# Patient Record
Sex: FEMALE | Race: WHITE | NOT HISPANIC OR LATINO | Employment: PART TIME | URBAN - METROPOLITAN AREA
[De-identification: names, ages, dates, MRNs, and addresses within clinical notes are randomized per-mention and may not be internally consistent; named-entity substitution may affect disease eponyms.]

---

## 2023-04-02 DIAGNOSIS — E78.5 HYPERLIPIDEMIA, UNSPECIFIED: ICD-10-CM

## 2023-04-02 RX ORDER — OMEGA-3-ACID ETHYL ESTERS 1 G/1
CAPSULE, LIQUID FILLED ORAL
Qty: 60 CAPSULE | Refills: 5 | Status: SHIPPED | OUTPATIENT
Start: 2023-04-02 | End: 2023-12-06

## 2023-06-26 DIAGNOSIS — I10 PRIMARY HYPERTENSION: Primary | ICD-10-CM

## 2023-06-27 RX ORDER — VALSARTAN 80 MG/1
TABLET ORAL
Qty: 90 TABLET | Refills: 3 | Status: SHIPPED | OUTPATIENT
Start: 2023-06-27 | End: 2023-10-03 | Stop reason: HOSPADM

## 2023-09-04 DIAGNOSIS — F41.9 ANXIETY: ICD-10-CM

## 2023-09-04 DIAGNOSIS — I10 PRIMARY HYPERTENSION: Primary | ICD-10-CM

## 2023-09-05 RX ORDER — BISOPROLOL FUMARATE AND HYDROCHLOROTHIAZIDE 10; 6.25 MG/1; MG/1
1 TABLET ORAL DAILY
Qty: 90 TABLET | Refills: 0 | Status: SHIPPED | OUTPATIENT
Start: 2023-09-05 | End: 2023-10-03 | Stop reason: HOSPADM

## 2023-09-05 RX ORDER — FLUOXETINE HYDROCHLORIDE 20 MG/1
20 CAPSULE ORAL DAILY
Qty: 90 CAPSULE | Refills: 0 | Status: SHIPPED | OUTPATIENT
Start: 2023-09-05 | End: 2023-11-08

## 2023-09-20 DIAGNOSIS — E78.2 MIXED HYPERLIPIDEMIA: Primary | ICD-10-CM

## 2023-09-20 RX ORDER — FENOFIBRATE 160 MG/1
160 TABLET ORAL EVERY OTHER DAY
Qty: 45 TABLET | Refills: 0 | Status: SHIPPED | OUTPATIENT
Start: 2023-09-20 | End: 2023-10-03 | Stop reason: HOSPADM

## 2023-09-30 PROCEDURE — 99292 CRITICAL CARE ADDL 30 MIN: CPT | Mod: CS | Performed by: EMERGENCY MEDICINE

## 2023-09-30 PROCEDURE — 99291 CRITICAL CARE FIRST HOUR: CPT | Mod: CS | Performed by: EMERGENCY MEDICINE

## 2023-09-30 ASSESSMENT — COLUMBIA-SUICIDE SEVERITY RATING SCALE - C-SSRS
6. HAVE YOU EVER DONE ANYTHING, STARTED TO DO ANYTHING, OR PREPARED TO DO ANYTHING TO END YOUR LIFE?: NO
1. IN THE PAST MONTH, HAVE YOU WISHED YOU WERE DEAD OR WISHED YOU COULD GO TO SLEEP AND NOT WAKE UP?: NO
2. HAVE YOU ACTUALLY HAD ANY THOUGHTS OF KILLING YOURSELF?: NO

## 2023-10-01 ENCOUNTER — PREP FOR PROCEDURE (OUTPATIENT)
Dept: CARDIOLOGY | Facility: HOSPITAL | Age: 63
End: 2023-10-01

## 2023-10-01 ENCOUNTER — HOSPITAL ENCOUNTER (INPATIENT)
Facility: HOSPITAL | Age: 63
LOS: 2 days | Discharge: HOME | DRG: 321 | End: 2023-10-03
Attending: EMERGENCY MEDICINE | Admitting: INTERNAL MEDICINE
Payer: COMMERCIAL

## 2023-10-01 ENCOUNTER — APPOINTMENT (OUTPATIENT)
Dept: RADIOLOGY | Facility: HOSPITAL | Age: 63
DRG: 321 | End: 2023-10-01
Payer: COMMERCIAL

## 2023-10-01 ENCOUNTER — APPOINTMENT (OUTPATIENT)
Dept: CARDIOLOGY | Facility: HOSPITAL | Age: 63
DRG: 321 | End: 2023-10-01
Payer: COMMERCIAL

## 2023-10-01 DIAGNOSIS — I21.3 STEMI (ST ELEVATION MYOCARDIAL INFARCTION) (MULTI): ICD-10-CM

## 2023-10-01 DIAGNOSIS — I21.3 ST ELEVATION MYOCARDIAL INFARCTION (STEMI), UNSPECIFIED ARTERY (MULTI): Primary | ICD-10-CM

## 2023-10-01 DIAGNOSIS — E78.2 MODERATE MIXED HYPERLIPIDEMIA NOT REQUIRING STATIN THERAPY: ICD-10-CM

## 2023-10-01 DIAGNOSIS — I10 PRIMARY HYPERTENSION: ICD-10-CM

## 2023-10-01 DIAGNOSIS — I21.21 ST ELEVATION (STEMI) MYOCARDIAL INFARCTION INVOLVING LEFT CIRCUMFLEX CORONARY ARTERY (MULTI): ICD-10-CM

## 2023-10-01 PROBLEM — E04.1 THYROID NODULE: Status: ACTIVE | Noted: 2021-08-30

## 2023-10-01 PROBLEM — E78.5 HYPERLIPIDEMIA: Status: ACTIVE | Noted: 2018-08-07

## 2023-10-01 PROBLEM — K21.9 GASTROESOPHAGEAL REFLUX DISEASE: Status: ACTIVE | Noted: 2021-08-30

## 2023-10-01 PROBLEM — F32.A DEPRESSIVE DISORDER: Status: ACTIVE | Noted: 2021-08-30

## 2023-10-01 PROBLEM — K76.0 NAFLD (NONALCOHOLIC FATTY LIVER DISEASE): Status: ACTIVE | Noted: 2018-09-12

## 2023-10-01 PROBLEM — F17.200 NICOTINE DEPENDENCE: Status: ACTIVE | Noted: 2021-08-30

## 2023-10-01 LAB
ALBUMIN SERPL BCP-MCNC: 3.4 G/DL (ref 3.4–5)
ALBUMIN SERPL BCP-MCNC: 4.3 G/DL (ref 3.4–5)
ALP SERPL-CCNC: 50 U/L (ref 33–136)
ALT SERPL W P-5'-P-CCNC: 22 U/L (ref 7–45)
ANION GAP SERPL CALC-SCNC: 10 MMOL/L (ref 10–20)
ANION GAP SERPL CALC-SCNC: 15 MMOL/L (ref 10–20)
APTT PPP: 51 SECONDS (ref 27–38)
AST SERPL W P-5'-P-CCNC: 28 U/L (ref 9–39)
ATRIAL RATE: 68 BPM
BASOPHILS # BLD AUTO: 0.08 X10*3/UL (ref 0–0.1)
BASOPHILS NFR BLD AUTO: 0.8 %
BILIRUB SERPL-MCNC: 0.3 MG/DL (ref 0–1.2)
BUN SERPL-MCNC: 17 MG/DL (ref 6–23)
BUN SERPL-MCNC: 20 MG/DL (ref 6–23)
CALCIUM SERPL-MCNC: 8.6 MG/DL (ref 8.6–10.3)
CALCIUM SERPL-MCNC: 9.8 MG/DL (ref 8.6–10.3)
CARDIAC TROPONIN I PNL SERPL HS: 1105 NG/L (ref 0–13)
CARDIAC TROPONIN I PNL SERPL HS: 1609 NG/L (ref 0–13)
CARDIAC TROPONIN I PNL SERPL HS: 210 NG/L (ref 0–13)
CARDIAC TROPONIN I PNL SERPL HS: 454 NG/L (ref 0–13)
CHLORIDE SERPL-SCNC: 102 MMOL/L (ref 98–107)
CHLORIDE SERPL-SCNC: 106 MMOL/L (ref 98–107)
CHOLEST SERPL-MCNC: 158 MG/DL (ref 0–199)
CHOLESTEROL/HDL RATIO: 5.5
CO2 SERPL-SCNC: 25 MMOL/L (ref 21–32)
CO2 SERPL-SCNC: 27 MMOL/L (ref 21–32)
CREAT SERPL-MCNC: 0.69 MG/DL (ref 0.5–1.05)
CREAT SERPL-MCNC: 0.87 MG/DL (ref 0.5–1.05)
D DIMER PPP FEU-MCNC: 690 NG/ML FEU
EJECTION FRACTION APICAL 4 CHAMBER: 72.4
EOSINOPHIL # BLD AUTO: 0.38 X10*3/UL (ref 0–0.7)
EOSINOPHIL NFR BLD AUTO: 3.9 %
ERYTHROCYTE [DISTWIDTH] IN BLOOD BY AUTOMATED COUNT: 16.1 % (ref 11.5–14.5)
ERYTHROCYTE [DISTWIDTH] IN BLOOD BY AUTOMATED COUNT: 16.2 % (ref 11.5–14.5)
EST. AVERAGE GLUCOSE BLD GHB EST-MCNC: 131 MG/DL
GFR SERPL CREATININE-BSD FRML MDRD: 74 ML/MIN/1.73M*2
GFR SERPL CREATININE-BSD FRML MDRD: >90 ML/MIN/1.73M*2
GLUCOSE SERPL-MCNC: 123 MG/DL (ref 74–99)
GLUCOSE SERPL-MCNC: 92 MG/DL (ref 74–99)
HBA1C MFR BLD: 6.2 %
HCT VFR BLD AUTO: 30.4 % (ref 36–46)
HCT VFR BLD AUTO: 38 % (ref 36–46)
HDLC SERPL-MCNC: 28.7 MG/DL
HGB BLD-MCNC: 11.4 G/DL (ref 12–16)
HGB BLD-MCNC: 8.9 G/DL (ref 12–16)
IMM GRANULOCYTES # BLD AUTO: 0.07 X10*3/UL (ref 0–0.7)
IMM GRANULOCYTES NFR BLD AUTO: 0.7 % (ref 0–0.9)
INR PPP: 1.1 (ref 0.9–1.1)
LDLC SERPL CALC-MCNC: 92 MG/DL (ref 140–190)
LYMPHOCYTES # BLD AUTO: 2.03 X10*3/UL (ref 1.2–4.8)
LYMPHOCYTES NFR BLD AUTO: 20.6 %
MAGNESIUM SERPL-MCNC: 1.7 MG/DL (ref 1.6–2.4)
MCH RBC QN AUTO: 22.5 PG (ref 26–34)
MCH RBC QN AUTO: 22.9 PG (ref 26–34)
MCHC RBC AUTO-ENTMCNC: 29.3 G/DL (ref 32–36)
MCHC RBC AUTO-ENTMCNC: 30 G/DL (ref 32–36)
MCV RBC AUTO: 76 FL (ref 80–100)
MCV RBC AUTO: 77 FL (ref 80–100)
MONOCYTES # BLD AUTO: 0.68 X10*3/UL (ref 0.1–1)
MONOCYTES NFR BLD AUTO: 6.9 %
NEUTROPHILS # BLD AUTO: 6.63 X10*3/UL (ref 1.2–7.7)
NEUTROPHILS NFR BLD AUTO: 67.1 %
NON HDL CHOLESTEROL: 129 MG/DL (ref 0–149)
NRBC BLD-RTO: 0 /100 WBCS (ref 0–0)
NRBC BLD-RTO: 0 /100 WBCS (ref 0–0)
P AXIS: 51 DEGREES
P OFFSET: 195 MS
P ONSET: 140 MS
PHOSPHATE SERPL-MCNC: 4.4 MG/DL (ref 2.5–4.9)
PLATELET # BLD AUTO: 226 X10*3/UL (ref 150–450)
PLATELET # BLD AUTO: 331 X10*3/UL (ref 150–450)
PMV BLD AUTO: 9.8 FL (ref 7.5–11.5)
PMV BLD AUTO: 9.9 FL (ref 7.5–11.5)
POTASSIUM SERPL-SCNC: 3.3 MMOL/L (ref 3.5–5.3)
POTASSIUM SERPL-SCNC: 3.5 MMOL/L (ref 3.5–5.3)
PR INTERVAL: 144 MS
PROT SERPL-MCNC: 8.3 G/DL (ref 6.4–8.2)
PROTHROMBIN TIME: 12.5 SECONDS (ref 9.8–12.8)
Q ONSET: 212 MS
QRS COUNT: 11 BEATS
QRS DURATION: 88 MS
QT INTERVAL: 440 MS
QTC CALCULATION(BAZETT): 467 MS
QTC FREDERICIA: 459 MS
R AXIS: -41 DEGREES
RBC # BLD AUTO: 3.95 X10*6/UL (ref 4–5.2)
RBC # BLD AUTO: 4.98 X10*6/UL (ref 4–5.2)
SARS-COV-2 RNA RESP QL NAA+PROBE: NOT DETECTED
SODIUM SERPL-SCNC: 139 MMOL/L (ref 136–145)
SODIUM SERPL-SCNC: 139 MMOL/L (ref 136–145)
T AXIS: 72 DEGREES
T OFFSET: 432 MS
TRIGL SERPL-MCNC: 189 MG/DL (ref 0–149)
VENTRICULAR RATE: 68 BPM
VLDL: 38 MG/DL (ref 0–40)
WBC # BLD AUTO: 8.8 X10*3/UL (ref 4.4–11.3)
WBC # BLD AUTO: 9.9 X10*3/UL (ref 4.4–11.3)

## 2023-10-01 PROCEDURE — 93005 ELECTROCARDIOGRAM TRACING: CPT

## 2023-10-01 PROCEDURE — 2500000002 HC RX 250 W HCPCS SELF ADMINISTERED DRUGS (ALT 637 FOR MEDICARE OP, ALT 636 FOR OP/ED): Performed by: NURSE PRACTITIONER

## 2023-10-01 PROCEDURE — 2780000003 HC OR 278 NO HCPCS: Performed by: STUDENT IN AN ORGANIZED HEALTH CARE EDUCATION/TRAINING PROGRAM

## 2023-10-01 PROCEDURE — 80053 COMPREHEN METABOLIC PANEL: CPT | Performed by: EMERGENCY MEDICINE

## 2023-10-01 PROCEDURE — 80069 RENAL FUNCTION PANEL: CPT | Performed by: STUDENT IN AN ORGANIZED HEALTH CARE EDUCATION/TRAINING PROGRAM

## 2023-10-01 PROCEDURE — 2500000001 HC RX 250 WO HCPCS SELF ADMINISTERED DRUGS (ALT 637 FOR MEDICARE OP)

## 2023-10-01 PROCEDURE — 83735 ASSAY OF MAGNESIUM: CPT | Performed by: EMERGENCY MEDICINE

## 2023-10-01 PROCEDURE — 99223 1ST HOSP IP/OBS HIGH 75: CPT | Performed by: STUDENT IN AN ORGANIZED HEALTH CARE EDUCATION/TRAINING PROGRAM

## 2023-10-01 PROCEDURE — 2500000004 HC RX 250 GENERAL PHARMACY W/ HCPCS (ALT 636 FOR OP/ED)

## 2023-10-01 PROCEDURE — C9606 PERC D-E COR REVASC W AMI S: HCPCS | Mod: TC | Performed by: STUDENT IN AN ORGANIZED HEALTH CARE EDUCATION/TRAINING PROGRAM

## 2023-10-01 PROCEDURE — 2500000001 HC RX 250 WO HCPCS SELF ADMINISTERED DRUGS (ALT 637 FOR MEDICARE OP): Performed by: INTERNAL MEDICINE

## 2023-10-01 PROCEDURE — B2111ZZ FLUOROSCOPY OF MULTIPLE CORONARY ARTERIES USING LOW OSMOLAR CONTRAST: ICD-10-PCS | Performed by: STUDENT IN AN ORGANIZED HEALTH CARE EDUCATION/TRAINING PROGRAM

## 2023-10-01 PROCEDURE — 2500000005 HC RX 250 GENERAL PHARMACY W/O HCPCS

## 2023-10-01 PROCEDURE — C1725 CATH, TRANSLUMIN NON-LASER: HCPCS | Performed by: STUDENT IN AN ORGANIZED HEALTH CARE EDUCATION/TRAINING PROGRAM

## 2023-10-01 PROCEDURE — C1753 CATH, INTRAVAS ULTRASOUND: HCPCS | Performed by: STUDENT IN AN ORGANIZED HEALTH CARE EDUCATION/TRAINING PROGRAM

## 2023-10-01 PROCEDURE — 85025 COMPLETE CBC W/AUTO DIFF WBC: CPT | Performed by: EMERGENCY MEDICINE

## 2023-10-01 PROCEDURE — 2500000001 HC RX 250 WO HCPCS SELF ADMINISTERED DRUGS (ALT 637 FOR MEDICARE OP): Performed by: EMERGENCY MEDICINE

## 2023-10-01 PROCEDURE — C1887 CATHETER, GUIDING: HCPCS | Performed by: STUDENT IN AN ORGANIZED HEALTH CARE EDUCATION/TRAINING PROGRAM

## 2023-10-01 PROCEDURE — 2500000005 HC RX 250 GENERAL PHARMACY W/O HCPCS: Performed by: EMERGENCY MEDICINE

## 2023-10-01 PROCEDURE — 4A023N7 MEASUREMENT OF CARDIAC SAMPLING AND PRESSURE, LEFT HEART, PERCUTANEOUS APPROACH: ICD-10-PCS | Performed by: STUDENT IN AN ORGANIZED HEALTH CARE EDUCATION/TRAINING PROGRAM

## 2023-10-01 PROCEDURE — 92978 ENDOLUMINL IVUS OCT C 1ST: CPT | Mod: 59

## 2023-10-01 PROCEDURE — 99233 SBSQ HOSP IP/OBS HIGH 50: CPT | Performed by: INTERNAL MEDICINE

## 2023-10-01 PROCEDURE — 2020000001 HC ICU ROOM DAILY

## 2023-10-01 PROCEDURE — 99152 MOD SED SAME PHYS/QHP 5/>YRS: CPT | Performed by: STUDENT IN AN ORGANIZED HEALTH CARE EDUCATION/TRAINING PROGRAM

## 2023-10-01 PROCEDURE — 84484 ASSAY OF TROPONIN QUANT: CPT | Performed by: EMERGENCY MEDICINE

## 2023-10-01 PROCEDURE — 87635 SARS-COV-2 COVID-19 AMP PRB: CPT

## 2023-10-01 PROCEDURE — 99153 MOD SED SAME PHYS/QHP EA: CPT | Performed by: STUDENT IN AN ORGANIZED HEALTH CARE EDUCATION/TRAINING PROGRAM

## 2023-10-01 PROCEDURE — 85379 FIBRIN DEGRADATION QUANT: CPT | Performed by: EMERGENCY MEDICINE

## 2023-10-01 PROCEDURE — 2720000007 HC OR 272 NO HCPCS: Performed by: STUDENT IN AN ORGANIZED HEALTH CARE EDUCATION/TRAINING PROGRAM

## 2023-10-01 PROCEDURE — 85027 COMPLETE CBC AUTOMATED: CPT | Performed by: STUDENT IN AN ORGANIZED HEALTH CARE EDUCATION/TRAINING PROGRAM

## 2023-10-01 PROCEDURE — 2500000001 HC RX 250 WO HCPCS SELF ADMINISTERED DRUGS (ALT 637 FOR MEDICARE OP): Performed by: STUDENT IN AN ORGANIZED HEALTH CARE EDUCATION/TRAINING PROGRAM

## 2023-10-01 PROCEDURE — 027135Z DILATION OF CORONARY ARTERY, TWO ARTERIES WITH TWO DRUG-ELUTING INTRALUMINAL DEVICES, PERCUTANEOUS APPROACH: ICD-10-PCS | Performed by: STUDENT IN AN ORGANIZED HEALTH CARE EDUCATION/TRAINING PROGRAM

## 2023-10-01 PROCEDURE — 92929: CPT

## 2023-10-01 PROCEDURE — C1874 STENT, COATED/COV W/DEL SYS: HCPCS | Performed by: STUDENT IN AN ORGANIZED HEALTH CARE EDUCATION/TRAINING PROGRAM

## 2023-10-01 PROCEDURE — 2500000004 HC RX 250 GENERAL PHARMACY W/ HCPCS (ALT 636 FOR OP/ED): Performed by: STUDENT IN AN ORGANIZED HEALTH CARE EDUCATION/TRAINING PROGRAM

## 2023-10-01 PROCEDURE — 93454 CORONARY ARTERY ANGIO S&I: CPT

## 2023-10-01 PROCEDURE — 36415 COLL VENOUS BLD VENIPUNCTURE: CPT | Performed by: EMERGENCY MEDICINE

## 2023-10-01 PROCEDURE — 92978 ENDOLUMINL IVUS OCT C 1ST: CPT | Performed by: STUDENT IN AN ORGANIZED HEALTH CARE EDUCATION/TRAINING PROGRAM

## 2023-10-01 PROCEDURE — S4991 NICOTINE PATCH NONLEGEND: HCPCS | Performed by: NURSE PRACTITIONER

## 2023-10-01 PROCEDURE — 93454 CORONARY ARTERY ANGIO S&I: CPT | Performed by: STUDENT IN AN ORGANIZED HEALTH CARE EDUCATION/TRAINING PROGRAM

## 2023-10-01 PROCEDURE — 2550000001 HC RX 255 CONTRASTS: Performed by: STUDENT IN AN ORGANIZED HEALTH CARE EDUCATION/TRAINING PROGRAM

## 2023-10-01 PROCEDURE — 93010 ELECTROCARDIOGRAM REPORT: CPT | Performed by: INTERNAL MEDICINE

## 2023-10-01 PROCEDURE — 83036 HEMOGLOBIN GLYCOSYLATED A1C: CPT | Mod: CMCLAB,ELYLAB | Performed by: STUDENT IN AN ORGANIZED HEALTH CARE EDUCATION/TRAINING PROGRAM

## 2023-10-01 PROCEDURE — 85730 THROMBOPLASTIN TIME PARTIAL: CPT | Performed by: STUDENT IN AN ORGANIZED HEALTH CARE EDUCATION/TRAINING PROGRAM

## 2023-10-01 PROCEDURE — 2500000004 HC RX 250 GENERAL PHARMACY W/ HCPCS (ALT 636 FOR OP/ED): Performed by: NURSE PRACTITIONER

## 2023-10-01 PROCEDURE — 2500000001 HC RX 250 WO HCPCS SELF ADMINISTERED DRUGS (ALT 637 FOR MEDICARE OP): Performed by: NURSE PRACTITIONER

## 2023-10-01 PROCEDURE — 36415 COLL VENOUS BLD VENIPUNCTURE: CPT | Performed by: STUDENT IN AN ORGANIZED HEALTH CARE EDUCATION/TRAINING PROGRAM

## 2023-10-01 PROCEDURE — 85347 COAGULATION TIME ACTIVATED: CPT

## 2023-10-01 PROCEDURE — 71045 X-RAY EXAM CHEST 1 VIEW: CPT | Mod: FY

## 2023-10-01 PROCEDURE — 85610 PROTHROMBIN TIME: CPT | Performed by: STUDENT IN AN ORGANIZED HEALTH CARE EDUCATION/TRAINING PROGRAM

## 2023-10-01 PROCEDURE — 84484 ASSAY OF TROPONIN QUANT: CPT | Performed by: STUDENT IN AN ORGANIZED HEALTH CARE EDUCATION/TRAINING PROGRAM

## 2023-10-01 PROCEDURE — 92928 PRQ TCAT PLMT NTRAC ST 1 LES: CPT | Performed by: STUDENT IN AN ORGANIZED HEALTH CARE EDUCATION/TRAINING PROGRAM

## 2023-10-01 PROCEDURE — 93306 TTE W/DOPPLER COMPLETE: CPT

## 2023-10-01 PROCEDURE — 71045 X-RAY EXAM CHEST 1 VIEW: CPT | Performed by: STUDENT IN AN ORGANIZED HEALTH CARE EDUCATION/TRAINING PROGRAM

## 2023-10-01 PROCEDURE — 2500000004 HC RX 250 GENERAL PHARMACY W/ HCPCS (ALT 636 FOR OP/ED): Performed by: EMERGENCY MEDICINE

## 2023-10-01 PROCEDURE — 80061 LIPID PANEL: CPT | Performed by: STUDENT IN AN ORGANIZED HEALTH CARE EDUCATION/TRAINING PROGRAM

## 2023-10-01 PROCEDURE — 99291 CRITICAL CARE FIRST HOUR: CPT

## 2023-10-01 PROCEDURE — 93306 TTE W/DOPPLER COMPLETE: CPT | Performed by: INTERNAL MEDICINE

## 2023-10-01 PROCEDURE — S0119 ONDANSETRON 4 MG: HCPCS | Performed by: EMERGENCY MEDICINE

## 2023-10-01 PROCEDURE — C1769 GUIDE WIRE: HCPCS | Performed by: STUDENT IN AN ORGANIZED HEALTH CARE EDUCATION/TRAINING PROGRAM

## 2023-10-01 DEVICE — IMPLANTABLE DEVICE: Type: IMPLANTABLE DEVICE | Status: FUNCTIONAL

## 2023-10-01 DEVICE — STENT, ONYX FRONTIER DES, 2.75 X 22RX: Type: IMPLANTABLE DEVICE | Status: FUNCTIONAL

## 2023-10-01 RX ORDER — ONDANSETRON HYDROCHLORIDE 2 MG/ML
INJECTION, SOLUTION INTRAVENOUS CODE/TRAUMA/SEDATION MEDICATION
Status: COMPLETED | OUTPATIENT
Start: 2023-10-01 | End: 2023-10-01

## 2023-10-01 RX ORDER — HEPARIN SODIUM 5000 [USP'U]/ML
4000 INJECTION, SOLUTION INTRAVENOUS; SUBCUTANEOUS ONCE
Status: COMPLETED | OUTPATIENT
Start: 2023-10-01 | End: 2023-10-01

## 2023-10-01 RX ORDER — HEPARIN SODIUM 1000 [USP'U]/ML
INJECTION, SOLUTION INTRAVENOUS; SUBCUTANEOUS AS NEEDED
Status: DISCONTINUED | OUTPATIENT
Start: 2023-10-01 | End: 2023-10-01 | Stop reason: HOSPADM

## 2023-10-01 RX ORDER — CARVEDILOL 12.5 MG/1
12.5 TABLET ORAL 2 TIMES DAILY
Status: DISCONTINUED | OUTPATIENT
Start: 2023-10-01 | End: 2023-10-02

## 2023-10-01 RX ORDER — LABETALOL HYDROCHLORIDE 5 MG/ML
INJECTION, SOLUTION INTRAVENOUS
Status: COMPLETED
Start: 2023-10-01 | End: 2023-10-01

## 2023-10-01 RX ORDER — ASPIRIN 81 MG/1
81 TABLET ORAL DAILY
Status: DISCONTINUED | OUTPATIENT
Start: 2023-10-01 | End: 2023-10-01

## 2023-10-01 RX ORDER — FLUOXETINE HYDROCHLORIDE 20 MG/1
20 CAPSULE ORAL DAILY
Status: DISCONTINUED | OUTPATIENT
Start: 2023-10-01 | End: 2023-10-03 | Stop reason: HOSPADM

## 2023-10-01 RX ORDER — SODIUM CHLORIDE 9 MG/ML
1.5 INJECTION, SOLUTION INTRAVENOUS CONTINUOUS
Status: DISCONTINUED | OUTPATIENT
Start: 2023-10-01 | End: 2023-10-01

## 2023-10-01 RX ORDER — NAPROXEN SODIUM 220 MG/1
TABLET, FILM COATED ORAL CODE/TRAUMA/SEDATION MEDICATION
Status: COMPLETED | OUTPATIENT
Start: 2023-10-01 | End: 2023-10-01

## 2023-10-01 RX ORDER — LABETALOL HYDROCHLORIDE 5 MG/ML
10 INJECTION, SOLUTION INTRAVENOUS ONCE
Status: COMPLETED | OUTPATIENT
Start: 2023-10-01 | End: 2023-10-01

## 2023-10-01 RX ORDER — ACETAMINOPHEN 500 MG
5 TABLET ORAL NIGHTLY
Status: DISCONTINUED | OUTPATIENT
Start: 2023-10-01 | End: 2023-10-03 | Stop reason: HOSPADM

## 2023-10-01 RX ORDER — FENTANYL CITRATE 50 UG/ML
INJECTION, SOLUTION INTRAMUSCULAR; INTRAVENOUS AS NEEDED
Status: DISCONTINUED | OUTPATIENT
Start: 2023-10-01 | End: 2023-10-01 | Stop reason: HOSPADM

## 2023-10-01 RX ORDER — HYDRALAZINE HYDROCHLORIDE 20 MG/ML
10 INJECTION INTRAMUSCULAR; INTRAVENOUS EVERY 4 HOURS PRN
Status: DISCONTINUED | OUTPATIENT
Start: 2023-10-01 | End: 2023-10-03 | Stop reason: HOSPADM

## 2023-10-01 RX ORDER — PANTOPRAZOLE SODIUM 20 MG/1
20 TABLET, DELAYED RELEASE ORAL
Status: DISCONTINUED | OUTPATIENT
Start: 2023-10-01 | End: 2023-10-03 | Stop reason: HOSPADM

## 2023-10-01 RX ORDER — ONDANSETRON HYDROCHLORIDE 2 MG/ML
INJECTION, SOLUTION INTRAVENOUS
Status: COMPLETED
Start: 2023-10-01 | End: 2023-10-01

## 2023-10-01 RX ORDER — SODIUM CHLORIDE 9 MG/ML
100 INJECTION, SOLUTION INTRAVENOUS CONTINUOUS
Status: CANCELLED | OUTPATIENT
Start: 2023-10-02

## 2023-10-01 RX ORDER — POTASSIUM CHLORIDE 20 MEQ/1
TABLET, EXTENDED RELEASE ORAL
Status: COMPLETED
Start: 2023-10-01 | End: 2023-10-01

## 2023-10-01 RX ORDER — ONDANSETRON 4 MG/1
4 TABLET, ORALLY DISINTEGRATING ORAL ONCE
Status: COMPLETED | OUTPATIENT
Start: 2023-10-01 | End: 2023-10-01

## 2023-10-01 RX ORDER — HYDROXYZINE HYDROCHLORIDE 25 MG/1
25 TABLET, FILM COATED ORAL EVERY 6 HOURS PRN
Status: DISCONTINUED | OUTPATIENT
Start: 2023-10-01 | End: 2023-10-03 | Stop reason: HOSPADM

## 2023-10-01 RX ORDER — HYDRALAZINE HYDROCHLORIDE 20 MG/ML
10 INJECTION INTRAMUSCULAR; INTRAVENOUS ONCE
Status: COMPLETED | OUTPATIENT
Start: 2023-10-01 | End: 2023-10-01

## 2023-10-01 RX ORDER — VIT C/E/ZN/COPPR/LUTEIN/ZEAXAN 250MG-90MG
25 CAPSULE ORAL DAILY
COMMUNITY

## 2023-10-01 RX ORDER — LABETALOL HYDROCHLORIDE 5 MG/ML
INJECTION, SOLUTION INTRAVENOUS AS NEEDED
Status: DISCONTINUED | OUTPATIENT
Start: 2023-10-01 | End: 2023-10-01 | Stop reason: HOSPADM

## 2023-10-01 RX ORDER — NITROGLYCERIN 0.4 MG/1
TABLET SUBLINGUAL CODE/TRAUMA/SEDATION MEDICATION
Status: COMPLETED | OUTPATIENT
Start: 2023-10-01 | End: 2023-10-01

## 2023-10-01 RX ORDER — CARVEDILOL 6.25 MG/1
6.25 TABLET ORAL 2 TIMES DAILY
Status: DISCONTINUED | OUTPATIENT
Start: 2023-10-01 | End: 2023-10-01

## 2023-10-01 RX ORDER — ICOSAPENT ETHYL 1 G/1
2 CAPSULE ORAL
Status: DISCONTINUED | OUTPATIENT
Start: 2023-10-01 | End: 2023-10-03 | Stop reason: HOSPADM

## 2023-10-01 RX ORDER — LOSARTAN POTASSIUM 50 MG/1
50 TABLET ORAL DAILY
Status: DISCONTINUED | OUTPATIENT
Start: 2023-10-01 | End: 2023-10-02

## 2023-10-01 RX ORDER — OMEPRAZOLE 20 MG/1
1 TABLET, DELAYED RELEASE ORAL
COMMUNITY

## 2023-10-01 RX ORDER — SODIUM CHLORIDE 9 MG/ML
125 INJECTION, SOLUTION INTRAVENOUS CONTINUOUS
Status: DISCONTINUED | OUTPATIENT
Start: 2023-10-01 | End: 2023-10-02

## 2023-10-01 RX ORDER — ONDANSETRON HYDROCHLORIDE 2 MG/ML
4 INJECTION, SOLUTION INTRAVENOUS ONCE
Status: COMPLETED | OUTPATIENT
Start: 2023-10-01 | End: 2023-10-01

## 2023-10-01 RX ORDER — MIDAZOLAM HYDROCHLORIDE 1 MG/ML
INJECTION INTRAMUSCULAR; INTRAVENOUS AS NEEDED
Status: DISCONTINUED | OUTPATIENT
Start: 2023-10-01 | End: 2023-10-01 | Stop reason: HOSPADM

## 2023-10-01 RX ORDER — METOPROLOL TARTRATE 1 MG/ML
5 INJECTION, SOLUTION INTRAVENOUS ONCE
Status: COMPLETED | OUTPATIENT
Start: 2023-10-01 | End: 2023-10-01

## 2023-10-01 RX ORDER — IBUPROFEN 200 MG
1 TABLET ORAL DAILY
Status: DISCONTINUED | OUTPATIENT
Start: 2023-10-01 | End: 2023-10-03 | Stop reason: HOSPADM

## 2023-10-01 RX ORDER — LIDOCAINE HYDROCHLORIDE 20 MG/ML
INJECTION, SOLUTION INFILTRATION; PERINEURAL AS NEEDED
Status: DISCONTINUED | OUTPATIENT
Start: 2023-10-01 | End: 2023-10-01 | Stop reason: HOSPADM

## 2023-10-01 RX ORDER — NITROGLYCERIN 0.4 MG/1
0.4 TABLET SUBLINGUAL EVERY 5 MIN PRN
Status: DISCONTINUED | OUTPATIENT
Start: 2023-10-01 | End: 2023-10-03 | Stop reason: HOSPADM

## 2023-10-01 RX ORDER — ATORVASTATIN CALCIUM 80 MG/1
80 TABLET, FILM COATED ORAL ONCE
Status: DISCONTINUED | OUTPATIENT
Start: 2023-10-01 | End: 2023-10-03 | Stop reason: HOSPADM

## 2023-10-01 RX ORDER — ASPIRIN 325 MG
325 TABLET ORAL ONCE
Status: CANCELLED | OUTPATIENT
Start: 2023-10-02

## 2023-10-01 RX ORDER — METOPROLOL TARTRATE 1 MG/ML
INJECTION, SOLUTION INTRAVENOUS
Status: COMPLETED
Start: 2023-10-01 | End: 2023-10-01

## 2023-10-01 RX ORDER — CHOLECALCIFEROL (VITAMIN D3) 25 MCG
25 TABLET ORAL DAILY
Status: DISCONTINUED | OUTPATIENT
Start: 2023-10-01 | End: 2023-10-03 | Stop reason: HOSPADM

## 2023-10-01 RX ADMIN — TICAGRELOR 180 MG: 90 TABLET ORAL at 00:12

## 2023-10-01 RX ADMIN — PANTOPRAZOLE SODIUM 20 MG: 20 TABLET, DELAYED RELEASE ORAL at 06:38

## 2023-10-01 RX ADMIN — HYDROXYZINE HYDROCHLORIDE 25 MG: 25 TABLET ORAL at 19:36

## 2023-10-01 RX ADMIN — METOPROLOL TARTRATE 5 MG: 5 INJECTION INTRAVENOUS at 04:14

## 2023-10-01 RX ADMIN — LOSARTAN POTASSIUM 50 MG: 50 TABLET, FILM COATED ORAL at 11:16

## 2023-10-01 RX ADMIN — Medication 25 MCG: at 08:45

## 2023-10-01 RX ADMIN — HYDRALAZINE HYDROCHLORIDE 10 MG: 20 INJECTION INTRAMUSCULAR; INTRAVENOUS at 05:34

## 2023-10-01 RX ADMIN — LABETALOL HYDROCHLORIDE 10 MG: 5 INJECTION, SOLUTION INTRAVENOUS at 06:38

## 2023-10-01 RX ADMIN — NITROGLYCERIN 0.4 MG: 0.4 TABLET SUBLINGUAL at 00:12

## 2023-10-01 RX ADMIN — Medication: at 04:30

## 2023-10-01 RX ADMIN — ASPIRIN 81 MG: 81 TABLET, COATED ORAL at 08:44

## 2023-10-01 RX ADMIN — TICAGRELOR 180 MG: 90 TABLET ORAL at 00:10

## 2023-10-01 RX ADMIN — SODIUM CHLORIDE 125 ML/HR: 9 INJECTION, SOLUTION INTRAVENOUS at 06:33

## 2023-10-01 RX ADMIN — POTASSIUM CHLORIDE 40 MEQ: 1500 TABLET, EXTENDED RELEASE ORAL at 04:57

## 2023-10-01 RX ADMIN — ICOSAPENT ETHYL 2 G: 1 CAPSULE ORAL at 16:45

## 2023-10-01 RX ADMIN — Medication 5 MG: at 20:54

## 2023-10-01 RX ADMIN — ICOSAPENT ETHYL 2 G: 1 CAPSULE ORAL at 08:44

## 2023-10-01 RX ADMIN — SODIUM CHLORIDE 125 ML/HR: 9 INJECTION, SOLUTION INTRAVENOUS at 15:14

## 2023-10-01 RX ADMIN — ONDANSETRON 4 MG: 4 TABLET, ORALLY DISINTEGRATING ORAL at 00:10

## 2023-10-01 RX ADMIN — CARVEDILOL 12.5 MG: 12.5 TABLET, FILM COATED ORAL at 20:54

## 2023-10-01 RX ADMIN — FLUOXETINE HYDROCHLORIDE 20 MG: 20 CAPSULE ORAL at 08:44

## 2023-10-01 RX ADMIN — NICOTINE 1 PATCH: 21 PATCH, EXTENDED RELEASE TRANSDERMAL at 16:45

## 2023-10-01 RX ADMIN — HEPARIN SODIUM 4000 UNITS: 5000 INJECTION, SOLUTION INTRAVENOUS; SUBCUTANEOUS at 00:20

## 2023-10-01 RX ADMIN — HYDRALAZINE HYDROCHLORIDE 10 MG: 20 INJECTION INTRAMUSCULAR; INTRAVENOUS at 15:00

## 2023-10-01 RX ADMIN — TICAGRELOR 90 MG: 90 TABLET ORAL at 08:44

## 2023-10-01 RX ADMIN — ONDANSETRON 4 MG: 2 INJECTION INTRAMUSCULAR; INTRAVENOUS at 00:15

## 2023-10-01 RX ADMIN — METOPROLOL TARTRATE 5 MG: 1 INJECTION, SOLUTION INTRAVENOUS at 04:14

## 2023-10-01 RX ADMIN — TICAGRELOR 90 MG: 90 TABLET ORAL at 20:54

## 2023-10-01 RX ADMIN — ONDANSETRON: 2 INJECTION INTRAMUSCULAR; INTRAVENOUS at 00:20

## 2023-10-01 RX ADMIN — CARVEDILOL 6.25 MG: 6.25 TABLET, FILM COATED ORAL at 08:43

## 2023-10-01 RX ADMIN — ASPIRIN 81 MG CHEWABLE TABLET 325 MG: 81 TABLET CHEWABLE at 00:10

## 2023-10-01 RX ADMIN — Medication: at 08:00

## 2023-10-01 SDOH — SOCIAL STABILITY: SOCIAL INSECURITY: ABUSE: ADULT

## 2023-10-01 SDOH — SOCIAL STABILITY: SOCIAL INSECURITY: DO YOU FEEL UNSAFE GOING BACK TO THE PLACE WHERE YOU ARE LIVING?: NO

## 2023-10-01 SDOH — SOCIAL STABILITY: SOCIAL INSECURITY: HAVE YOU HAD THOUGHTS OF HARMING ANYONE ELSE?: NO

## 2023-10-01 SDOH — SOCIAL STABILITY: SOCIAL INSECURITY: DO YOU FEEL ANYONE HAS EXPLOITED OR TAKEN ADVANTAGE OF YOU FINANCIALLY OR OF YOUR PERSONAL PROPERTY?: NO

## 2023-10-01 SDOH — SOCIAL STABILITY: SOCIAL INSECURITY: DOES ANYONE TRY TO KEEP YOU FROM HAVING/CONTACTING OTHER FRIENDS OR DOING THINGS OUTSIDE YOUR HOME?: NO

## 2023-10-01 SDOH — SOCIAL STABILITY: SOCIAL INSECURITY: HAS ANYONE EVER THREATENED TO HURT YOUR FAMILY OR YOUR PETS?: NO

## 2023-10-01 SDOH — SOCIAL STABILITY: SOCIAL INSECURITY: ARE THERE ANY APPARENT SIGNS OF INJURIES/BEHAVIORS THAT COULD BE RELATED TO ABUSE/NEGLECT?: NO

## 2023-10-01 ASSESSMENT — COGNITIVE AND FUNCTIONAL STATUS - GENERAL
MOBILITY SCORE: 24
DAILY ACTIVITIY SCORE: 24
PATIENT BASELINE BEDBOUND: NO

## 2023-10-01 ASSESSMENT — ACTIVITIES OF DAILY LIVING (ADL)
WALKS IN HOME: INDEPENDENT
GROOMING: INDEPENDENT
GROOMING: INDEPENDENT
JUDGMENT_ADEQUATE_SAFELY_COMPLETE_DAILY_ACTIVITIES: YES
TOILETING: INDEPENDENT
PATIENT'S MEMORY ADEQUATE TO SAFELY COMPLETE DAILY ACTIVITIES?: YES
TOILETING: INDEPENDENT
ADEQUATE_TO_COMPLETE_ADL: YES
JUDGMENT_ADEQUATE_SAFELY_COMPLETE_DAILY_ACTIVITIES: YES
HEARING - RIGHT EAR: FUNCTIONAL
DRESSING YOURSELF: INDEPENDENT
BATHING: INDEPENDENT
HEARING - LEFT EAR: FUNCTIONAL
ADEQUATE_TO_COMPLETE_ADL: YES
WALKS IN HOME: INDEPENDENT
HEARING - LEFT EAR: FUNCTIONAL
PATIENT'S MEMORY ADEQUATE TO SAFELY COMPLETE DAILY ACTIVITIES?: YES
FEEDING YOURSELF: INDEPENDENT
BATHING: INDEPENDENT
HEARING - RIGHT EAR: FUNCTIONAL
DRESSING YOURSELF: INDEPENDENT
FEEDING YOURSELF: INDEPENDENT

## 2023-10-01 ASSESSMENT — LIFESTYLE VARIABLES
SKIP TO QUESTIONS 9-10: 1
AUDIT-C TOTAL SCORE: 0
HOW MANY STANDARD DRINKS CONTAINING ALCOHOL DO YOU HAVE ON A TYPICAL DAY: PATIENT DOES NOT DRINK
AUDIT-C TOTAL SCORE: 0
HOW OFTEN DO YOU HAVE A DRINK CONTAINING ALCOHOL: NEVER
HOW OFTEN DO YOU HAVE 6 OR MORE DRINKS ON ONE OCCASION: NEVER

## 2023-10-01 ASSESSMENT — PAIN SCALES - GENERAL
PAINLEVEL_OUTOF10: 7
PAINLEVEL_OUTOF10: 0 - NO PAIN

## 2023-10-01 ASSESSMENT — PAIN DESCRIPTION - LOCATION: LOCATION: CHEST

## 2023-10-01 ASSESSMENT — PATIENT HEALTH QUESTIONNAIRE - PHQ9
2. FEELING DOWN, DEPRESSED OR HOPELESS: NOT AT ALL
SUM OF ALL RESPONSES TO PHQ9 QUESTIONS 1 & 2: 0
1. LITTLE INTEREST OR PLEASURE IN DOING THINGS: NOT AT ALL

## 2023-10-01 ASSESSMENT — PAIN - FUNCTIONAL ASSESSMENT
PAIN_FUNCTIONAL_ASSESSMENT: 0-10

## 2023-10-01 NOTE — H&P
Critical Care Medicine History and Physical        HPI:  Subjective   Patient is a 63 y.o. female admitted on 10/1/2023 12:05 AM  with chief complaint of with chief complaint of Chest pain.  Patient states that she has had a 3-day history of midsternal chest pain with no radiation that she states was constant and was an 8 out of 10 on admission.  She states that walking made it better but lying in bed made it worse.  She states that she would only get this chest pain at night.  She states that it got worse this evening with shortness of breath and decided to come to the emergency department.  On exam she reports chest pain and shortness of breath.  She denies any fevers or chills.  She denies any headache, dizziness, diplopia or diaphoresis.  She denies any cough.  She denies any nausea, vomiting, diarrhea, hematochezia, hematemesis, melena stools or abdominal pain.  She states that her appetite and weight are at her baseline.  She denies any urinary symptoms..    Past Medical History:   Diagnosis Date    Anxiety     GERD (gastroesophageal reflux disease)     Hyperlipemia     Hyperlipidemia     Hypertension        Past Surgical History:   Procedure Laterality Date    CORONARY ANGIOPLASTY WITH STENT PLACEMENT      US ASPIRATION INJECTION INTERMEDIATE JOINT  12/23/2019    US ASPIRATION INJECTION INTERMEDIATE JOINT 12/23/2019 [facility]    US GUIDED THYROID BIOPSY  12/13/2019    US GUIDED THYROID BIOPSY 12/13/2019 [facility]       Family History   Problem Relation Name Age of Onset    Heart attack Father         Social History     Tobacco Use    Smoking status: Every Day     Packs/day: 1.00     Years: 15.00     Additional pack years: 0.00     Total pack years: 15.00     Types: Cigarettes    Smokeless tobacco: Never   Vaping Use    Vaping Use: Never used   Substance Use Topics    Alcohol use: Never    Drug use: Never       Review of Systems:  14 point review of systems was completed and negative  except for those specially mention in my HPI    Patient has no known allergies.    Medications Prior to Admission   Medication Sig Dispense Refill Last Dose    bisoproloL-hydrochlorothiazide (Ziac) 10-6.25 mg tablet TAKE 1 TABLET DAILY 90 tablet 0     cholecalciferol (Vitamin D-3) 25 MCG (1000 UT) capsule Take 1 capsule (25 mcg) by mouth once daily.       fenofibrate (Triglide) 160 mg tablet TAKE 1 TABLET EVERY OTHER DAY 45 tablet 0     FLUoxetine (PROzac) 20 mg capsule TAKE 1 CAPSULE DAILY 90 capsule 0     omega-3 acid ethyl esters (Lovaza) 1 gram capsule TAKE 1 CAPSULE BY MOUTH TWICE A DAY 60 capsule 5     omeprazole OTC (PriLOSEC OTC) 20 mg EC tablet Take 1 tablet (20 mg) by mouth once daily in the morning. Take before meals. Do not crush, chew, or split.       valsartan (Diovan) 80 mg tablet TAKE 1 TABLET DAILY 90 tablet 3        Scheduled Medications:   aspirin, 81 mg, oral, Daily  atorvastatin, 80 mg, oral, Once  carvedilol, 6.25 mg, oral, BID  cholecalciferol, 25 mcg, oral, Daily  FLUoxetine, 20 mg, oral, Daily  icosapent ethyL, 2 g, oral, BID with meals  ondansetron, , ,   oxygen, , inhalation, Continuous - 02/gases  pantoprazole, 20 mg, oral, Daily before breakfast  perflutren lipid microspheres, 0.5 mL, intravenous, Once in imaging  perflutren protein A microsphere, 0.5 mL, intravenous, Once in imaging  sodium chloride, 500 mL, intravenous, Once  sulfur hexafluoride microsphr, 2 mL, intravenous, Once in imaging  ticagrelor, 90 mg, oral, BID         Continuous Medications:   sodium chloride 0.9%, 1.5 mL/kg/hr         PRN Medications:   PRN medications: nitroglycerin, ondansetron    Objective   Vitals:  Most Recent:  Vitals:    10/01/23 0432   BP: 151/63   Pulse: 62   Resp: 13   Temp: 36.6 °C (97.9 °F)   SpO2:        24hr Min/Max:  Temp  Min: 36.6 °C (97.9 °F)  Max: 36.6 °C (97.9 °F)  Pulse  Min: 62  Max: 85  BP  Min: 151/63  Max: 186/86  Resp  Min: 13  Max: 20  SpO2  Min: 97 %  Max: 97 %    LDA:           Vent settings:       Hemodynamic parameters for last 24 hours:         Intake/Output Summary (Last 24 hours) at 10/1/2023 0437  Last data filed at 10/1/2023 0300  Gross per 24 hour   Intake --   Output 5 ml   Net -5 ml       Physical exam:    Constitutional: Well-developed  female.  Patient is supine in bed with head   elevated.  Patient is not ill appearing.    Eyes: Conjunctiva is pink, sclerae shows no sign of infection or jaundice.  PERRLA.    ENMT Ears:  Nasal pharynx mucosa is pink and moist.  Posterior oropharynx is without   lesions or exudate.    Neck: Neck is supple, no apparent injury, thyroid without mass or tenderness, No JVD, Trachea is midline, No Bruits    Respiratory: Lungs are clear to auscultation all fields.  No wheezes, rhonchi or rales appreciated.  No   increased work of breathing.  No accessory muscle use.  No retractions noted.  Good chest expansion,   thorax symmetrical    Cardiovascular: Regular rate and rhythm noted.  S1 and S2 no rubs, clicks, or gallops   noted.  2+ equal peripheral pulses in all extremities.  No JVD Present    Gastrointestinal: Non-distended, No pain on palpation.  Bowel sounds are normal.   No hepatosplenomegaly noted.  Patient is negative for rebound tenderness in all quadrants. No Bruits    Genitourinary: NA    Musculoskeletal: No Weaknesses ROM intact.  Normal extremities, no cyanosis, edema, contusions, or wounds, or clubbing    Skin: Skin is warm and dry, no lesions, no rashes    Neurologic: Awake, Alert, and Oriented x3.  GCS 15, No Focal Deficits, Symmetric strength 5/5 in the upper and lower extremities bilaterally    Lymphatic:      Psychiatric: Mood and thoughts are appropriate.  No evidence of disordered thinking noted.      Lab/Radiology/Diagnostic Review:  Results for orders placed or performed during the hospital encounter of 10/01/23 (from the past 24 hour(s))   CBC and Auto Differential   Result Value Ref Range    WBC 9.9 4.4 - 11.3  x10*3/uL    nRBC 0.0 0.0 - 0.0 /100 WBCs    RBC 4.98 4.00 - 5.20 x10*6/uL    Hemoglobin 11.4 (L) 12.0 - 16.0 g/dL    Hematocrit 38.0 36.0 - 46.0 %    MCV 76 (L) 80 - 100 fL    MCH 22.9 (L) 26.0 - 34.0 pg    MCHC 30.0 (L) 32.0 - 36.0 g/dL    RDW 16.2 (H) 11.5 - 14.5 %    Platelets 331 150 - 450 x10*3/uL    MPV 9.9 7.5 - 11.5 fL    Neutrophils % 67.1 40.0 - 80.0 %    Immature Granulocytes %, Automated 0.7 0.0 - 0.9 %    Lymphocytes % 20.6 13.0 - 44.0 %    Monocytes % 6.9 2.0 - 10.0 %    Eosinophils % 3.9 0.0 - 6.0 %    Basophils % 0.8 0.0 - 2.0 %    Neutrophils Absolute 6.63 1.20 - 7.70 x10*3/uL    Immature Granulocytes Absolute, Automated 0.07 0.00 - 0.70 x10*3/uL    Lymphocytes Absolute 2.03 1.20 - 4.80 x10*3/uL    Monocytes Absolute 0.68 0.10 - 1.00 x10*3/uL    Eosinophils Absolute 0.38 0.00 - 0.70 x10*3/uL    Basophils Absolute 0.08 0.00 - 0.10 x10*3/uL   Comprehensive Metabolic Panel   Result Value Ref Range    Glucose 123 (H) 74 - 99 mg/dL    Sodium 139 136 - 145 mmol/L    Potassium 3.3 (L) 3.5 - 5.3 mmol/L    Chloride 102 98 - 107 mmol/L    Bicarbonate 25 21 - 32 mmol/L    Anion Gap 15 10 - 20 mmol/L    Urea Nitrogen 20 6 - 23 mg/dL    Creatinine 0.87 0.50 - 1.05 mg/dL    eGFR 74 >60 mL/min/1.73m*2    Calcium 9.8 8.6 - 10.3 mg/dL    Albumin 4.3 3.4 - 5.0 g/dL    Alkaline Phosphatase 50 33 - 136 U/L    Total Protein 8.3 (H) 6.4 - 8.2 g/dL    AST 28 9 - 39 U/L    Bilirubin, Total 0.3 0.0 - 1.2 mg/dL    ALT 22 7 - 45 U/L   Magnesium   Result Value Ref Range    Magnesium 1.70 1.60 - 2.40 mg/dL   D-dimer, quantitative   Result Value Ref Range    D-Dimer Non VTE, Quant (ng/mL FEU) 690 (H) <=500 ng/mL FEU   Troponin I, High Sensitivity, Initial   Result Value Ref Range    Troponin I, High Sensitivity 210 (HH) 0 - 13 ng/L     XR chest 1 view    Result Date: 10/1/2023  Interpreted By:  Nate Borges, STUDY: XR CHEST 1 VIEW;  10/1/2023 12:16 am   INDICATION: Chest pain..   COMPARISON: 10/07/2012   ACCESSION  NUMBER(S): [id number]   ORDERING CLINICIAN: ROME ALMEIDA   FINDINGS:     The cardiomediastinal silhouette and pulmonary vasculature are within normal limits. No consolidation, pleural effusion or pneumothorax.         No acute cardiopulmonary process.     MACRO: None.   Signed by: Nate Borges 10/1/2023 12:27 AM Dictation workstation:   [id number]      Additional Labs:      Assessment /Plan    Assessment:  Patient is a 63 y.o. female admitted on 10/1/2023 12:05 AM  with chief complaint of with chief complaint of Chest pain.  Patient states that she has had a 3-day history of midsternal chest pain with no radiation that she states was constant and was an 8 out of 10 on admission.  She states that walking made it better but lying in bed made it worse.  She states that she would only get this chest pain at night.  She states that it got worse this evening with shortness of breath and decided to come to the emergency department.     Impression:  STEMI with intervention  HTN  HLD  GERD  Anxiety    Plan:  #STEMI  Admit to ICU  Continuous Telemetry  Vitals per ICU Protocol  Weight on Admission  Strict I&O  Cardiac Diet  Monitor for SIRS criteria  Obtain blood cultures x 2 for temp 38C or greater  Consult Cardiology and appreciate recommendations  ECHO in AM  CBC, CMP, Coagulation Panel, Troponin in AM  HgbA1C, Lipid Panel, TSH in am  12 Lead in AM  IVF - NS @ 100ml/hr  Start Atorvastatin 80mg PO daily  Start Brilinta 90mg BID 12 hours after bolus dose  Start 81mg ASA Daily  Start Coreg 6.25mg Daily  Hold Home Omega 3    #Hypokalemia  On admission: 3.3  Etiology: likely from ETOH abuse and not eating  Check Magnesium level  BMP in am  Potassium replacement of 40mEq liquid.  #HTN  Hold home Bisoprolol-HCTZ  Hold home Valsartan    #HLD  Continue home Atorvastatin  Continue home Fenofibrate    #GERD  Continue home Omeprazole    #Anxiety  Continue home Paxil    #Misc. Medications  Continue home Vitamin D3    Fluids: NS  @ 100ml/hr  Electrolytes: monitor and correct abnormalities as indicated  Nutrition: NPO  GI prophylaxis:   DVT prophylaxis: SCD,   Antimicrobials:   Oxygen: Maintain >92% SPO2,   Analgesia:   Sedation:   Glycemic control: None  Bowel care: As needed  Indwelling catheter: None  Lines: Peripheral IVs,     Code Status: Full Code    Patient was discussed with Dr. Gharibeh who agreed with the assessment and plan.    Disposition:  Admit to ICU    Pavel Mcpherson DNP, APRN, [facility]  Critical Care Nurse Practitioner  Ext. [phone]  [facility]    *Of note, this documentation is completed using the Dragon Dictation system (voice recognition software). There may be spelling and/or grammatical errors that were not corrected prior to final submission. *      I spent 50 minutes in the professional and overall care of this patient.

## 2023-10-01 NOTE — CONSULTS
INTERVENTIONAL CARDIOLOGY    History Of Present Illness:    Audrey Estrada is a 63 y.o. female presenting with chest pain found to have lateral wall STEMI.  Patient reports having intermittent chest pain for the last 3 days, however tonight the pain started again and was worse, reason why she presented to the emergency room.  In the ED found to have lateral ST elevations.      Last Recorded Vitals:  Vitals:    09/30/23 2347 10/01/23 0012 10/01/23 0023   BP:  (!) 186/90 (!) 186/86   Pulse: 82 85 81   Resp: 18 20 18   Temp: 36.6 °C (97.9 °F)     TempSrc: Temporal     SpO2: 97% 97% 97%   Weight: 80 kg (176 lb 5.9 oz)         Last Labs:  CBC - 10/1/2023: 12:31 AM  9.9 11.4 331    38.0      CMP - 10/1/2023: 12:31 AM  9.8 8.3 28 --- 0.3   _ 4.3 22 50      PTT - No results in last year.  _   _ _     Troponin I, High Sensitivity   Date/Time Value Ref Range Status   10/01/2023 12:31  (HH) 0 - 13 ng/L Final     Hemoglobin A1C   Date/Time Value Ref Range Status   08/08/2022 02:49 PM 6.0 (A) % Final     Comment:          Diagnosis of Diabetes-Adults   Non-Diabetic: < or = 5.6%   Increased risk for developing diabetes: 5.7-6.4%   Diagnostic of diabetes: > or = 6.5%  .       Monitoring of Diabetes                Age (y)     Therapeutic Goal (%)   Adults:          >18           <7.0   Pediatrics:    13-18           <7.5                   7-12           <8.0                   0- 6            7.5-8.5   American Diabetes Association. Diabetes Care 33(S1), Jan 2010.       VLDL   Date/Time Value Ref Range Status   06/20/2022 11:56 AM 39 0 - 40 mg/dL Final   06/02/2021 03:04 PM 44 (H) 0 - 40 mg/dL Final   02/16/2021 01:35 PM 48 (H) 0 - 40 mg/dL Final      Last I/O:  No intake/output data recorded.    Past Cardiology Tests (Last 3 Years):  EKG:  NSR, Lateral JAIME    Past Medical History:  She has a past medical history of Hyperlipidemia and Hypertension.    Past Surgical History:  She has a past surgical history that includes US  guided thyroid biopsy (12/13/2019) and US aspiration injection intermediate joint (12/23/2019).      Social History:  She reports that she has been smoking cigarettes. She does not have any smokeless tobacco history on file. She reports that she does not currently use alcohol. She reports that she does not use drugs.    Family History:  Family History   Problem Relation Name Age of Onset    Heart attack Father          Allergies:  Patient has no known allergies.    Inpatient Medications:  Scheduled medications   Medication Dose Route Frequency    aspirin  81 mg oral Daily    atorvastatin  80 mg oral Once    ondansetron        sodium chloride  500 mL intravenous Once     PRN medications   Medication    nitroglycerin    ondansetron     Continuous Medications   Medication Dose Last Rate     Outpatient Medications:  Current Outpatient Medications   Medication Instructions    bisoproloL-hydrochlorothiazide (Ziac) 10-6.25 mg tablet 1 tablet, oral, Daily    fenofibrate (TRIGLIDE) 160 mg, oral, Every other day    FLUoxetine (PROZAC) 20 mg, oral, Daily    omega-3 acid ethyl esters (Lovaza) 1 gram capsule TAKE 1 CAPSULE BY MOUTH TWICE A DAY    valsartan (Diovan) 80 mg tablet TAKE 1 TABLET DAILY       Physical Exam:  General: NAD, well-appearing  HEENT: moist mucous membranes, no jaundice  Neck: No JVD, no carotid bruit  Lungs: CTA luisa, no wheezing or rales  Cardiac: RRR, no murmurs  Abdomen: soft, non-tender, non-distended  Extremities: 2+ radial pulses, no edema, no wounds  Skin: warm, dry  Neurologic: AAOx3,  no focal deficits       Assessment/Plan   63-year-old woman presenting with chest pain found to have lateral STEMI, now status post intervention with PCI with CARLOS ALBERTO to mid circumflex and PCI with CARLOS ALBERTO to obtuse marginal    -Patient tolerated procedure well without complications  -Loaded with Brilinta and aspirin on arrival.  Continue DAPT with aspirin and Brilinta.  Start high intensity statin  -Obtain echocardiogram  tomorrow  -IV fluids per protocol for ANGE prevention  -Start carvedilol 6.25 mg twice daily and uptitrate as needed  -Patient has evidence of RCA disease that should be addressed before discharge.  Based on kidney function we will plan to address likely Monday  -Smoking cessation counseling  -Cardiac rehab referral    Code Status:  No Order     I spent >90 minutes in the professional and overall care of this patient.        Nish Parnell MD

## 2023-10-01 NOTE — Clinical Note
Single view of the right coronary artery obtained using power injection. Rate = 4 mL/sec. Total volume = 8 mL. Post angio

## 2023-10-01 NOTE — Clinical Note
Angioplasty of the proximal right coronary artery lesion. Inflation 1: Pressure = 12 megan; Duration = 10 sec. Inflation 2: Pressure = 12 megan; Duration = 10 sec. Inflation 3: Pressure = 12 megan; Duration = 5 sec.

## 2023-10-01 NOTE — Clinical Note
Angioplasty of the proximal right coronary artery lesion. Inflation 1: Pressure = 12 megan; Duration = 5 sec. Inflation 2: Pressure = 18 megan; Duration = 20 sec.

## 2023-10-01 NOTE — Clinical Note
Patient Clipped and Prepped: right radial. Prepped with ChloraPrep, a minimum of 3 minute dry time, longer if needed, no pooling noted, patient draped in sterile fashion.

## 2023-10-01 NOTE — PROGRESS NOTES
Subjective Data:  Patient sitting up in bed stable.  Denies any chest pain shortness of breath or other complaints.  Tolerating medication.  Remaining in sinus rhythm.  Vital signs are normal.    Overnight Events:    No specific overnight events of concern.     Objective Data:  Last Recorded Vitals:  Vitals:    10/01/23 0800 10/01/23 0821 10/01/23 0900 10/01/23 1000   BP: 135/76 135/76 171/76 175/68   BP Location:  Left arm     Patient Position:  Lying     Pulse: 72 74 74 68   Resp: 20 18 (!) 30 25   Temp:       TempSrc:       SpO2: 99% 98% 100% 98%   Weight:       Height:           Last Labs:  CBC - 10/1/2023:  4:37 AM  8.8 8.9 226    30.4      CMP - 10/1/2023:  4:37 AM  8.6 8.3 28 --- 0.3   4.4 3.4 22 50      PTT - 10/1/2023:  4:37 AM  1.1   12.5 51     TROPHS   Date/Time Value Ref Range Status   10/01/2023 08:47 AM 1,105 0 - 13 ng/L Final   10/01/2023 04:37  0 - 13 ng/L Final   10/01/2023 12:31  0 - 13 ng/L Final     HGBA1C   Date/Time Value Ref Range Status   08/08/2022 02:49 PM 6.0 % Final     Comment:          Diagnosis of Diabetes-Adults   Non-Diabetic: < or = 5.6%   Increased risk for developing diabetes: 5.7-6.4%   Diagnostic of diabetes: > or = 6.5%  .       Monitoring of Diabetes                Age (y)     Therapeutic Goal (%)   Adults:          >18           <7.0   Pediatrics:    13-18           <7.5                   7-12           <8.0                   0- 6            7.5-8.5   American Diabetes Association. Diabetes Care 33(S1), Jan 2010.       LDLCALC   Date/Time Value Ref Range Status   10/01/2023 04:37 AM 92 140 - 190 mg/dL Final     Comment:                                 Near   Borderline      AGE      Desirable  Optimal    High     High     Very High     0-19 Y     0 - 109     ---    110-129   >/= 130     ----    20-24 Y     0 - 119     ---    120-159   >/= 160     ----      >24 Y     0 -  99   100-129  130-159   160-189     >/=190       VLDL   Date/Time Value Ref Range Status    10/01/2023 04:37 AM 38 0 - 40 mg/dL Final   06/20/2022 11:56 AM 39 0 - 40 mg/dL Final   06/02/2021 03:04 PM 44 0 - 40 mg/dL Final   02/16/2021 01:35 PM 48 0 - 40 mg/dL Final      Last I/O:  I/O last 3 completed shifts:  In: - (0 mL/kg)   Out: 5 (0.1 mL/kg) [Blood:5]  Weight: 79.8 kg     Past Cardiology Tests (Last 3 Years):  EKG:  Normal sinus rhythm with nonspecific ST-T wave changes    Echo:  Pending    Ejection Fractions:  Pending  Cath:  Verbal report of multivessel disease with circumflex and OM branch intervention and stents yesterday acutely.  Residual RCA occlusion to be intervened upon tomorrow by Dr. Mendes.    Stress Test:  Nuclear Stress Test 2/13/2023    Cardiac Imaging:  No results found for this or any previous visit from the past 1095 days.      Inpatient Medications:  Scheduled medications   Medication Dose Route Frequency    aspirin  81 mg oral Daily    atorvastatin  80 mg oral Once    carvedilol  6.25 mg oral BID    cholecalciferol  25 mcg oral Daily    FLUoxetine  20 mg oral Daily    icosapent ethyL  2 g oral BID with meals    oxygen   inhalation Continuous - 02/gases    pantoprazole  20 mg oral Daily before breakfast    perflutren lipid microspheres  0.5 mL intravenous Once in imaging    perflutren protein A microsphere  0.5 mL intravenous Once in imaging    sulfur hexafluoride microsphr  2 mL intravenous Once in imaging    ticagrelor  90 mg oral BID     PRN medications   Medication    hydrALAZINE    nitroglycerin     Continuous Medications   Medication Dose Last Rate    sodium chloride 0.9%  125 mL/hr 125 mL/hr (10/01/23 0633)     Patient Active Problem List   Diagnosis    HTN (hypertension)    ST elevation myocardial infarction (STEMI), unspecified artery (CMS/HCC)    Depressive disorder    Gastroesophageal reflux disease    Hyperlipidemia    NAFLD (nonalcoholic fatty liver disease)    Nicotine dependence    Thyroid nodule        Physical Exam:  Constitutional:       General: Awake.       Appearance: Normal appearance. Well-developed and well-groomed.   Eyes:      Conjunctiva/sclera: Conjunctivae normal.      Pupils: Pupils are equal, round, and reactive to light.   HENT:      Head: Normocephalic and atraumatic.      Ears: Tympanic membranes normal.      Nose: Nose normal.    Mouth/Throat:      Dentition: Normal.      Pharynx: Oropharynx is clear.   Neck:      Thyroid: Thyroid normal.   Pulmonary:      Effort: Pulmonary effort is normal.      Breath sounds: Normal breath sounds.   Cardiovascular:      PMI at left midclavicular line. Normal rate. Regular rhythm. Normal S1. Normal S2.       Murmurs: There is no murmur.      No gallop.  No click. No rub.   Pulses:     Intact distal pulses.   Edema:     Peripheral edema absent.   Abdominal:      General: Bowel sounds are normal.      Palpations: Abdomen is soft.   Musculoskeletal: Normal range of motion.      Cervical back: Normal range of motion and neck supple. Skin:     General: Skin is warm and dry.   Neurological:      Mental Status: Alert and oriented to person, place and time.      Cranial Nerves: Cranial nerves 2-12 are intact.      Motor: Motor function is intact.      Gait: Gait is intact.      Deep Tendon Reflexes: Reflexes are normal and symmetric.   Psychiatric:         Behavior: Behavior is cooperative.           Assessment/Plan   63-year-old female seen and evaluated at the bedside in the medical intensive care unit post myocardial infarction day #1.    Bedside examination evaluation were performed by me.    Chart was reviewed in detail including lab, EKG, x-rays and other data available and discussed with the patient and staff.  Also discussed with Dr. Mendes who was the performing interventional list yesterday.    Impression and plan as noted.    At the present time the patient is stable.  Patient is free of any angina.  Vital signs show some mild hypertension.  Planning to add losartan 50 and increase Coreg to 12 and half twice a day.   We will adjust medications accordingly.  Patient may be scheduled to go to the eighth floor telemetry unit for the remaining part of the day and is scheduled tomorrow for RCA intervention.  We will adjust medications.  We will continue to follow labs and EKGs.  Peripheral IV 10/01/23 20 G Right Antecubital (Active)   Site Assessment Clean;Dry;Intact 10/01/23 0018   Dressing Status Clean;Dry 10/01/23 0018   Number of days: 0       Peripheral IV 10/01/23 20 G Left;Anterior Hand (Active)   Site Assessment Clean;Intact;Dry 10/01/23 0026   Dressing Status Dry;Clean 10/01/23 0026   Number of days: 0       Code Status:  Full Code            Norman Franklin DO

## 2023-10-01 NOTE — Clinical Note
Angioplasty of the proximal right coronary artery lesion. Inflation 1: Pressure = 12 megan; Duration = 16 sec. Inflation 2: Pressure = 8 megan; Duration = 10 sec. Inflation 3: Pressure = 10 megan; Duration = 10 sec.

## 2023-10-01 NOTE — Clinical Note
Vessel: circumflex (2nd marginal). Balloon removed due to being unable to cross lesion. NC Euphora 2.5x12

## 2023-10-01 NOTE — Clinical Note
Vessel: circumflex (1st marginal). Balloon removed due to being unable to cross lesion. NC Euphora 3.5x8

## 2023-10-01 NOTE — POST-PROCEDURE NOTE
Physician Transition of Care Summary  Invasive Cardiovascular Lab    Procedure Date: 10/1/2023  Attending:    * Invasive Cardiologist Candelarioid - Primary     * Nish Parnell  Resident/Fellow/Other Assistant: Cardiac cath lab team    Pre Procedure Indications:   ACS less than or equal to 24 hours STEMI     Post Procedure Diagnosis:   Lateral wall STEMI    Procedure(s):   FFR / IVUS / OCT, Left Heart Catheterization, and Percutaneous Coronary Intervention    Procedure Findings:   Mid left circumflex 99% stenosis and OM 99% stenosis     Description of the Procedure:   PCI with drug-eluting stent to mid circumflex, and drug eluting stent to obtuse marginal via right radial access    Complications:   None    Stents/Implants:   2.75 x 22 mm Davidson drug-eluting stent to mid circumflex.  Postdilated to 3.0 mm and 3.5 mm proximally  2.25 x 22 mm Davidson drug-eluting stent to obtuse marginal.  Postdated to 2.5 mm    Anticoagulation/Antiplatelet Plan:   DAPT with aspirin and ticagrelor    Estimated Blood Loss:   20 ml    Anesthesia: Moderate Sedation Anesthesia Staff: No anesthesia staff entered.    Any Specimen(s) Removed:   No specimens collected during this procedure.    Disposition:   MICU      Electronically signed by: Nish Parnell MD, 10/1/2023 3:07 AM

## 2023-10-01 NOTE — Clinical Note
Multiple views of the right coronary artery obtained using power injection. Rate = 4 mL/sec. Total volume = 8 mL. Post angio taken

## 2023-10-01 NOTE — Clinical Note
Multiple views of the right coronary artery obtained using power injection. Rate = 4 mL/sec. Total volume = 8 mL.

## 2023-10-01 NOTE — ED PROVIDER NOTES
HPI   Chief Complaint   Patient presents with    Chest Pain     Pt c/o CP X3 days, worsening at night, non provoked, non radiating.  Pt describes pain as pressure in chest 8/10.  +SOB       63-year-old female presents emergency department with chief complaint of chest pain.  Patient reports she has been getting midsternal chest pain intermittently for the past 3 days.  She states the pain was significantly worse this evening leading her to come to the emergency department.  She denies fevers, coughing, or congestion.  She does report associated shortness of breath as well as lightheadedness.  She describes the pain as a pressure sensation.  Denies any alleviating or exacerbating factors.  No abdominal pain, dysuria, diarrhea, constipation, black or bloody stools.  She admits to nausea but no vomiting.  Admits to history of hyperlipidemia and hypertension.  Denies any illicit drug use or regular alcohol use.  States she is not on any anticoagulation.  Denies any injury or trauma to the chest.      Review of Systems      Constitutional: no fever, chills reports malaise  Eyes: no redness, discharge, pain  HENT: no sore throat, nose bleeds, congestion, rhinorrhea   Cardiovascular: Admits to chest pain no leg edema, palpitations  Respiratory: Reports shortness of breath no cough, wheezing  GI: Admits to nausea no diarrhea, pain, vomiting, constipation, BRBPR, melena  : no dysuria, frequency, hematuria  Musculoskeletal: no neck pain, stiffness,  no joint deformity, swelling  Skin: no rash, erythema, wounds  Neurological: no headache, dizziness, weakness, numbness, or tingling reports feeling lightheaded like she may pass out  Psychiatric: no suicidal thoughts, confusion, agitation  Metabolic: no polyuria or polydipsia  Hematologic: no increased bleeding or bruising  Immunology: No immunocompromise state    [facility]    Nursing notes reviewed and confirmed by me.  Chart review performed including medications, allergies, and  medical, surgical, and family history                                    No data recorded                Patient History   Past Medical History:   Diagnosis Date    Anxiety     GERD (gastroesophageal reflux disease)     Hyperlipemia     Hyperlipidemia     Hypertension      Past Surgical History:   Procedure Laterality Date    CORONARY ANGIOPLASTY WITH STENT PLACEMENT      US ASPIRATION INJECTION INTERMEDIATE JOINT  12/23/2019    US ASPIRATION INJECTION INTERMEDIATE JOINT 12/23/2019 [facility]    US GUIDED THYROID BIOPSY  12/13/2019    US GUIDED THYROID BIOPSY 12/13/2019 [facility]     Family History   Problem Relation Name Age of Onset    Heart attack Father       Social History     Tobacco Use    Smoking status: Every Day     Packs/day: 1.00     Years: 15.00     Additional pack years: 0.00     Total pack years: 15.00     Types: Cigarettes    Smokeless tobacco: Never   Vaping Use    Vaping Use: Never used   Substance Use Topics    Alcohol use: Never    Drug use: Never       Physical Exam   ED Triage Vitals   Temp Heart Rate Resp BP   09/30/23 2347 09/30/23 2347 09/30/23 2347 10/01/23 0012   36.6 °C (97.9 °F) 82 18 (!) 186/90      SpO2 Temp Source Heart Rate Source Patient Position   09/30/23 2347 09/30/23 2347 09/30/23 2347 09/30/23 2347   97 % Temporal Monitor Sitting      BP Location FiO2 (%)     09/30/23 2347 --     Left arm        Physical Exam  Vitals and nursing note reviewed.   Constitutional:       Appearance: She is well-developed.      Comments: Patient appears uncomfortable on my exam.  She reports chest pain   HENT:      Head: Normocephalic and atraumatic.      Right Ear: External ear normal.      Left Ear: External ear normal.      Nose: Nose normal.      Mouth/Throat:      Mouth: Mucous membranes are moist.   Eyes:      Extraocular Movements: Extraocular movements intact.      Conjunctiva/sclera: Conjunctivae normal.      Pupils: Pupils are equal, round, and reactive to light.    Cardiovascular:      Rate and Rhythm: Normal rate and regular rhythm.      Pulses: Normal pulses.      Heart sounds: No murmur heard.  Pulmonary:      Effort: Pulmonary effort is normal. No respiratory distress.      Breath sounds: Normal breath sounds. No wheezing or rhonchi.   Abdominal:      General: There is no distension.      Palpations: Abdomen is soft.      Tenderness: There is no abdominal tenderness. There is no guarding.      Hernia: No hernia is present.   Musculoskeletal:         General: No swelling, tenderness or deformity.      Cervical back: Normal range of motion and neck supple. No rigidity.      Right lower leg: No edema.      Left lower leg: No edema.   Skin:     General: Skin is warm and dry.      Capillary Refill: Capillary refill takes less than 2 seconds.      Findings: No erythema or rash.   Neurological:      General: No focal deficit present.      Mental Status: She is alert and oriented to person, place, and time.      Cranial Nerves: No cranial nerve deficit.      Sensory: No sensory deficit.      Motor: No weakness.   Psychiatric:         Mood and Affect: Mood normal.         Behavior: Behavior normal.         ED Course & MDM   Diagnoses as of 10/03/23 1751   ST elevation myocardial infarction (STEMI), unspecified artery (CMS/HCC)       Medical Decision Making  EKG interpreted by ED physician: Sinus rhythm rate of 76.  ME, QRS within normal range.  QTc borderline at 495.  Significant ST depressions appreciated in lateral leads with elevations in 1 and aVL.  Findings concerning for high lateral STEMI.    63-year-old female presented to emergency department with complaints of chest pain over the past 3 days.  The EKG was performed in triage and when I evaluated it I appreciated findings concerning for STEMI.  Patient had depressions in the lateral leads with elevations of aVL and lead I.  I activated code purple immediately upon reviewing EKG and patient was brought directly back to  the emergency department.  Advised her of her abnormal findings and likely need for Cath Lab.  Patient given heparin, Brilinta, aspirin, and Zofran.  Patient was also given nitro.  I discussed this case with Dr. Mendes interventional cardiology on-call and transmitted EKGs to her.  Chest pain work-up was obtained, but pending at time of disposition patient transferred directly to Cath Lab from the emergency department.  Review of work-up ordered here in the emergency department does show significantly elevated cardiac enzymes.  CMP shows mild hypokalemia.  CBC shows mild anemia no significant leukocytosis.  D-dimer is elevated at 698.  Magnesium within normal range.  Chest x-ray did not show any acute cardiopulmonary process.      Labs Reviewed   CBC WITH AUTO DIFFERENTIAL - Abnormal       Result Value    WBC 9.9      nRBC 0.0      RBC 4.98      Hemoglobin 11.4 (*)     Hematocrit 38.0      MCV 76 (*)     MCH 22.9 (*)     MCHC 30.0 (*)     RDW 16.2 (*)     Platelets 331      MPV 9.9      Neutrophils % 67.1      Immature Granulocytes %, Automated 0.7      Lymphocytes % 20.6      Monocytes % 6.9      Eosinophils % 3.9      Basophils % 0.8      Neutrophils Absolute 6.63      Immature Granulocytes Absolute, Automated 0.07      Lymphocytes Absolute 2.03      Monocytes Absolute 0.68      Eosinophils Absolute 0.38      Basophils Absolute 0.08     COMPREHENSIVE METABOLIC PANEL - Abnormal    Glucose 123 (*)     Sodium 139      Potassium 3.3 (*)     Chloride 102      Bicarbonate 25      Anion Gap 15      Urea Nitrogen 20      Creatinine 0.87      eGFR 74      Calcium 9.8      Albumin 4.3      Alkaline Phosphatase 50      Total Protein 8.3 (*)     AST 28      Bilirubin, Total 0.3      ALT 22     D-DIMER, NON VTE - Abnormal    D-Dimer Non VTE, Quant (ng/mL FEU) 690 (*)     Narrative:     The D-Dimer assay is reported in ng/mL Fibrinogen Equivalent Units (FEU). The results of this assay should NOT be used for the exclusion of  Deep Vein Thrombosis and/or Pulmonary Embolism.   SERIAL TROPONIN-INITIAL - Abnormal    Troponin I, High Sensitivity 210 (*)     Narrative:     Less than 99th percentile of normal range cutoff-  Female and children under 18 years old <14 ng/L; Male <21 ng/L: Negative  Repeat testing should be performed if clinically indicated.     Female and children under 18 years old 14-50 ng/L; Male 21-50 ng/L:  Consistent with possible cardiac damage and possible increased clinical   risk. Serial measurements may help to assess extent of myocardial damage.     >50 ng/L: Consistent with cardiac damage, increased clinical risk and  myocardial infarction. Serial measurements may help assess extent of   myocardial damage.      NOTE: Children less than 1 year old may have higher baseline troponin   levels and results should be interpreted in conjunction with the overall   clinical context.     NOTE: Troponin I testing is performed using a different   testing methodology at [facility] than at other   [facility]. Direct result comparisons should only   be made within the same method.   SERIAL TROPONIN, 1 HOUR - Abnormal    Troponin I, High Sensitivity 454 (*)     Narrative:     Less than 99th percentile of normal range cutoff-  Female and children under 18 years old <14 ng/L; Male <21 ng/L: Negative  Repeat testing should be performed if clinically indicated.     Female and children under 18 years old 14-50 ng/L; Male 21-50 ng/L:  Consistent with possible cardiac damage and possible increased clinical   risk. Serial measurements may help to assess extent of myocardial damage.     >50 ng/L: Consistent with cardiac damage, increased clinical risk and  myocardial infarction. Serial measurements may help assess extent of   myocardial damage.      NOTE: Children less than 1 year old may have higher baseline troponin   levels and results should be interpreted in conjunction with the overall   clinical context.     NOTE:  Troponin I testing is performed using a different   testing methodology at [facility] than at other   [facility]. Direct result comparisons should only   be made within the same method.   CBC - Abnormal    WBC 8.8      nRBC 0.0      RBC 3.95 (*)     Hemoglobin 8.9 (*)     Hematocrit 30.4 (*)     MCV 77 (*)     MCH 22.5 (*)     MCHC 29.3 (*)     RDW 16.1 (*)     Platelets 226      MPV 9.8     LIPID PANEL - Abnormal    Cholesterol 158      HDL-Cholesterol 28.7      Cholesterol/HDL Ratio 5.5      LDL Calculated 92 (*)     VLDL 38      Triglycerides 189 (*)     Non HDL Cholesterol 129     HEMOGLOBIN A1C - Abnormal    Hemoglobin A1C 6.2 (*)     Estimated Average Glucose 131     COAGULATION SCREEN - Abnormal    Protime 12.5      INR 1.1      aPTT 51 (*)     Narrative:     The APTT is no longer used for monitoring Unfractionated Heparin Therapy. For monitoring Heparin Therapy, use the Heparin Assay.   TROPONIN I, HIGH SENSITIVITY - Abnormal    Troponin I, High Sensitivity 1,105 (*)     Narrative:     Less than 99th percentile of normal range cutoff-  Female and children under 18 years old <14 ng/L; Male <21 ng/L: Negative  Repeat testing should be performed if clinically indicated.     Female and children under 18 years old 14-50 ng/L; Male 21-50 ng/L:  Consistent with possible cardiac damage and possible increased clinical   risk. Serial measurements may help to assess extent of myocardial damage.     >50 ng/L: Consistent with cardiac damage, increased clinical risk and  myocardial infarction. Serial measurements may help assess extent of   myocardial damage.      NOTE: Children less than 1 year old may have higher baseline troponin   levels and results should be interpreted in conjunction with the overall   clinical context.     NOTE: Troponin I testing is performed using a different   testing methodology at [facility] than at other   [facility] hospitals. Direct result comparisons should  only   be made within the same method.   TROPONIN I, HIGH SENSITIVITY - Abnormal    Troponin I, High Sensitivity 1,609 (*)     Narrative:     Less than 99th percentile of normal range cutoff-  Female and children under 18 years old <14 ng/L; Male <21 ng/L: Negative  Repeat testing should be performed if clinically indicated.     Female and children under 18 years old 14-50 ng/L; Male 21-50 ng/L:  Consistent with possible cardiac damage and possible increased clinical   risk. Serial measurements may help to assess extent of myocardial damage.     >50 ng/L: Consistent with cardiac damage, increased clinical risk and  myocardial infarction. Serial measurements may help assess extent of   myocardial damage.      NOTE: Children less than 1 year old may have higher baseline troponin   levels and results should be interpreted in conjunction with the overall   clinical context.     NOTE: Troponin I testing is performed using a different   testing methodology at [facility] than at other   [facility]. Direct result comparisons should only   be made within the same method.   TROPONIN I, HIGH SENSITIVITY - Abnormal    Troponin I, High Sensitivity 1,214 (*)     Narrative:     Less than 99th percentile of normal range cutoff-  Female and children under 18 years old <14 ng/L; Male <21 ng/L: Negative  Repeat testing should be performed if clinically indicated.     Female and children under 18 years old 14-50 ng/L; Male 21-50 ng/L:  Consistent with possible cardiac damage and possible increased clinical   risk. Serial measurements may help to assess extent of myocardial damage.     >50 ng/L: Consistent with cardiac damage, increased clinical risk and  myocardial infarction. Serial measurements may help assess extent of   myocardial damage.      NOTE: Children less than 1 year old may have higher baseline troponin   levels and results should be interpreted in conjunction with the overall   clinical context.      NOTE: Troponin I testing is performed using a different   testing methodology at [facility] than at other   [facility]. Direct result comparisons should only   be made within the same method.   TROPONIN I, HIGH SENSITIVITY - Abnormal    Troponin I, High Sensitivity 837 (*)     Narrative:     Less than 99th percentile of normal range cutoff-  Female and children under 18 years old <14 ng/L; Male <21 ng/L: Negative  Repeat testing should be performed if clinically indicated.     Female and children under 18 years old 14-50 ng/L; Male 21-50 ng/L:  Consistent with possible cardiac damage and possible increased clinical   risk. Serial measurements may help to assess extent of myocardial damage.     >50 ng/L: Consistent with cardiac damage, increased clinical risk and  myocardial infarction. Serial measurements may help assess extent of   myocardial damage.      NOTE: Children less than 1 year old may have higher baseline troponin   levels and results should be interpreted in conjunction with the overall   clinical context.     NOTE: Troponin I testing is performed using a different   testing methodology at [facility] than at other   [facility]. Direct result comparisons should only   be made within the same method.   CBC WITH AUTO DIFFERENTIAL - Abnormal    WBC 8.6      nRBC 0.0      RBC 4.13      Hemoglobin 9.2 (*)     Hematocrit 31.5 (*)     MCV 76 (*)     MCH 22.3 (*)     MCHC 29.2 (*)     RDW 16.3 (*)     Platelets 232      MPV 9.6      Neutrophils % 76.2      Immature Granulocytes %, Automated 0.3      Lymphocytes % 13.3      Monocytes % 6.1      Eosinophils % 3.6      Basophils % 0.5      Neutrophils Absolute 6.56      Immature Granulocytes Absolute, Automated 0.03      Lymphocytes Absolute 1.15 (*)     Monocytes Absolute 0.53      Eosinophils Absolute 0.31      Basophils Absolute 0.04     BASIC METABOLIC PANEL - Abnormal    Glucose 98      Sodium 140      Potassium 3.3  (*)     Chloride 108 (*)     Bicarbonate 24      Anion Gap 11      Urea Nitrogen 9      Creatinine 0.49 (*)     eGFR >90      Calcium 8.7     MAGNESIUM - Abnormal    Magnesium 1.51 (*)    CBC - Abnormal    WBC 9.5      nRBC 0.0      RBC 3.92 (*)     Hemoglobin 8.9 (*)     Hematocrit 30.2 (*)     MCV 77 (*)     MCH 22.7 (*)     MCHC 29.5 (*)     RDW 16.6 (*)     Platelets 230      MPV 9.9     BASIC METABOLIC PANEL - Abnormal    Glucose 93      Sodium 140      Potassium 3.2 (*)     Chloride 108 (*)     Bicarbonate 23      Anion Gap 12      Urea Nitrogen 16      Creatinine 0.66      eGFR >90      Calcium 8.5 (*)    MAGNESIUM - Abnormal    Magnesium 1.50 (*)    MAGNESIUM - Normal    Magnesium 1.70     SARS-COV-2 PCR, SYMPTOMATIC - Normal    Coronavirus 2019, PCR Not Detected      Narrative:     This assay has received FDA Emergency Use Authorization (EUA) and is only authorized for the duration of time that circumstances exist to justify the authorization of the emergency use of in vitro diagnostic tests for the detection of SARS-CoV-2 virus and/or diagnosis of COVID-19 infection under section 564(b)(1) of the Act, 21 U.S.C. 360bbb-3(b)(1). This assay is an in vitro diagnostic nucleic acid amplification test for the qualitative detection of SARS-CoV-2 from nasopharyngeal specimens and has been validated for use at [facility]. Negative results do not preclude COVID-19 infections and should not be used as the sole basis for diagnosis, treatment, or other management decisions.     RENAL FUNCTION PANEL - Normal    Glucose 92      Sodium 139      Potassium 3.5      Chloride 106      Bicarbonate 27      Anion Gap 10      Urea Nitrogen 17      Creatinine 0.69      eGFR >90      Calcium 8.6      Phosphorus 4.4      Albumin 3.4     PHOSPHORUS - Normal    Phosphorus 3.6     TROPONIN SERIES- (INITIAL, 1 HR)    Narrative:     The following orders were created for panel order Troponin I Series, High  Sensitivity (0, 1 HR).  Procedure                               Abnormality         Status                     ---------                               -----------         ------                     Troponin I, High Sensiti...[[id number]]  Abnormal            Final result               Troponin, High Sensitivi...[[id number]]  Abnormal            Final result                 Please view results for these tests on the individual orders.   GRAY TOP     Cardiac catheterization - coronary   Final Result      Transthoracic Echo (TTE) Complete   Final Result      XR chest 1 view   Final Result   No acute cardiopulmonary process.             MACRO:   None.        Signed by: Nate Borges 10/1/2023 12:27 AM   Dictation workstation:   [id number]      Cardiac catheterization - coronary    (Results Pending)           Procedure  Critical Care    Performed by: Flako Barbosa DO  Authorized by: Flako Barbosa DO    Critical care provider statement:     Critical care time (minutes):  30    Critical care was necessary to treat or prevent imminent or life-threatening deterioration of the following conditions: Myocardial infarction.    Critical care was time spent personally by me on the following activities:  Discussions with consultants, evaluation of patient's response to treatment, examination of patient, ordering and review of laboratory studies, ordering and review of radiographic studies and ordering and performing treatments and interventions    I assumed direction of critical care for this patient from another provider in my specialty: yes      Care discussed with: admitting provider         Flako Barbosa DO  10/01/23 0584       Flako Barbosa DO  10/03/23 1472

## 2023-10-01 NOTE — CARE PLAN
Problem: Fall/Injury  Goal: Not fall by end of shift  10/1/2023 0423 by Sammi Barrera RN  Outcome: Adequate for Discharge  10/1/2023 0423 by Sammi Barrera RN  Outcome: Progressing  Goal: Be free from injury by end of the shift  10/1/2023 0423 by Sammi Barrera RN  Outcome: Adequate for Discharge  10/1/2023 0423 by Sammi Barrera RN  Outcome: Progressing  Goal: Verbalize understanding of personal risk factors for fall in the hospital  10/1/2023 0423 by Sammi Barrera RN  Outcome: Adequate for Discharge  10/1/2023 0423 by Sammi Barrera RN  Outcome: Progressing  Goal: Verbalize understanding of risk factor reduction measures to prevent injury from fall in the home  10/1/2023 0423 by Sammi Barrera RN  Outcome: Adequate for Discharge  10/1/2023 0423 by Sammi Barrera RN  Outcome: Progressing  Goal: Use assistive devices by end of the shift  10/1/2023 0423 by Sammi Barrera RN  Outcome: Adequate for Discharge  10/1/2023 0423 by Sammi Barrera RN  Outcome: Progressing  Goal: Pace activities to prevent fatigue by end of the shift  10/1/2023 0423 by Sammi Barrera RN  Outcome: Adequate for Discharge  10/1/2023 0423 by Sammi Barrera RN  Outcome: Progressing   The patient's goals for the shift include

## 2023-10-01 NOTE — Clinical Note
Angioplasty of the proximal right coronary artery lesion. Inflation 1: Pressure = 20 megan; Duration = 10 sec. Inflation 2: Pressure = 22 megan; Duration = 15 sec. Inflation 3: Pressure = 22 megan; Duration = 10 sec.

## 2023-10-01 NOTE — CARE PLAN
The patient's goals for the shift include        Problem: Fall/Injury  Goal: Not fall by end of shift  10/1/2023 0423 by Sammi Barrera RN  Outcome: Adequate for Discharge  10/1/2023 0423 by Sammi Barrera RN  Outcome: Progressing  Goal: Be free from injury by end of the shift  10/1/2023 0423 by Sammi Barrera RN  Outcome: Adequate for Discharge  10/1/2023 0423 by Sammi Barrera RN  Outcome: Progressing  Goal: Verbalize understanding of personal risk factors for fall in the hospital  10/1/2023 0423 by Sammi Barrera RN  Outcome: Adequate for Discharge  10/1/2023 0423 by Sammi Barrera RN  Outcome: Progressing  Goal: Verbalize understanding of risk factor reduction measures to prevent injury from fall in the home  10/1/2023 0423 by Sammi Barrera RN  Outcome: Adequate for Discharge  10/1/2023 0423 by Sammi Barrera RN  Outcome: Progressing  Goal: Use assistive devices by end of the shift  10/1/2023 0423 by Sammi Barrera RN  Outcome: Adequate for Discharge  10/1/2023 0423 by Sammi Barrera RN  Outcome: Progressing  Goal: Pace activities to prevent fatigue by end of the shift  10/1/2023 0423 by Sammi Barrera RN  Outcome: Adequate for Discharge  10/1/2023 0423 by Sammi Barrera RN  Outcome: Progressing     Problem: Infection related to problem list condition  Goal: Infection will resolve through treatment  Outcome: Adequate for Discharge

## 2023-10-01 NOTE — Clinical Note
Vessel: circumflex (2nd marginal). Guidewire removed. The guidewire was removed due to: wire reshaping.

## 2023-10-01 NOTE — H&P (VIEW-ONLY)
Critical Care Medicine History and Physical        HPI:  Subjective   Patient is a 63 y.o. female admitted on 10/1/2023 12:05 AM  with chief complaint of with chief complaint of Chest pain.  Patient states that she has had a 3-day history of midsternal chest pain with no radiation that she states was constant and was an 8 out of 10 on admission.  She states that walking made it better but lying in bed made it worse.  She states that she would only get this chest pain at night.  She states that it got worse this evening with shortness of breath and decided to come to the emergency department.  On exam she reports chest pain and shortness of breath.  She denies any fevers or chills.  She denies any headache, dizziness, diplopia or diaphoresis.  She denies any cough.  She denies any nausea, vomiting, diarrhea, hematochezia, hematemesis, melena stools or abdominal pain.  She states that her appetite and weight are at her baseline.  She denies any urinary symptoms..    Past Medical History:   Diagnosis Date    Anxiety     GERD (gastroesophageal reflux disease)     Hyperlipemia     Hyperlipidemia     Hypertension        Past Surgical History:   Procedure Laterality Date    CORONARY ANGIOPLASTY WITH STENT PLACEMENT      US ASPIRATION INJECTION INTERMEDIATE JOINT  12/23/2019    US ASPIRATION INJECTION INTERMEDIATE JOINT 12/23/2019 [facility]    US GUIDED THYROID BIOPSY  12/13/2019    US GUIDED THYROID BIOPSY 12/13/2019 [facility]       Family History   Problem Relation Name Age of Onset    Heart attack Father         Social History     Tobacco Use    Smoking status: Every Day     Packs/day: 1.00     Years: 15.00     Additional pack years: 0.00     Total pack years: 15.00     Types: Cigarettes    Smokeless tobacco: Never   Vaping Use    Vaping Use: Never used   Substance Use Topics    Alcohol use: Never    Drug use: Never       Review of Systems:  14 point review of systems was completed and negative  except for those specially mention in my HPI    Patient has no known allergies.    Medications Prior to Admission   Medication Sig Dispense Refill Last Dose    bisoproloL-hydrochlorothiazide (Ziac) 10-6.25 mg tablet TAKE 1 TABLET DAILY 90 tablet 0     cholecalciferol (Vitamin D-3) 25 MCG (1000 UT) capsule Take 1 capsule (25 mcg) by mouth once daily.       fenofibrate (Triglide) 160 mg tablet TAKE 1 TABLET EVERY OTHER DAY 45 tablet 0     FLUoxetine (PROzac) 20 mg capsule TAKE 1 CAPSULE DAILY 90 capsule 0     omega-3 acid ethyl esters (Lovaza) 1 gram capsule TAKE 1 CAPSULE BY MOUTH TWICE A DAY 60 capsule 5     omeprazole OTC (PriLOSEC OTC) 20 mg EC tablet Take 1 tablet (20 mg) by mouth once daily in the morning. Take before meals. Do not crush, chew, or split.       valsartan (Diovan) 80 mg tablet TAKE 1 TABLET DAILY 90 tablet 3        Scheduled Medications:   aspirin, 81 mg, oral, Daily  atorvastatin, 80 mg, oral, Once  carvedilol, 6.25 mg, oral, BID  cholecalciferol, 25 mcg, oral, Daily  FLUoxetine, 20 mg, oral, Daily  icosapent ethyL, 2 g, oral, BID with meals  ondansetron, , ,   oxygen, , inhalation, Continuous - 02/gases  pantoprazole, 20 mg, oral, Daily before breakfast  perflutren lipid microspheres, 0.5 mL, intravenous, Once in imaging  perflutren protein A microsphere, 0.5 mL, intravenous, Once in imaging  sodium chloride, 500 mL, intravenous, Once  sulfur hexafluoride microsphr, 2 mL, intravenous, Once in imaging  ticagrelor, 90 mg, oral, BID         Continuous Medications:   sodium chloride 0.9%, 1.5 mL/kg/hr         PRN Medications:   PRN medications: nitroglycerin, ondansetron    Objective   Vitals:  Most Recent:  Vitals:    10/01/23 0432   BP: 151/63   Pulse: 62   Resp: 13   Temp: 36.6 °C (97.9 °F)   SpO2:        24hr Min/Max:  Temp  Min: 36.6 °C (97.9 °F)  Max: 36.6 °C (97.9 °F)  Pulse  Min: 62  Max: 85  BP  Min: 151/63  Max: 186/86  Resp  Min: 13  Max: 20  SpO2  Min: 97 %  Max: 97 %    LDA:           Vent settings:       Hemodynamic parameters for last 24 hours:         Intake/Output Summary (Last 24 hours) at 10/1/2023 0437  Last data filed at 10/1/2023 0300  Gross per 24 hour   Intake --   Output 5 ml   Net -5 ml       Physical exam:    Constitutional: Well-developed  female.  Patient is supine in bed with head   elevated.  Patient is not ill appearing.    Eyes: Conjunctiva is pink, sclerae shows no sign of infection or jaundice.  PERRLA.    ENMT Ears:  Nasal pharynx mucosa is pink and moist.  Posterior oropharynx is without   lesions or exudate.    Neck: Neck is supple, no apparent injury, thyroid without mass or tenderness, No JVD, Trachea is midline, No Bruits    Respiratory: Lungs are clear to auscultation all fields.  No wheezes, rhonchi or rales appreciated.  No   increased work of breathing.  No accessory muscle use.  No retractions noted.  Good chest expansion,   thorax symmetrical    Cardiovascular: Regular rate and rhythm noted.  S1 and S2 no rubs, clicks, or gallops   noted.  2+ equal peripheral pulses in all extremities.  No JVD Present    Gastrointestinal: Non-distended, No pain on palpation.  Bowel sounds are normal.   No hepatosplenomegaly noted.  Patient is negative for rebound tenderness in all quadrants. No Bruits    Genitourinary: NA    Musculoskeletal: No Weaknesses ROM intact.  Normal extremities, no cyanosis, edema, contusions, or wounds, or clubbing    Skin: Skin is warm and dry, no lesions, no rashes    Neurologic: Awake, Alert, and Oriented x3.  GCS 15, No Focal Deficits, Symmetric strength 5/5 in the upper and lower extremities bilaterally    Lymphatic:      Psychiatric: Mood and thoughts are appropriate.  No evidence of disordered thinking noted.      Lab/Radiology/Diagnostic Review:  Results for orders placed or performed during the hospital encounter of 10/01/23 (from the past 24 hour(s))   CBC and Auto Differential   Result Value Ref Range    WBC 9.9 4.4 - 11.3  x10*3/uL    nRBC 0.0 0.0 - 0.0 /100 WBCs    RBC 4.98 4.00 - 5.20 x10*6/uL    Hemoglobin 11.4 (L) 12.0 - 16.0 g/dL    Hematocrit 38.0 36.0 - 46.0 %    MCV 76 (L) 80 - 100 fL    MCH 22.9 (L) 26.0 - 34.0 pg    MCHC 30.0 (L) 32.0 - 36.0 g/dL    RDW 16.2 (H) 11.5 - 14.5 %    Platelets 331 150 - 450 x10*3/uL    MPV 9.9 7.5 - 11.5 fL    Neutrophils % 67.1 40.0 - 80.0 %    Immature Granulocytes %, Automated 0.7 0.0 - 0.9 %    Lymphocytes % 20.6 13.0 - 44.0 %    Monocytes % 6.9 2.0 - 10.0 %    Eosinophils % 3.9 0.0 - 6.0 %    Basophils % 0.8 0.0 - 2.0 %    Neutrophils Absolute 6.63 1.20 - 7.70 x10*3/uL    Immature Granulocytes Absolute, Automated 0.07 0.00 - 0.70 x10*3/uL    Lymphocytes Absolute 2.03 1.20 - 4.80 x10*3/uL    Monocytes Absolute 0.68 0.10 - 1.00 x10*3/uL    Eosinophils Absolute 0.38 0.00 - 0.70 x10*3/uL    Basophils Absolute 0.08 0.00 - 0.10 x10*3/uL   Comprehensive Metabolic Panel   Result Value Ref Range    Glucose 123 (H) 74 - 99 mg/dL    Sodium 139 136 - 145 mmol/L    Potassium 3.3 (L) 3.5 - 5.3 mmol/L    Chloride 102 98 - 107 mmol/L    Bicarbonate 25 21 - 32 mmol/L    Anion Gap 15 10 - 20 mmol/L    Urea Nitrogen 20 6 - 23 mg/dL    Creatinine 0.87 0.50 - 1.05 mg/dL    eGFR 74 >60 mL/min/1.73m*2    Calcium 9.8 8.6 - 10.3 mg/dL    Albumin 4.3 3.4 - 5.0 g/dL    Alkaline Phosphatase 50 33 - 136 U/L    Total Protein 8.3 (H) 6.4 - 8.2 g/dL    AST 28 9 - 39 U/L    Bilirubin, Total 0.3 0.0 - 1.2 mg/dL    ALT 22 7 - 45 U/L   Magnesium   Result Value Ref Range    Magnesium 1.70 1.60 - 2.40 mg/dL   D-dimer, quantitative   Result Value Ref Range    D-Dimer Non VTE, Quant (ng/mL FEU) 690 (H) <=500 ng/mL FEU   Troponin I, High Sensitivity, Initial   Result Value Ref Range    Troponin I, High Sensitivity 210 (HH) 0 - 13 ng/L     XR chest 1 view    Result Date: 10/1/2023  Interpreted By:  Nate Borges, STUDY: XR CHEST 1 VIEW;  10/1/2023 12:16 am   INDICATION: Chest pain..   COMPARISON: 10/07/2012   ACCESSION  NUMBER(S): [id number]   ORDERING CLINICIAN: ROME ALMEIDA   FINDINGS:     The cardiomediastinal silhouette and pulmonary vasculature are within normal limits. No consolidation, pleural effusion or pneumothorax.         No acute cardiopulmonary process.     MACRO: None.   Signed by: Nate Borges 10/1/2023 12:27 AM Dictation workstation:   [id number]      Additional Labs:      Assessment /Plan    Assessment:  Patient is a 63 y.o. female admitted on 10/1/2023 12:05 AM  with chief complaint of with chief complaint of Chest pain.  Patient states that she has had a 3-day history of midsternal chest pain with no radiation that she states was constant and was an 8 out of 10 on admission.  She states that walking made it better but lying in bed made it worse.  She states that she would only get this chest pain at night.  She states that it got worse this evening with shortness of breath and decided to come to the emergency department.     Impression:  STEMI with intervention  HTN  HLD  GERD  Anxiety    Plan:  #STEMI  Admit to ICU  Continuous Telemetry  Vitals per ICU Protocol  Weight on Admission  Strict I&O  Cardiac Diet  Monitor for SIRS criteria  Obtain blood cultures x 2 for temp 38C or greater  Consult Cardiology and appreciate recommendations  ECHO in AM  CBC, CMP, Coagulation Panel, Troponin in AM  HgbA1C, Lipid Panel, TSH in am  12 Lead in AM  IVF - NS @ 100ml/hr  Start Atorvastatin 80mg PO daily  Start Brilinta 90mg BID 12 hours after bolus dose  Start 81mg ASA Daily  Start Coreg 6.25mg Daily  Hold Home Omega 3    #Hypokalemia  On admission: 3.3  Etiology: likely from ETOH abuse and not eating  Check Magnesium level  BMP in am  Potassium replacement of 40mEq liquid.  #HTN  Hold home Bisoprolol-HCTZ  Hold home Valsartan    #HLD  Continue home Atorvastatin  Continue home Fenofibrate    #GERD  Continue home Omeprazole    #Anxiety  Continue home Paxil    #Misc. Medications  Continue home Vitamin D3    Fluids: NS  @ 100ml/hr  Electrolytes: monitor and correct abnormalities as indicated  Nutrition: NPO  GI prophylaxis:   DVT prophylaxis: SCD,   Antimicrobials:   Oxygen: Maintain >92% SPO2,   Analgesia:   Sedation:   Glycemic control: None  Bowel care: As needed  Indwelling catheter: None  Lines: Peripheral IVs,     Code Status: Full Code    Patient was discussed with Dr. Gharibeh who agreed with the assessment and plan.    Disposition:  Admit to ICU    Pavel Mcpherson DNP, APRN, [facility]  Critical Care Nurse Practitioner  Ext. [phone]  [facility]    *Of note, this documentation is completed using the Dragon Dictation system (voice recognition software). There may be spelling and/or grammatical errors that were not corrected prior to final submission. *      I spent 50 minutes in the professional and overall care of this patient.

## 2023-10-01 NOTE — Clinical Note
Multiple views of the right coronary artery obtained using power injection. Rate = 4 mL/sec. Total volume = 8 mL. Post angio

## 2023-10-01 NOTE — NURSING NOTE
Patient arrived into room 804 via wheelchair, ambulated to bed without incidence and stand-by assistance. Connected to monitors. Pt denies any pain or needs, resting supine in bed. ECHO being performed now in room.

## 2023-10-01 NOTE — Clinical Note
Angioplasty of the proximal right coronary artery lesion. Inflation 1: Pressure = 20 megan; Duration = 15 sec. Inflation 2: Pressure = 16 megan; Duration = 8 sec. Inflation 3: Pressure = 12 megan; Duration = 6 sec.

## 2023-10-02 ENCOUNTER — APPOINTMENT (OUTPATIENT)
Dept: CARDIOLOGY | Facility: HOSPITAL | Age: 63
DRG: 321 | End: 2023-10-02
Payer: COMMERCIAL

## 2023-10-02 LAB
ANION GAP SERPL CALC-SCNC: 11 MMOL/L (ref 10–20)
ATRIAL RATE: 78 BPM
BASOPHILS # BLD AUTO: 0.04 X10*3/UL (ref 0–0.1)
BASOPHILS NFR BLD AUTO: 0.5 %
BUN SERPL-MCNC: 9 MG/DL (ref 6–23)
CALCIUM SERPL-MCNC: 8.7 MG/DL (ref 8.6–10.3)
CARDIAC TROPONIN I PNL SERPL HS: 1214 NG/L (ref 0–13)
CARDIAC TROPONIN I PNL SERPL HS: 837 NG/L (ref 0–13)
CHLORIDE SERPL-SCNC: 108 MMOL/L (ref 98–107)
CO2 SERPL-SCNC: 24 MMOL/L (ref 21–32)
CREAT SERPL-MCNC: 0.49 MG/DL (ref 0.5–1.05)
EOSINOPHIL # BLD AUTO: 0.31 X10*3/UL (ref 0–0.7)
EOSINOPHIL NFR BLD AUTO: 3.6 %
ERYTHROCYTE [DISTWIDTH] IN BLOOD BY AUTOMATED COUNT: 16.3 % (ref 11.5–14.5)
GFR SERPL CREATININE-BSD FRML MDRD: >90 ML/MIN/1.73M*2
GLUCOSE SERPL-MCNC: 98 MG/DL (ref 74–99)
HCT VFR BLD AUTO: 31.5 % (ref 36–46)
HGB BLD-MCNC: 9.2 G/DL (ref 12–16)
IMM GRANULOCYTES # BLD AUTO: 0.03 X10*3/UL (ref 0–0.7)
IMM GRANULOCYTES NFR BLD AUTO: 0.3 % (ref 0–0.9)
LYMPHOCYTES # BLD AUTO: 1.15 X10*3/UL (ref 1.2–4.8)
LYMPHOCYTES NFR BLD AUTO: 13.3 %
MAGNESIUM SERPL-MCNC: 1.51 MG/DL (ref 1.6–2.4)
MCH RBC QN AUTO: 22.3 PG (ref 26–34)
MCHC RBC AUTO-ENTMCNC: 29.2 G/DL (ref 32–36)
MCV RBC AUTO: 76 FL (ref 80–100)
MONOCYTES # BLD AUTO: 0.53 X10*3/UL (ref 0.1–1)
MONOCYTES NFR BLD AUTO: 6.1 %
NEUTROPHILS # BLD AUTO: 6.56 X10*3/UL (ref 1.2–7.7)
NEUTROPHILS NFR BLD AUTO: 76.2 %
NRBC BLD-RTO: 0 /100 WBCS (ref 0–0)
P AXIS: 50 DEGREES
P OFFSET: 196 MS
P ONSET: 146 MS
PHOSPHATE SERPL-MCNC: 3.6 MG/DL (ref 2.5–4.9)
PLATELET # BLD AUTO: 232 X10*3/UL (ref 150–450)
PMV BLD AUTO: 9.6 FL (ref 7.5–11.5)
POTASSIUM SERPL-SCNC: 3.3 MMOL/L (ref 3.5–5.3)
PR INTERVAL: 134 MS
Q ONSET: 213 MS
QRS COUNT: 13 BEATS
QRS DURATION: 88 MS
QT INTERVAL: 410 MS
QTC CALCULATION(BAZETT): 467 MS
QTC FREDERICIA: 447 MS
R AXIS: -61 DEGREES
RBC # BLD AUTO: 4.13 X10*6/UL (ref 4–5.2)
SODIUM SERPL-SCNC: 140 MMOL/L (ref 136–145)
T AXIS: 83 DEGREES
T OFFSET: 418 MS
VENTRICULAR RATE: 78 BPM
WBC # BLD AUTO: 8.6 X10*3/UL (ref 4.4–11.3)

## 2023-10-02 PROCEDURE — C1874 STENT, COATED/COV W/DEL SYS: HCPCS | Performed by: STUDENT IN AN ORGANIZED HEALTH CARE EDUCATION/TRAINING PROGRAM

## 2023-10-02 PROCEDURE — 93454 CORONARY ARTERY ANGIO S&I: CPT | Performed by: STUDENT IN AN ORGANIZED HEALTH CARE EDUCATION/TRAINING PROGRAM

## 2023-10-02 PROCEDURE — 2500000004 HC RX 250 GENERAL PHARMACY W/ HCPCS (ALT 636 FOR OP/ED): Performed by: STUDENT IN AN ORGANIZED HEALTH CARE EDUCATION/TRAINING PROGRAM

## 2023-10-02 PROCEDURE — 93005 ELECTROCARDIOGRAM TRACING: CPT

## 2023-10-02 PROCEDURE — 99291 CRITICAL CARE FIRST HOUR: CPT

## 2023-10-02 PROCEDURE — 80048 BASIC METABOLIC PNL TOTAL CA: CPT

## 2023-10-02 PROCEDURE — 99152 MOD SED SAME PHYS/QHP 5/>YRS: CPT | Performed by: STUDENT IN AN ORGANIZED HEALTH CARE EDUCATION/TRAINING PROGRAM

## 2023-10-02 PROCEDURE — 99233 SBSQ HOSP IP/OBS HIGH 50: CPT | Performed by: INTERNAL MEDICINE

## 2023-10-02 PROCEDURE — 2500000001 HC RX 250 WO HCPCS SELF ADMINISTERED DRUGS (ALT 637 FOR MEDICARE OP): Performed by: INTERNAL MEDICINE

## 2023-10-02 PROCEDURE — 2500000001 HC RX 250 WO HCPCS SELF ADMINISTERED DRUGS (ALT 637 FOR MEDICARE OP): Performed by: NURSE PRACTITIONER

## 2023-10-02 PROCEDURE — 92929 PR PRQ TRLUML CORONARY STENT W/ANGIO ADDL ART/BRNCH: CPT | Performed by: STUDENT IN AN ORGANIZED HEALTH CARE EDUCATION/TRAINING PROGRAM

## 2023-10-02 PROCEDURE — 92978 ENDOLUMINL IVUS OCT C 1ST: CPT | Performed by: STUDENT IN AN ORGANIZED HEALTH CARE EDUCATION/TRAINING PROGRAM

## 2023-10-02 PROCEDURE — 7100000010 HC PHASE TWO TIME - EACH INCREMENTAL 1 MINUTE: Performed by: STUDENT IN AN ORGANIZED HEALTH CARE EDUCATION/TRAINING PROGRAM

## 2023-10-02 PROCEDURE — 84484 ASSAY OF TROPONIN QUANT: CPT | Performed by: STUDENT IN AN ORGANIZED HEALTH CARE EDUCATION/TRAINING PROGRAM

## 2023-10-02 PROCEDURE — 7100000009 HC PHASE TWO TIME - INITIAL BASE CHARGE: Performed by: STUDENT IN AN ORGANIZED HEALTH CARE EDUCATION/TRAINING PROGRAM

## 2023-10-02 PROCEDURE — C1887 CATHETER, GUIDING: HCPCS | Performed by: STUDENT IN AN ORGANIZED HEALTH CARE EDUCATION/TRAINING PROGRAM

## 2023-10-02 PROCEDURE — 85347 COAGULATION TIME ACTIVATED: CPT | Performed by: STUDENT IN AN ORGANIZED HEALTH CARE EDUCATION/TRAINING PROGRAM

## 2023-10-02 PROCEDURE — 2500000004 HC RX 250 GENERAL PHARMACY W/ HCPCS (ALT 636 FOR OP/ED)

## 2023-10-02 PROCEDURE — 2500000002 HC RX 250 W HCPCS SELF ADMINISTERED DRUGS (ALT 637 FOR MEDICARE OP, ALT 636 FOR OP/ED): Performed by: NURSE PRACTITIONER

## 2023-10-02 PROCEDURE — 2500000005 HC RX 250 GENERAL PHARMACY W/O HCPCS: Performed by: STUDENT IN AN ORGANIZED HEALTH CARE EDUCATION/TRAINING PROGRAM

## 2023-10-02 PROCEDURE — 83735 ASSAY OF MAGNESIUM: CPT

## 2023-10-02 PROCEDURE — 2500000001 HC RX 250 WO HCPCS SELF ADMINISTERED DRUGS (ALT 637 FOR MEDICARE OP): Performed by: STUDENT IN AN ORGANIZED HEALTH CARE EDUCATION/TRAINING PROGRAM

## 2023-10-02 PROCEDURE — C9600 PERC DRUG-EL COR STENT SING: HCPCS | Performed by: STUDENT IN AN ORGANIZED HEALTH CARE EDUCATION/TRAINING PROGRAM

## 2023-10-02 PROCEDURE — 2500000001 HC RX 250 WO HCPCS SELF ADMINISTERED DRUGS (ALT 637 FOR MEDICARE OP)

## 2023-10-02 PROCEDURE — 2500000004 HC RX 250 GENERAL PHARMACY W/ HCPCS (ALT 636 FOR OP/ED): Performed by: NURSE PRACTITIONER

## 2023-10-02 PROCEDURE — C1769 GUIDE WIRE: HCPCS | Performed by: STUDENT IN AN ORGANIZED HEALTH CARE EDUCATION/TRAINING PROGRAM

## 2023-10-02 PROCEDURE — 027036Z DILATION OF CORONARY ARTERY, ONE ARTERY WITH THREE DRUG-ELUTING INTRALUMINAL DEVICES, PERCUTANEOUS APPROACH: ICD-10-PCS | Performed by: STUDENT IN AN ORGANIZED HEALTH CARE EDUCATION/TRAINING PROGRAM

## 2023-10-02 PROCEDURE — C1725 CATH, TRANSLUMIN NON-LASER: HCPCS | Performed by: STUDENT IN AN ORGANIZED HEALTH CARE EDUCATION/TRAINING PROGRAM

## 2023-10-02 PROCEDURE — 99153 MOD SED SAME PHYS/QHP EA: CPT | Performed by: STUDENT IN AN ORGANIZED HEALTH CARE EDUCATION/TRAINING PROGRAM

## 2023-10-02 PROCEDURE — C1753 CATH, INTRAVAS ULTRASOUND: HCPCS | Performed by: STUDENT IN AN ORGANIZED HEALTH CARE EDUCATION/TRAINING PROGRAM

## 2023-10-02 PROCEDURE — 93010 ELECTROCARDIOGRAM REPORT: CPT | Performed by: INTERNAL MEDICINE

## 2023-10-02 PROCEDURE — 36415 COLL VENOUS BLD VENIPUNCTURE: CPT | Performed by: STUDENT IN AN ORGANIZED HEALTH CARE EDUCATION/TRAINING PROGRAM

## 2023-10-02 PROCEDURE — 85025 COMPLETE CBC W/AUTO DIFF WBC: CPT

## 2023-10-02 PROCEDURE — S4991 NICOTINE PATCH NONLEGEND: HCPCS | Performed by: NURSE PRACTITIONER

## 2023-10-02 PROCEDURE — 84100 ASSAY OF PHOSPHORUS: CPT

## 2023-10-02 PROCEDURE — 2780000003 HC OR 278 NO HCPCS: Performed by: STUDENT IN AN ORGANIZED HEALTH CARE EDUCATION/TRAINING PROGRAM

## 2023-10-02 PROCEDURE — 1200000002 HC GENERAL ROOM WITH TELEMETRY DAILY

## 2023-10-02 PROCEDURE — C1894 INTRO/SHEATH, NON-LASER: HCPCS | Performed by: STUDENT IN AN ORGANIZED HEALTH CARE EDUCATION/TRAINING PROGRAM

## 2023-10-02 PROCEDURE — 2720000007 HC OR 272 NO HCPCS: Performed by: STUDENT IN AN ORGANIZED HEALTH CARE EDUCATION/TRAINING PROGRAM

## 2023-10-02 PROCEDURE — 36415 COLL VENOUS BLD VENIPUNCTURE: CPT

## 2023-10-02 DEVICE — STENT ONYXNG30022UX ONYX 3.00X22RX
Type: IMPLANTABLE DEVICE | Status: FUNCTIONAL
Brand: ONYX FRONTIER™

## 2023-10-02 DEVICE — STENT ONYXNG30008UX ONYX 3.00X08RX
Type: IMPLANTABLE DEVICE | Status: FUNCTIONAL
Brand: ONYX FRONTIER™

## 2023-10-02 DEVICE — STENT ONYXNG30015UX ONYX 3.00X15RX
Type: IMPLANTABLE DEVICE | Status: FUNCTIONAL
Brand: ONYX FRONTIER™

## 2023-10-02 RX ORDER — FENTANYL CITRATE 50 UG/ML
INJECTION, SOLUTION INTRAMUSCULAR; INTRAVENOUS AS NEEDED
Status: DISCONTINUED | OUTPATIENT
Start: 2023-10-02 | End: 2023-10-02 | Stop reason: HOSPADM

## 2023-10-02 RX ORDER — HYDROCHLOROTHIAZIDE 25 MG/1
12.5 TABLET ORAL DAILY
Status: DISCONTINUED | OUTPATIENT
Start: 2023-10-03 | End: 2023-10-03 | Stop reason: HOSPADM

## 2023-10-02 RX ORDER — MIDAZOLAM HYDROCHLORIDE 1 MG/ML
INJECTION INTRAMUSCULAR; INTRAVENOUS AS NEEDED
Status: DISCONTINUED | OUTPATIENT
Start: 2023-10-02 | End: 2023-10-02 | Stop reason: HOSPADM

## 2023-10-02 RX ORDER — LOSARTAN POTASSIUM 100 MG/1
100 TABLET ORAL DAILY
Status: DISCONTINUED | OUTPATIENT
Start: 2023-10-03 | End: 2023-10-03 | Stop reason: HOSPADM

## 2023-10-02 RX ORDER — LIDOCAINE HYDROCHLORIDE 20 MG/ML
INJECTION, SOLUTION INFILTRATION; PERINEURAL AS NEEDED
Status: DISCONTINUED | OUTPATIENT
Start: 2023-10-02 | End: 2023-10-02 | Stop reason: HOSPADM

## 2023-10-02 RX ORDER — CARVEDILOL 12.5 MG/1
25 TABLET ORAL 2 TIMES DAILY
Status: DISCONTINUED | OUTPATIENT
Start: 2023-10-02 | End: 2023-10-03 | Stop reason: HOSPADM

## 2023-10-02 RX ORDER — HEPARIN SODIUM 1000 [USP'U]/ML
INJECTION, SOLUTION INTRAVENOUS; SUBCUTANEOUS AS NEEDED
Status: DISCONTINUED | OUTPATIENT
Start: 2023-10-02 | End: 2023-10-02 | Stop reason: HOSPADM

## 2023-10-02 RX ORDER — SODIUM CHLORIDE 9 MG/ML
100 INJECTION, SOLUTION INTRAVENOUS CONTINUOUS
Status: DISCONTINUED | OUTPATIENT
Start: 2023-10-02 | End: 2023-10-02

## 2023-10-02 RX ORDER — HYDRALAZINE HYDROCHLORIDE 20 MG/ML
INJECTION INTRAMUSCULAR; INTRAVENOUS AS NEEDED
Status: DISCONTINUED | OUTPATIENT
Start: 2023-10-02 | End: 2023-10-02 | Stop reason: HOSPADM

## 2023-10-02 RX ORDER — HYDRALAZINE HYDROCHLORIDE 20 MG/ML
10 INJECTION INTRAMUSCULAR; INTRAVENOUS ONCE
Status: COMPLETED | OUTPATIENT
Start: 2023-10-02 | End: 2023-10-02

## 2023-10-02 RX ORDER — LABETALOL HYDROCHLORIDE 5 MG/ML
INJECTION, SOLUTION INTRAVENOUS AS NEEDED
Status: DISCONTINUED | OUTPATIENT
Start: 2023-10-02 | End: 2023-10-02 | Stop reason: HOSPADM

## 2023-10-02 RX ORDER — SODIUM CHLORIDE 9 MG/ML
1.5 INJECTION, SOLUTION INTRAVENOUS CONTINUOUS
Status: ACTIVE | OUTPATIENT
Start: 2023-10-02 | End: 2023-10-03

## 2023-10-02 RX ADMIN — TICAGRELOR 90 MG: 90 TABLET ORAL at 12:19

## 2023-10-02 RX ADMIN — NICOTINE 1 PATCH: 21 PATCH, EXTENDED RELEASE TRANSDERMAL at 08:10

## 2023-10-02 RX ADMIN — HYDRALAZINE HYDROCHLORIDE 10 MG: 20 INJECTION INTRAMUSCULAR; INTRAVENOUS at 14:32

## 2023-10-02 RX ADMIN — SODIUM CHLORIDE 1.5 ML/KG/HR: 9 INJECTION, SOLUTION INTRAVENOUS at 22:21

## 2023-10-02 RX ADMIN — FLUOXETINE HYDROCHLORIDE 20 MG: 20 CAPSULE ORAL at 08:03

## 2023-10-02 RX ADMIN — PANTOPRAZOLE SODIUM 20 MG: 20 TABLET, DELAYED RELEASE ORAL at 08:04

## 2023-10-02 RX ADMIN — Medication 5 MG: at 21:35

## 2023-10-02 RX ADMIN — CARVEDILOL 25 MG: 12.5 TABLET, FILM COATED ORAL at 21:35

## 2023-10-02 RX ADMIN — Medication 25 MCG: at 08:04

## 2023-10-02 RX ADMIN — ICOSAPENT ETHYL 2 G: 1 CAPSULE ORAL at 08:04

## 2023-10-02 RX ADMIN — LOSARTAN POTASSIUM 50 MG: 50 TABLET, FILM COATED ORAL at 08:04

## 2023-10-02 RX ADMIN — CARVEDILOL 12.5 MG: 12.5 TABLET, FILM COATED ORAL at 08:03

## 2023-10-02 RX ADMIN — HYDRALAZINE HYDROCHLORIDE 10 MG: 20 INJECTION INTRAMUSCULAR; INTRAVENOUS at 05:20

## 2023-10-02 RX ADMIN — HYDRALAZINE HYDROCHLORIDE 10 MG: 20 INJECTION INTRAMUSCULAR; INTRAVENOUS at 00:38

## 2023-10-02 RX ADMIN — HYDRALAZINE HYDROCHLORIDE 10 MG: 20 INJECTION INTRAMUSCULAR; INTRAVENOUS at 12:15

## 2023-10-02 RX ADMIN — HYDROXYZINE HYDROCHLORIDE 25 MG: 25 TABLET ORAL at 14:29

## 2023-10-02 RX ADMIN — SODIUM CHLORIDE 125 ML/HR: 9 INJECTION, SOLUTION INTRAVENOUS at 00:22

## 2023-10-02 SDOH — ECONOMIC STABILITY: HOUSING INSECURITY: IN THE LAST 12 MONTHS, HOW MANY PLACES HAVE YOU LIVED?: 1

## 2023-10-02 SDOH — SOCIAL STABILITY: SOCIAL INSECURITY: WITHIN THE LAST YEAR, HAVE YOU BEEN HUMILIATED OR EMOTIONALLY ABUSED IN OTHER WAYS BY YOUR PARTNER OR EX-PARTNER?: NO

## 2023-10-02 SDOH — HEALTH STABILITY: PHYSICAL HEALTH: ON AVERAGE, HOW MANY MINUTES DO YOU ENGAGE IN EXERCISE AT THIS LEVEL?: 30 MIN

## 2023-10-02 SDOH — ECONOMIC STABILITY: INCOME INSECURITY: IN THE PAST 12 MONTHS, HAS THE ELECTRIC, GAS, OIL, OR WATER COMPANY THREATENED TO SHUT OFF SERVICE IN YOUR HOME?: NO

## 2023-10-02 SDOH — HEALTH STABILITY: MENTAL HEALTH: HOW OFTEN DO YOU HAVE A DRINK CONTAINING ALCOHOL?: NEVER

## 2023-10-02 SDOH — SOCIAL STABILITY: SOCIAL INSECURITY: WITHIN THE LAST YEAR, HAVE YOU BEEN AFRAID OF YOUR PARTNER OR EX-PARTNER?: NO

## 2023-10-02 SDOH — SOCIAL STABILITY: SOCIAL INSECURITY
WITHIN THE LAST YEAR, HAVE YOU BEEN KICKED, HIT, SLAPPED, OR OTHERWISE PHYSICALLY HURT BY YOUR PARTNER OR EX-PARTNER?: NO

## 2023-10-02 SDOH — ECONOMIC STABILITY: HOUSING INSECURITY
IN THE LAST 12 MONTHS, WAS THERE A TIME WHEN YOU DID NOT HAVE A STEADY PLACE TO SLEEP OR SLEPT IN A SHELTER (INCLUDING NOW)?: NO

## 2023-10-02 SDOH — ECONOMIC STABILITY: TRANSPORTATION INSECURITY
IN THE PAST 12 MONTHS, HAS LACK OF TRANSPORTATION KEPT YOU FROM MEETINGS, WORK, OR FROM GETTING THINGS NEEDED FOR DAILY LIVING?: NO

## 2023-10-02 SDOH — SOCIAL STABILITY: SOCIAL NETWORK: HOW OFTEN DO YOU ATTEND CHURCH OR RELIGIOUS SERVICES?: NEVER

## 2023-10-02 SDOH — SOCIAL STABILITY: SOCIAL NETWORK: ARE YOU MARRIED, WIDOWED, DIVORCED, SEPARATED, NEVER MARRIED, OR LIVING WITH A PARTNER?: MARRIED

## 2023-10-02 SDOH — SOCIAL STABILITY: SOCIAL NETWORK
IN A TYPICAL WEEK, HOW MANY TIMES DO YOU TALK ON THE PHONE WITH FAMILY, FRIENDS, OR NEIGHBORS?: MORE THAN THREE TIMES A WEEK

## 2023-10-02 SDOH — SOCIAL STABILITY: SOCIAL NETWORK
DO YOU BELONG TO ANY CLUBS OR ORGANIZATIONS SUCH AS CHURCH GROUPS UNIONS, FRATERNAL OR ATHLETIC GROUPS, OR SCHOOL GROUPS?: NO

## 2023-10-02 SDOH — ECONOMIC STABILITY: FOOD INSECURITY: WITHIN THE PAST 12 MONTHS, YOU WORRIED THAT YOUR FOOD WOULD RUN OUT BEFORE YOU GOT MONEY TO BUY MORE.: NEVER TRUE

## 2023-10-02 SDOH — ECONOMIC STABILITY: INCOME INSECURITY: IN THE LAST 12 MONTHS, WAS THERE A TIME WHEN YOU WERE NOT ABLE TO PAY THE MORTGAGE OR RENT ON TIME?: NO

## 2023-10-02 SDOH — SOCIAL STABILITY: SOCIAL INSECURITY
WITHIN THE LAST YEAR, HAVE TO BEEN RAPED OR FORCED TO HAVE ANY KIND OF SEXUAL ACTIVITY BY YOUR PARTNER OR EX-PARTNER?: NO

## 2023-10-02 SDOH — ECONOMIC STABILITY: FOOD INSECURITY: WITHIN THE PAST 12 MONTHS, THE FOOD YOU BOUGHT JUST DIDN'T LAST AND YOU DIDN'T HAVE MONEY TO GET MORE.: NEVER TRUE

## 2023-10-02 SDOH — HEALTH STABILITY: MENTAL HEALTH
STRESS IS WHEN SOMEONE FEELS TENSE, NERVOUS, ANXIOUS, OR CAN'T SLEEP AT NIGHT BECAUSE THEIR MIND IS TROUBLED. HOW STRESSED ARE YOU?: TO SOME EXTENT

## 2023-10-02 SDOH — HEALTH STABILITY: MENTAL HEALTH: HOW MANY STANDARD DRINKS CONTAINING ALCOHOL DO YOU HAVE ON A TYPICAL DAY?: PATIENT DOES NOT DRINK

## 2023-10-02 SDOH — ECONOMIC STABILITY: TRANSPORTATION INSECURITY
IN THE PAST 12 MONTHS, HAS THE LACK OF TRANSPORTATION KEPT YOU FROM MEDICAL APPOINTMENTS OR FROM GETTING MEDICATIONS?: NO

## 2023-10-02 SDOH — HEALTH STABILITY: MENTAL HEALTH: HOW OFTEN DO YOU HAVE 6 OR MORE DRINKS ON ONE OCCASION?: NEVER

## 2023-10-02 SDOH — SOCIAL STABILITY: SOCIAL NETWORK: HOW OFTEN DO YOU ATTENT MEETINGS OF THE CLUB OR ORGANIZATION YOU BELONG TO?: NEVER

## 2023-10-02 SDOH — HEALTH STABILITY: PHYSICAL HEALTH: ON AVERAGE, HOW MANY DAYS PER WEEK DO YOU ENGAGE IN MODERATE TO STRENUOUS EXERCISE (LIKE A BRISK WALK)?: 5 DAYS

## 2023-10-02 SDOH — SOCIAL STABILITY: SOCIAL NETWORK: HOW OFTEN DO YOU GET TOGETHER WITH FRIENDS OR RELATIVES?: ONCE A WEEK

## 2023-10-02 SDOH — ECONOMIC STABILITY: INCOME INSECURITY: HOW HARD IS IT FOR YOU TO PAY FOR THE VERY BASICS LIKE FOOD, HOUSING, MEDICAL CARE, AND HEATING?: NOT HARD AT ALL

## 2023-10-02 ASSESSMENT — PAIN - FUNCTIONAL ASSESSMENT
PAIN_FUNCTIONAL_ASSESSMENT: 0-10

## 2023-10-02 ASSESSMENT — PAIN SCALES - GENERAL
PAINLEVEL_OUTOF10: 0 - NO PAIN

## 2023-10-02 ASSESSMENT — LIFESTYLE VARIABLES
AUDIT-C TOTAL SCORE: 0
SKIP TO QUESTIONS 9-10: 1

## 2023-10-02 NOTE — PROGRESS NOTES
Subjective Data:  Patient sitting up in bed stable.  Denies any chest pain shortness of breath or other complaints.  Tolerating medication.  Remaining in sinus rhythm.  Vital signs are display hypertension.  Patient awaiting follow-up catheterization and angioplasty today with Dr. Mendes.    Overnight Events:    No specific overnight events of concern.     Objective Data:  Last Recorded Vitals:  Vitals:    10/02/23 0700 10/02/23 0800 10/02/23 0900 10/02/23 1000   BP: (!) 183/77 173/74 178/86 146/67   BP Location:       Patient Position:       Pulse: 66 66 74 68   Resp: 18 18 20 22   Temp:       TempSrc:       SpO2: 97% 97% 97%    Weight:       Height:           Last Labs:  CBC - 10/2/2023:  6:39 AM  8.6 9.2 232    31.5      CMP - 10/2/2023:  6:39 AM  8.7 8.3 28 --- 0.3   3.6 3.4 22 50      PTT - 10/1/2023:  4:37 AM  1.1   12.5 51     TROPHS   Date/Time Value Ref Range Status   10/02/2023 08:05  0 - 13 ng/L Final   10/02/2023 12:27 AM 1,214 0 - 13 ng/L Final     Comment:     Previous result verified on 10/1/2023 0924 on specimen/case [id number] called with component [facility] for procedure Troponin I, High Sensitivity Every 8 hours with value 1,105 ng/L.   10/01/2023 03:50 PM 1,609 0 - 13 ng/L Final     Comment:     Previous result verified on 10/1/2023 0924 on specimen/case [id number] called with component [facility] for procedure Troponin I, High Sensitivity Every 8 hours with value 1,105 ng/L.     HGBA1C   Date/Time Value Ref Range Status   10/01/2023 04:37 AM 6.2 see below % Final   08/08/2022 02:49 PM 6.0 % Final     Comment:          Diagnosis of Diabetes-Adults   Non-Diabetic: < or = 5.6%   Increased risk for developing diabetes: 5.7-6.4%   Diagnostic of diabetes: > or = 6.5%  .       Monitoring of Diabetes                Age (y)     Therapeutic Goal (%)   Adults:          >18           <7.0   Pediatrics:    13-18           <7.5                   7-12           <8.0                   0- 6             7.5-8.5   American Diabetes Association. Diabetes Care 33(S1), Jan 2010.       LDLCALC   Date/Time Value Ref Range Status   10/01/2023 04:37 AM 92 140 - 190 mg/dL Final     Comment:                                 Near   Borderline      AGE      Desirable  Optimal    High     High     Very High     0-19 Y     0 - 109     ---    110-129   >/= 130     ----    20-24 Y     0 - 119     ---    120-159   >/= 160     ----      >24 Y     0 -  99   100-129  130-159   160-189     >/=190       VLDL   Date/Time Value Ref Range Status   10/01/2023 04:37 AM 38 0 - 40 mg/dL Final   06/20/2022 11:56 AM 39 0 - 40 mg/dL Final   06/02/2021 03:04 PM 44 0 - 40 mg/dL Final   02/16/2021 01:35 PM 48 0 - 40 mg/dL Final      Last I/O:  I/O last 3 completed shifts:  In: 1727 (22.7 mL/kg) [I.V.:1727 (22.7 mL/kg)]  Out: 5 (0.1 mL/kg) [Blood:5]  Weight: 76.2 kg     Past Cardiology Tests (Last 3 Years):  EKG:  Normal sinus rhythm with nonspecific ST-T wave changes     Interpreting Physicians  Performing Staff   Charleen Hogan MD No performing staff assigned to study.     Indications  Priority: Routine  Post PCI   Comments: Post PCI on arrival to recovery area.     Interpretation Summary    Normal sinus rhythm  Left anterior fascicular block  Nonspecific ST abnormality  Abnormal ECG  When compared with ECG of 01-OCT-2023 06:34,  No significant change was found  Confirmed by Charleen Hogan ([id number]) on 10/2/2023 8:57:04 AM  Measurements    P Axis 50 degrees         P Offset 196 ms         NJ Interval 134 ms         Q Onset 213 ms         QTC Calculation(Bazett) 467 ms         QTC Fredericia 447 ms         R Axis -61 degrees                     Echo:  Height: 1.53 m   Weight: 79.8 kg   Blood Pressure: 171/70    Date of Study: 10/1/23   Ordering Provider: Nish Parnell MD   Clinical Indications: None Listed         Interpreting Physicians  Performing Staff   Norman Franklin DO Tech: Lata Wilson, RT        Indications  Priority:  Routine  Dx: STEMI (ST elevation myocardial infarction) (CMS/[facility]) [I21.3 (ICD-10-CM)]     PACS Images     Show images for Transthoracic Echo (TTE) Complete  Interpretation Summary              [place] [id number]   Tel [phone] Fax [phone]     TRANSTHORACIC ECHOCARDIOGRAM REPORT        Patient Name:      EH ONTIVEROS        Reading Physician:    [id number] Norman Franklin DO  Study Date:        10/1/2023            Ordering Provider:    [id number] BENOIT  MRN/PID:           [id number]             Fellow:  Accession#:        [id number]         Nurse:  Date of Birth/Age: 1960 / 63 years Sonographer:          Lata Wilson RDCS  Gender:            F                    Additional Staff:  Height:            153.00 cm            Admit Date:           9/30/2023  Weight:            79.01 kg             Admission Status:     Inpatient -                                                                Routine  BSA:               1.77 m2              [facility]  Blood Pressure: 175 /68 mmHg     Study Type:    TRANSTHORACIC ECHO (TTE) COMPLETE  Diagnosis/ICD: ST elevation (STEMI) myocardial infarction of unspecified                 site-I21.3  Indication:    ACUTE MI     Patient History:  Smoker:            Current.  Pertinent History: CAD, HTN and Hyperlipidemia.     Study Detail: The following Echo studies were performed: 2D, M-Mode, color flow                and Doppler. Technically challenging study due to body habitus.        PHYSICIAN INTERPRETATION:  Left Ventricle: Left ventricular systolic function is normal, with an estimated ejection fraction of 60%. There are no regional wall motion abnormalities. The left ventricular cavity size is  normal. There is mild to moderate concentric left ventricular hypertrophy. Spectral Doppler shows an impaired relaxation pattern of left ventricular diastolic filling.  LV Wall Scoring:  All segments are normal.     Left Atrium: The left atrium is normal in size.  Right Ventricle: The right ventricle is normal in size. There is normal right ventricular global systolic function.  Right Atrium: The right atrium is normal in size.  Aortic Valve: The aortic valve appears structurally normal. The aortic valve appears tricuspid. There is mild aortic valve cusp calcification. There is evidence of mild aortic valve stenosis.  There is no evidence of aortic valve regurgitation. The peak instantaneous gradient of the aortic valve is 25.0 mmHg. The mean gradient of the aortic valve is 14.0 mmHg.  Mitral Valve: The mitral valve was not well visualized. There is evidence of mild mitral valve stenosis. The doppler estimated mean and peak diastolic pressure gradients are 4.5 mmHg and 11.1 mmHg respectively. There is mildly decreased anterior and posterior mitral valve leaflet mobility. There is moderate mitral annular calcification. There is mild mitral valve regurgitation.  Tricuspid Valve: The tricuspid valve is structurally normal. There is normal tricuspid valve leaflet mobility. There is mild tricuspid regurgitation.  Pulmonic Valve: The pulmonic valve is structurally normal. There is no indication of pulmonic valve regurgitation.  Pericardium: There is a trivial pericardial effusion. The effusion is circumferential.  Aorta: The aortic root is normal.  Pulmonary Artery: The main pulmonary artery is normal in size, and position, with normal bifurcation into the left and right pulmonary arteries.  Systemic Veins: The inferior vena cava appears to be of normal size.  In comparison to the previous echocardiogram(s): There are no prior studies on this patient for comparison purposes.        CONCLUSIONS:   1. Left ventricular  systolic function is normal with a 60% estimated ejection fraction.   2. Spectral Doppler shows an impaired relaxation pattern of left ventricular diastolic filling.   3. There is evidence of mild mitral valve stenosis.   4. There is moderate mitral annular calcification.   5. Mild tricuspid regurgitation is visualized.   6. Mild aortic valve stenosis.   7. The main pulmonary artery is normal in size, and position, with normal bifurcation into the left and right pulmonary arteries.     RECOMMENDATIONS:  Technically suboptimal and limited study, therefore accuracy of above interpretation could be substantially diminished. Clinical correlation is advised. Consider additional imaging modalities if clinically indicated.    Ejection Fractions:  Pending  Cath:  Verbal report of multivessel disease with circumflex and OM branch intervention and stents yesterday acutely.  Residual RCA occlusion to be intervened upon tomorrow by Dr. Mendes.    Stress Test:  Nuclear Stress Test 2/13/2023            Inpatient Medications:  Scheduled medications   Medication Dose Route Frequency    atorvastatin  80 mg oral Once    carvedilol  12.5 mg oral BID    cholecalciferol  25 mcg oral Daily    FLUoxetine  20 mg oral Daily    icosapent ethyL  2 g oral BID with meals    losartan  50 mg oral Daily    melatonin  5 mg oral Nightly    nicotine  1 patch transdermal Daily    oxygen   inhalation Continuous - 02/gases    pantoprazole  20 mg oral Daily before breakfast    perflutren lipid microspheres  0.5 mL intravenous Once in imaging    perflutren protein A microsphere  0.5 mL intravenous Once in imaging    sulfur hexafluoride microsphr  2 mL intravenous Once in imaging    ticagrelor  90 mg oral BID     PRN medications   Medication    hydrALAZINE    hydrOXYzine HCL    nitroglycerin     Continuous Medications   Medication Dose Last Rate    sodium chloride 0.9%  125 mL/hr 125 mL/hr (10/02/23 0800)     Patient Active Problem List   Diagnosis    HTN  (hypertension)    ST elevation myocardial infarction (STEMI), unspecified artery (CMS/HCC)    Depressive disorder    Gastroesophageal reflux disease    Hyperlipidemia    NAFLD (nonalcoholic fatty liver disease)    Nicotine dependence    Thyroid nodule        Physical Exam:  Constitutional:       General: Awake.      Appearance: Normal appearance. Well-developed and well-groomed.   Eyes:      Conjunctiva/sclera: Conjunctivae normal.      Pupils: Pupils are equal, round, and reactive to light.   HENT:      Head: Normocephalic and atraumatic.      Ears: Tympanic membranes normal.      Nose: Nose normal.    Mouth/Throat:      Dentition: Normal.      Pharynx: Oropharynx is clear.   Neck:      Thyroid: Thyroid normal.   Pulmonary:      Effort: Pulmonary effort is normal.      Breath sounds: Normal breath sounds.   Cardiovascular:      PMI at left midclavicular line. Normal rate. Regular rhythm. Normal S1. Normal S2.       Murmurs: There is no murmur.      No gallop.  No click. No rub.   Pulses:     Intact distal pulses.   Edema:     Peripheral edema absent.   Abdominal:      General: Bowel sounds are normal.      Palpations: Abdomen is soft.   Musculoskeletal: Normal range of motion.      Cervical back: Normal range of motion and neck supple. Skin:     General: Skin is warm and dry.   Neurological:      Mental Status: Alert and oriented to person, place and time.      Cranial Nerves: Cranial nerves 2-12 are intact.      Motor: Motor function is intact.      Gait: Gait is intact.      Deep Tendon Reflexes: Reflexes are normal and symmetric.   Psychiatric:         Behavior: Behavior is cooperative.           Assessment/Plan   63-year-old female seen and evaluated at the bedside in the medical intensive care unit post myocardial infarction day #1.    Bedside examination evaluation were performed by me.    Chart was reviewed in detail including lab, EKG, x-rays and other data available and discussed with the patient and  staff.  Also discussed with Dr. Mendes who was the performing interventional list yesterday.    Impression and plan as noted.    At the present time the patient is stable.  Patient is free of any angina.  Vital signs show some mild hypertension.  Planning to add losartan 50 and increase Coreg to 12 and half twice a day.  We will adjust medications accordingly.  Patient may be scheduled to go to the eighth floor telemetry unit for the remaining part of the day and is scheduled tomorrow for RCA intervention.  We will adjust medications.  We will continue to follow labs and EKGs.    October 2, 2023:  Patient seen evaluate the bedside in the telemetry unit post MI day 2.    Bedside examination evaluation were performed by me.    Chart was reviewed in detail including lab, EKG, x-rays, echocardiogram and other data available discussed with the patient staff and family.    Impression and plan as noted.    Patient is doing well.  She is still hypertensive.  We will be adjusting medications accordingly by increasing losartan.  She is scheduled today for follow-up PCI and stenting by Dr. Mendes.  This will be done later today.  Disposition to follow.  We will continue to observe.    Patient Active Problem List   Diagnosis    HTN (hypertension)    ST elevation myocardial infarction (STEMI), unspecified artery (CMS/HCC)    Depressive disorder    Gastroesophageal reflux disease    Hyperlipidemia    NAFLD (nonalcoholic fatty liver disease)    Nicotine dependence    Thyroid nodule        Code Status:  Full Code            Norman Franklin DO

## 2023-10-02 NOTE — POST-PROCEDURE NOTE
Physician Transition of Care Summary  Invasive Cardiovascular Lab    Procedure Date: 10/2/2023  Attending:    * Nish Parnell - Primary  Resident/Fellow/Other Assistant: Cath lab staff    Pre Procedure Indications:   ACS greater than 24 hours STEMI     Post Procedure Diagnosis:   STEMI    Procedure(s):   Percutaneous Coronary Intervention    Procedure Findings:   RCA severe stenosis    Description of the Procedure:   PCI to proximal and mid RCA    Complications:   None    Stents/Implants:   Leeroy CARLOS ALBERTO 3.0x22mm, 3.0x15mm and 3.0x8mm     Anticoagulation/Antiplatelet Plan:   Continue DAPT with aspirin and brilinta    Estimated Blood Loss:   10 mL    Anesthesia: Moderate Sedation Anesthesia Staff: No anesthesia staff entered.    Any Specimen(s) Removed:   No specimens collected during this procedure.    Disposition:   Floor      Electronically signed by: Nish Parnell MD, 10/2/2023 5:23 PM

## 2023-10-02 NOTE — PROGRESS NOTES
[facility] Critical Care Medicine       Date:  10/2/2023  Patient:  Audrey Estrada  YOB: 1960  MRN:  [id number]   Admit Date:  10/1/2023      History of Present Illness:  Patient is a 63-year-old female with past medical history of anxiety, GERD, HLD, HTN who presented to the [facility] emergency department 10/1 with complaint of 3-day history of chest pain.  Patient was found to have ST elevations in leads aVL and I, with depressions in lateral leads.  Patient was given heparin, Brilinta, aspirin, Zofran, STEMI alert was called and patient was taken to the Cath Lab with Dr. Mendes.  1x drug-eluting stent was placed to the mid circumflex, 1xdrug-eluting stent was placed to the obtuse marginal.  Patient was admitted to the ICU postoperatively.    Interval ICU Events:  10/1: STEMI alert. LHC with 99% stenosis of Cx and 99% stenosis of OM. 1x CARLOS ALBERTO to Cx, 1x CARLOS ALBERTO to OM. Admitted to ICU.  10/2: Hemodynamically stable. Repeat C today.     Medical History:  Past Medical History:   Diagnosis Date    Anxiety     GERD (gastroesophageal reflux disease)     Hyperlipemia     Hyperlipidemia     Hypertension      Past Surgical History:   Procedure Laterality Date    CORONARY ANGIOPLASTY WITH STENT PLACEMENT      US ASPIRATION INJECTION INTERMEDIATE JOINT  12/23/2019    US ASPIRATION INJECTION INTERMEDIATE JOINT 12/23/2019 [facility]    US GUIDED THYROID BIOPSY  12/13/2019    US GUIDED THYROID BIOPSY 12/13/2019 [facility]     Medications Prior to Admission   Medication Sig Dispense Refill Last Dose    bisoproloL-hydrochlorothiazide (Ziac) 10-6.25 mg tablet TAKE 1 TABLET DAILY 90 tablet 0 9/30/2023 at 1100    cholecalciferol (Vitamin D-3) 25 MCG (1000 UT) capsule Take 1 capsule (25 mcg) by mouth once daily.   9/30/2023 at 1100    fenofibrate (Triglide) 160 mg tablet TAKE 1 TABLET EVERY OTHER DAY 45 tablet 0 9/30/2023 at 1100    FLUoxetine (PROzac) 20 mg capsule TAKE 1 CAPSULE DAILY 90 capsule 0 9/30/2023  at 1100    omega-3 acid ethyl esters (Lovaza) 1 gram capsule TAKE 1 CAPSULE BY MOUTH TWICE A DAY 60 capsule 5 9/30/2023 at 1100    omeprazole OTC (PriLOSEC OTC) 20 mg EC tablet Take 1 mg by mouth once daily in the morning. Take before meals. Do not crush, chew, or split.   9/30/2023 at 1100    valsartan (Diovan) 80 mg tablet TAKE 1 TABLET DAILY 90 tablet 3 9/30/2023 at 1100     Patient has no known allergies.  Social History     Tobacco Use    Smoking status: Every Day     Packs/day: 1.00     Years: 15.00     Additional pack years: 0.00     Total pack years: 15.00     Types: Cigarettes    Smokeless tobacco: Never   Vaping Use    Vaping Use: Never used   Substance Use Topics    Alcohol use: Never    Drug use: Never     Family History   Problem Relation Name Age of Onset    Heart attack Father         Hospital Medications:    sodium chloride 0.9%, 125 mL/hr, Last Rate: 125 mL/hr (10/02/23 0800)          Current Facility-Administered Medications:     atorvastatin (Lipitor) tablet 80 mg, 80 mg, oral, Once, DILEEP Burnette, DNP    carvedilol (Coreg) tablet 12.5 mg, 12.5 mg, oral, BID, Norman Franklin DO, 12.5 mg at 10/02/23 0803    cholecalciferol (Vitamin D-3) tablet 25 mcg, 25 mcg, oral, Daily, DILEEP Burnette, DNP, 25 mcg at 10/02/23 0804    FLUoxetine (PROzac) capsule 20 mg, 20 mg, oral, Daily, DILEEP Burnette, DNP, 20 mg at 10/02/23 0803    hydrALAZINE (Apresoline) injection 10 mg, 10 mg, intravenous, q4h PRN, DILEEP Zelaya, 10 mg at 10/02/23 0520    hydrOXYzine HCL (Atarax) tablet 25 mg, 25 mg, oral, q6h PRN, DILEEP Tinajero, 25 mg at 10/01/23 1936    icosapent ethyL (Vascepa) capsule 2 g, 2 g, oral, BID with meals, DILEEP Burnette, DNP, 2 g at 10/02/23 0804    [START ON 10/3/2023] losartan (Cozaar) tablet 100 mg, 100 mg, oral, Daily, Norman Franklin,     melatonin tablet 5 mg, 5 mg, oral, Nightly, Danuta Díaz, YULIA-CNP, 5 mg at 10/01/23 2054     nicotine (Nicoderm CQ) 21 mg/24 hr patch 1 patch, 1 patch, transdermal, Daily, DILEEP Tinajero, 1 patch at 10/02/23 0810    nitroglycerin (Nitrostat) SL tablet 0.4 mg, 0.4 mg, sublingual, q5 min PRN, DILEEP Burnette, URSZULA    oxygen (O2) therapy, , inhalation, Continuous - 02/gases, Nish Parnell MD, Start at 10/01/23 0800    pantoprazole (ProtoNix) EC tablet 20 mg, 20 mg, oral, Daily before breakfast, DILEEP Burnette, URSZULA, 20 mg at 10/02/23 0804    perflutren lipid microspheres (Definity) injection 0.5 mL, 0.5 mL, intravenous, Once in imaging, Nish Parnell MD    perflutren protein A microsphere (Optison) injection 0.5 mL, 0.5 mL, intravenous, Once in imaging, Nish Parnell MD    sodium chloride 0.9% infusion, 125 mL/hr, intravenous, Continuous, DILEEP Burnette, URSZULA, Last Rate: 125 mL/hr at 10/02/23 0800, 125 mL/hr at 10/02/23 0800    sulfur hexafluoride microsphr (Lumason) injection 24.28 mg, 2 mL, intravenous, Once in imaging, Nish Parnell MD    ticagrelor (Brilinta) tablet 90 mg, 90 mg, oral, BID, Nish Parnell MD, 90 mg at 10/01/23 2054    Physical Exam  Vitals:    10/02/23 0700 10/02/23 0800 10/02/23 0900 10/02/23 1000   BP: (!) 183/77 173/74 178/86 146/67   BP Location:       Patient Position:       Pulse: 66 66 74 68   Resp: 18 18 20 22   Temp:       TempSrc:       SpO2: 97% 97% 97%    Weight:       Height:           Physical Exam  Constitutional:       General: She is not in acute distress.     Appearance: Normal appearance. She is not ill-appearing.   HENT:      Head: Normocephalic and atraumatic.      Mouth/Throat:      Mouth: Mucous membranes are moist.      Pharynx: Oropharynx is clear.   Eyes:      Extraocular Movements: Extraocular movements intact.      Pupils: Pupils are equal, round, and reactive to light.   Cardiovascular:      Rate and Rhythm: Normal rate and regular rhythm.      Pulses:  Normal pulses.      Heart sounds: Normal heart sounds. No murmur heard.     No friction rub.   Pulmonary:      Effort: Pulmonary effort is normal. No respiratory distress.      Breath sounds: Normal breath sounds. No wheezing.   Abdominal:      General: There is no distension.      Palpations: Abdomen is soft. There is no mass.      Tenderness: There is no abdominal tenderness.   Musculoskeletal:         General: Normal range of motion.      Cervical back: Normal range of motion and neck supple.   Skin:     General: Skin is warm and dry.      Capillary Refill: Capillary refill takes less than 2 seconds.      Coloration: Skin is not jaundiced.      Findings: No bruising.   Neurological:      General: No focal deficit present.      Mental Status: She is alert and oriented to person, place, and time. Mental status is at baseline.         Objective      Intake/Output Summary (Last 24 hours) at 10/2/2023 1054  Last data filed at 10/2/2023 0600  Gross per 24 hour   Intake 1727 ml   Output --   Net 1727 ml       Admission on 10/01/2023   Component Date Value    WBC 10/01/2023 9.9     nRBC 10/01/2023 0.0     RBC 10/01/2023 4.98     Hemoglobin 10/01/2023 11.4 (L)     Hematocrit 10/01/2023 38.0     MCV 10/01/2023 76 (L)     MCH 10/01/2023 22.9 (L)     MCHC 10/01/2023 30.0 (L)     RDW 10/01/2023 16.2 (H)     Platelets 10/01/2023 331     MPV 10/01/2023 9.9     Neutrophils % 10/01/2023 67.1     Immature Granulocytes %,* 10/01/2023 0.7     Lymphocytes % 10/01/2023 20.6     Monocytes % 10/01/2023 6.9     Eosinophils % 10/01/2023 3.9     Basophils % 10/01/2023 0.8     Neutrophils Absolute 10/01/2023 6.63     Immature Granulocytes Ab* 10/01/2023 0.07     Lymphocytes Absolute 10/01/2023 2.03     Monocytes Absolute 10/01/2023 0.68     Eosinophils Absolute 10/01/2023 0.38     Basophils Absolute 10/01/2023 0.08     Glucose 10/01/2023 123 (H)     Sodium 10/01/2023 139     Potassium 10/01/2023 3.3 (L)     Chloride 10/01/2023 102      Bicarbonate 10/01/2023 25     Anion Gap 10/01/2023 15     Urea Nitrogen 10/01/2023 20     Creatinine 10/01/2023 0.87     eGFR 10/01/2023 74     Calcium 10/01/2023 9.8     Albumin 10/01/2023 4.3     Alkaline Phosphatase 10/01/2023 50     Total Protein 10/01/2023 8.3 (H)     AST 10/01/2023 28     Bilirubin, Total 10/01/2023 0.3     ALT 10/01/2023 22     Magnesium 10/01/2023 1.70     Coronavirus 2019, PCR 10/01/2023 Not Detected     D-Dimer Non VTE, Quant (* 10/01/2023 690 (H)     Troponin I, High Sensiti* 10/01/2023 210 (HH)     Troponin I, High Sensiti* 10/01/2023 454 (HH)     Ventricular Rate 10/01/2023 68     Atrial Rate 10/01/2023 68     NJ Interval 10/01/2023 144     QRS Duration 10/01/2023 88     QT Interval 10/01/2023 440     QTC Calculation(Bazett) 10/01/2023 467     P Axis 10/01/2023 51     R Axis 10/01/2023 -41     T Axis 10/01/2023 72     QRS Count 10/01/2023 11     Q Onset 10/01/2023 212     P Onset 10/01/2023 140     P Offset 10/01/2023 195     T Offset 10/01/2023 432     QTC Fredericia 10/01/2023 459     WBC 10/01/2023 8.8     nRBC 10/01/2023 0.0     RBC 10/01/2023 3.95 (L)     Hemoglobin 10/01/2023 8.9 (L)     Hematocrit 10/01/2023 30.4 (L)     MCV 10/01/2023 77 (L)     MCH 10/01/2023 22.5 (L)     MCHC 10/01/2023 29.3 (L)     RDW 10/01/2023 16.1 (H)     Platelets 10/01/2023 226     MPV 10/01/2023 9.8     Glucose 10/01/2023 92     Sodium 10/01/2023 139     Potassium 10/01/2023 3.5     Chloride 10/01/2023 106     Bicarbonate 10/01/2023 27     Anion Gap 10/01/2023 10     Urea Nitrogen 10/01/2023 17     Creatinine 10/01/2023 0.69     eGFR 10/01/2023 >90     Calcium 10/01/2023 8.6     Phosphorus 10/01/2023 4.4     Albumin 10/01/2023 3.4     Cholesterol 10/01/2023 158     HDL-Cholesterol 10/01/2023 28.7     Cholesterol/HDL Ratio 10/01/2023 5.5     LDL Calculated 10/01/2023 92 (L)     VLDL 10/01/2023 38     Triglycerides 10/01/2023 189 (H)     Non HDL Cholesterol 10/01/2023 129     Hemoglobin A1C 10/01/2023  6.2 (H)     Estimated Average Glucose 10/01/2023 131     Protime 10/01/2023 12.5     INR 10/01/2023 1.1     aPTT 10/01/2023 51 (H)     LV A4C EF 10/01/2023 72.4     Troponin I, High Sensiti* 10/01/2023 1,105 (HH)     Troponin I, High Sensiti* 10/01/2023 1,609 (HH)     Troponin I, High Sensiti* 10/02/2023 1,214 (HH)     Ventricular Rate 10/02/2023 78     Atrial Rate 10/02/2023 78     AL Interval 10/02/2023 134     QRS Duration 10/02/2023 88     QT Interval 10/02/2023 410     QTC Calculation(Bazett) 10/02/2023 467     P Axis 10/02/2023 50     R Axis 10/02/2023 -61     T Axis 10/02/2023 83     QRS Count 10/02/2023 13     Q Onset 10/02/2023 213     P Onset 10/02/2023 146     P Offset 10/02/2023 196     T Offset 10/02/2023 418     QTC Fredericia 10/02/2023 447     Troponin I, High Sensiti* 10/02/2023 837 (HH)     WBC 10/02/2023 8.6     nRBC 10/02/2023 0.0     RBC 10/02/2023 4.13     Hemoglobin 10/02/2023 9.2 (L)     Hematocrit 10/02/2023 31.5 (L)     MCV 10/02/2023 76 (L)     MCH 10/02/2023 22.3 (L)     MCHC 10/02/2023 29.2 (L)     RDW 10/02/2023 16.3 (H)     Platelets 10/02/2023 232     MPV 10/02/2023 9.6     Neutrophils % 10/02/2023 76.2     Immature Granulocytes %,* 10/02/2023 0.3     Lymphocytes % 10/02/2023 13.3     Monocytes % 10/02/2023 6.1     Eosinophils % 10/02/2023 3.6     Basophils % 10/02/2023 0.5     Neutrophils Absolute 10/02/2023 6.56     Immature Granulocytes Ab* 10/02/2023 0.03     Lymphocytes Absolute 10/02/2023 1.15 (L)     Monocytes Absolute 10/02/2023 0.53     Eosinophils Absolute 10/02/2023 0.31     Basophils Absolute 10/02/2023 0.04     Glucose 10/02/2023 98     Sodium 10/02/2023 140     Potassium 10/02/2023 3.3 (L)     Chloride 10/02/2023 108 (H)     Bicarbonate 10/02/2023 24     Anion Gap 10/02/2023 11     Urea Nitrogen 10/02/2023 9     Creatinine 10/02/2023 0.49 (L)     eGFR 10/02/2023 >90     Calcium 10/02/2023 8.7     Magnesium 10/02/2023 1.51 (L)     Phosphorus 10/02/2023 3.6              Transthoracic Echo (TTE) Complete    Result Date: 10/1/2023          [place] [id number]  Tel [phone] Fax [phone] TRANSTHORACIC ECHOCARDIOGRAM REPORT  Patient Name:      EH PHILLIPS RAMA Walker Physician:    [id number] Norman Franklin  Study Date:        10/1/2023            Ordering Provider:    [id number] RICARDO BENOIT MRN/PID:           [id number]             Fellow: Accession#:        [id number]         Nurse: Date of Birth/Age: 1960 / 63 years Sonographer:          Lata Wilson RDCS Gender:            F                    Additional Staff: Height:            153.00 cm            Admit Date:           9/30/2023 Weight:            79.01 kg             Admission Status:     Inpatient -                                                               Routine BSA:               1.77 m2              Department Location:  [facility] Blood Pressure: 175 /68 mmHg Study Type:    TRANSTHORACIC ECHO (TTE) COMPLETE Diagnosis/ICD: ST elevation (STEMI) myocardial infarction of unspecified                site-I21.3 Indication:    ACUTE MI Patient History: Smoker:            Current. Pertinent History: CAD, HTN and Hyperlipidemia. Study Detail: The following Echo studies were performed: 2D, M-Mode, color flow               and Doppler. Technically challenging study due to body habitus.  PHYSICIAN INTERPRETATION: Left Ventricle: Left ventricular systolic function is normal, with an estimated ejection fraction of 60%. There are no regional wall motion abnormalities. The left ventricular cavity size is normal. There is mild to moderate concentric left ventricular hypertrophy. Spectral Doppler shows an impaired relaxation pattern of left ventricular diastolic filling. LV Wall  Scoring: All segments are normal. Left Atrium: The left atrium is normal in size. Right Ventricle: The right ventricle is normal in size. There is normal right ventricular global systolic function. Right Atrium: The right atrium is normal in size. Aortic Valve: The aortic valve appears structurally normal. The aortic valve appears tricuspid. There is mild aortic valve cusp calcification. There is evidence of mild aortic valve stenosis. There is no evidence of aortic valve regurgitation. The peak instantaneous gradient of the aortic valve is 25.0 mmHg. The mean gradient of the aortic valve is 14.0 mmHg. Mitral Valve: The mitral valve was not well visualized. There is evidence of mild mitral valve stenosis. The doppler estimated mean and peak diastolic pressure gradients are 4.5 mmHg and 11.1 mmHg respectively. There is mildly decreased anterior and posterior mitral valve leaflet mobility. There is moderate mitral annular calcification. There is mild mitral valve regurgitation. Tricuspid Valve: The tricuspid valve is structurally normal. There is normal tricuspid valve leaflet mobility. There is mild tricuspid regurgitation. Pulmonic Valve: The pulmonic valve is structurally normal. There is no indication of pulmonic valve regurgitation. Pericardium: There is a trivial pericardial effusion. The effusion is circumferential. Aorta: The aortic root is normal. Pulmonary Artery: The main pulmonary artery is normal in size, and position, with normal bifurcation into the left and right pulmonary arteries. Systemic Veins: The inferior vena cava appears to be of normal size. In comparison to the previous echocardiogram(s): There are no prior studies on this patient for comparison purposes.  CONCLUSIONS:  1. Left ventricular systolic function is normal with a 60% estimated ejection fraction.  2. Spectral Doppler shows an impaired relaxation pattern of left ventricular diastolic filling.  3. There is evidence of mild mitral  valve stenosis.  4. There is moderate mitral annular calcification.  5. Mild tricuspid regurgitation is visualized.  6. Mild aortic valve stenosis.  7. The main pulmonary artery is normal in size, and position, with normal bifurcation into the left and right pulmonary arteries. RECOMMENDATIONS: Technically suboptimal and limited study, therefore accuracy of above interpretation could be substantially diminished. Clinical correlation is advised. Consider additional imaging modalities if clinically indicated.  QUANTITATIVE DATA SUMMARY: 2D MEASUREMENTS:                          Normal Ranges: IVSd:          1.23 cm   (0.6-1.1cm) LVPWd:         1.30 cm   (0.6-1.1cm) LVIDd:         3.80 cm   (3.9-5.9cm) LVIDs:         2.57 cm LV Mass Index: 94.0 g/m2 LV % FS        32.4 % LA VOLUME:                               Normal Ranges: LA Vol A4C:        41.5 ml    (22+/-6mL/m2) LA Vol A2C:        49.1 ml LA Vol BP:         47.4 ml LA Vol Index A4C:  23.5ml/m2 LA Vol Index A2C:  27.8 ml/m2 LA Vol Index BP:   26.9 ml/m2 LA Area A4C:       15.3 cm2 LA Area A2C:       17.5 cm2 LA Major Axis A4C: 4.8 cm LA Major Axis A2C: 5.3 cm LA Volume Index:   24.9 ml/m2 RA VOLUME BY A/L METHOD:                               Normal Ranges: RA Vol A4C:        20.2 ml    (8.3-19.5ml) RA Vol Index A4C:  11.5 ml/m2 RA Area A4C:       9.8 cm2 RA Major Axis A4C: 4.0 cm LV SYSTOLIC FUNCTION BY 2D PLANIMETRY (MOD):                     Normal Ranges: EF-A4C View: 72.4 % (>=55%) EF-A2C View: 64.1 % EF-Biplane:  68.0 % LV DIASTOLIC FUNCTION:                        Normal Ranges: MV Peak E:    1.21 m/s (0.7-1.2 m/s) MV Peak A:    1.24 m/s (0.42-0.7 m/s) E/A Ratio:    0.98     (1.0-2.2) MV e'         0.03 m/s (>8.0) MV lateral e' 0.03 m/s MV medial e'  0.06 m/s E/e' Ratio:   34.77    (<8.0) MITRAL VALVE:                       Normal Ranges: MV Vmax:    1.66 m/s  (<=1.3m/s) MV peak P.1 mmHg (<5mmHg) MV mean P.5 mmHg  (<48mmHg) MV DT:      290 msec   (150-240msec) AORTIC VALVE:                                    Normal Ranges: AoV Vmax:                2.50 m/s  (<=1.7m/s) AoV Peak P.0 mmHg (<20mmHg) AoV Mean P.0 mmHg (1.7-11.5mmHg) LVOT Max Jim:            1.47 m/s  (<=1.1m/s) AoV VTI:                 56.40 cm  (18-25cm) LVOT VTI:                35.60 cm LVOT Diameter:           2.00 cm   (1.8-2.4cm) AoV Area, VTI:           1.98 cm2  (2.5-5.5cm2) AoV Area,Vmax:           1.85 cm2  (2.5-4.5cm2) AoV Dimensionless Index: 0.63  RIGHT VENTRICLE: RV Basal 2.86 cm RV Mid   2.16 cm RV Major 7.0 cm TAPSE:   27.3 mm RV s'    0.10 m/s TRICUSPID VALVE/RVSP:                             Normal Ranges: Peak TR Velocity: 1.95 m/s RV Syst Pressure: 18.2 mmHg (< 30mmHg) PULMONIC VALVE:                         Normal Ranges: PV Accel Time: 108 msec (>120ms) PV Max Jim:    1.3 m/s  (0.6-0.9m/s) PV Max P.4 mmHg  [id number] Franklin DO Electronically signed on 10/1/2023 at 3:28:59 PM  Wall Scoring  ** Final **      Electrocardiogram 12 Lead  Normal sinus rhythm  Left anterior fascicular block  Nonspecific ST abnormality  Abnormal ECG  When compared with ECG of 01-OCT-2023 06:34,  No significant change was found  Confirmed by Charleen Hogan ([id number]) on 10/2/2023 8:57:04 AM        Assessment/Plan:    I am currently managing this critically ill patient for:  STEMI  HTN, HLD  GERD  Anxiety    Daily Plan:    #STEMI  [facility] 10/1 with 99% stenosis OM s/p 1x CARLOS ALBERTO, 99% stenosis Cx s/p 1x CARLOS ALBERTO  -Telemetry monitoring  -Echocardiogram with EF 60%, mild MV stenosis, mild TV regurgitation  -Continue DAPT with aspirin, Brilinta  -Continue Carvedilol 12.5mg twice daily  -Continue statin, vascepa  -[facility] today with possible PCI to RCA    #HTN, HLD  -Statin, Vascepa  -Carvedilol, Losartan    #GERD  -Protonix    #Anxiety  -Prozac      DVT Prophylaxis: Holding  GI Prophylaxis: Not indicated  Diet: NPO, advance after LHC  CVC: None  [place]: None  [place]: None  Restraints:  None  Code Status: Full Code  Dispo: ICU      Critical Care Time:  35

## 2023-10-02 NOTE — CARE PLAN
Problem: Pain - Adult  Goal: Verbalizes/displays adequate comfort level or baseline comfort level  Outcome: Progressing     Problem: Safety - Adult  Goal: Free from fall injury  Outcome: Progressing     Problem: Chronic Conditions and Co-morbidities  Goal: Patient's chronic conditions and co-morbidity symptoms are monitored and maintained or improved  Outcome: Progressing  The patient's goals for the shift include      The clinical goals for the shift include progressing    Over the shift, the patient did not make progress toward the following goals. Barriers to progression include none. Recommendations to address these barriers include none.

## 2023-10-03 ENCOUNTER — APPOINTMENT (OUTPATIENT)
Dept: CARDIOLOGY | Facility: HOSPITAL | Age: 63
DRG: 321 | End: 2023-10-03
Payer: COMMERCIAL

## 2023-10-03 ENCOUNTER — PHARMACY VISIT (OUTPATIENT)
Dept: PHARMACY | Facility: CLINIC | Age: 63
End: 2023-10-03
Payer: COMMERCIAL

## 2023-10-03 VITALS
HEIGHT: 60 IN | OXYGEN SATURATION: 99 % | RESPIRATION RATE: 18 BRPM | DIASTOLIC BLOOD PRESSURE: 63 MMHG | BODY MASS INDEX: 32.98 KG/M2 | HEART RATE: 68 BPM | TEMPERATURE: 97.7 F | SYSTOLIC BLOOD PRESSURE: 132 MMHG | WEIGHT: 167.99 LBS

## 2023-10-03 DIAGNOSIS — I25.110 CORONARY ARTERY DISEASE INVOLVING NATIVE CORONARY ARTERY OF NATIVE HEART WITH UNSTABLE ANGINA PECTORIS (MULTI): Primary | ICD-10-CM

## 2023-10-03 PROBLEM — I21.3 ST ELEVATION MYOCARDIAL INFARCTION (STEMI), UNSPECIFIED ARTERY (MULTI): Status: RESOLVED | Noted: 2023-10-01 | Resolved: 2023-10-03

## 2023-10-03 LAB
ANION GAP SERPL CALC-SCNC: 12 MMOL/L (ref 10–20)
ATRIAL RATE: 66 BPM
ATRIAL RATE: 78 BPM
BUN SERPL-MCNC: 16 MG/DL (ref 6–23)
CALCIUM SERPL-MCNC: 8.5 MG/DL (ref 8.6–10.3)
CHLORIDE SERPL-SCNC: 108 MMOL/L (ref 98–107)
CO2 SERPL-SCNC: 23 MMOL/L (ref 21–32)
CREAT SERPL-MCNC: 0.66 MG/DL (ref 0.5–1.05)
ERYTHROCYTE [DISTWIDTH] IN BLOOD BY AUTOMATED COUNT: 16.6 % (ref 11.5–14.5)
GFR SERPL CREATININE-BSD FRML MDRD: >90 ML/MIN/1.73M*2
GLUCOSE SERPL-MCNC: 93 MG/DL (ref 74–99)
HCT VFR BLD AUTO: 30.2 % (ref 36–46)
HGB BLD-MCNC: 8.9 G/DL (ref 12–16)
MAGNESIUM SERPL-MCNC: 1.5 MG/DL (ref 1.6–2.4)
MCH RBC QN AUTO: 22.7 PG (ref 26–34)
MCHC RBC AUTO-ENTMCNC: 29.5 G/DL (ref 32–36)
MCV RBC AUTO: 77 FL (ref 80–100)
NRBC BLD-RTO: 0 /100 WBCS (ref 0–0)
P AXIS: 49 DEGREES
P AXIS: 52 DEGREES
P OFFSET: 194 MS
P OFFSET: 199 MS
P ONSET: 141 MS
P ONSET: 148 MS
PLATELET # BLD AUTO: 230 X10*3/UL (ref 150–450)
PMV BLD AUTO: 9.9 FL (ref 7.5–11.5)
POTASSIUM SERPL-SCNC: 3.2 MMOL/L (ref 3.5–5.3)
PR INTERVAL: 130 MS
PR INTERVAL: 134 MS
Q ONSET: 208 MS
Q ONSET: 213 MS
QRS COUNT: 11 BEATS
QRS COUNT: 12 BEATS
QRS DURATION: 88 MS
QRS DURATION: 90 MS
QT INTERVAL: 436 MS
QT INTERVAL: 470 MS
QTC CALCULATION(BAZETT): 492 MS
QTC CALCULATION(BAZETT): 497 MS
QTC FREDERICIA: 475 MS
QTC FREDERICIA: 485 MS
R AXIS: -52 DEGREES
R AXIS: -59 DEGREES
RBC # BLD AUTO: 3.92 X10*6/UL (ref 4–5.2)
SODIUM SERPL-SCNC: 140 MMOL/L (ref 136–145)
T AXIS: 106 DEGREES
T AXIS: 94 DEGREES
T OFFSET: 426 MS
T OFFSET: 448 MS
VENTRICULAR RATE: 66 BPM
VENTRICULAR RATE: 78 BPM
WBC # BLD AUTO: 9.5 X10*3/UL (ref 4.4–11.3)

## 2023-10-03 PROCEDURE — 93010 ELECTROCARDIOGRAM REPORT: CPT | Performed by: INTERNAL MEDICINE

## 2023-10-03 PROCEDURE — 99291 CRITICAL CARE FIRST HOUR: CPT

## 2023-10-03 PROCEDURE — 99238 HOSP IP/OBS DSCHRG MGMT 30/<: CPT

## 2023-10-03 PROCEDURE — 80048 BASIC METABOLIC PNL TOTAL CA: CPT | Performed by: STUDENT IN AN ORGANIZED HEALTH CARE EDUCATION/TRAINING PROGRAM

## 2023-10-03 PROCEDURE — RXMED WILLOW AMBULATORY MEDICATION CHARGE

## 2023-10-03 PROCEDURE — 36415 COLL VENOUS BLD VENIPUNCTURE: CPT | Performed by: STUDENT IN AN ORGANIZED HEALTH CARE EDUCATION/TRAINING PROGRAM

## 2023-10-03 PROCEDURE — 93005 ELECTROCARDIOGRAM TRACING: CPT

## 2023-10-03 PROCEDURE — 2500000004 HC RX 250 GENERAL PHARMACY W/ HCPCS (ALT 636 FOR OP/ED): Performed by: STUDENT IN AN ORGANIZED HEALTH CARE EDUCATION/TRAINING PROGRAM

## 2023-10-03 PROCEDURE — 2500000001 HC RX 250 WO HCPCS SELF ADMINISTERED DRUGS (ALT 637 FOR MEDICARE OP): Performed by: STUDENT IN AN ORGANIZED HEALTH CARE EDUCATION/TRAINING PROGRAM

## 2023-10-03 PROCEDURE — 85027 COMPLETE CBC AUTOMATED: CPT | Performed by: STUDENT IN AN ORGANIZED HEALTH CARE EDUCATION/TRAINING PROGRAM

## 2023-10-03 PROCEDURE — S4991 NICOTINE PATCH NONLEGEND: HCPCS | Performed by: STUDENT IN AN ORGANIZED HEALTH CARE EDUCATION/TRAINING PROGRAM

## 2023-10-03 PROCEDURE — 2500000002 HC RX 250 W HCPCS SELF ADMINISTERED DRUGS (ALT 637 FOR MEDICARE OP, ALT 636 FOR OP/ED): Performed by: STUDENT IN AN ORGANIZED HEALTH CARE EDUCATION/TRAINING PROGRAM

## 2023-10-03 PROCEDURE — 99233 SBSQ HOSP IP/OBS HIGH 50: CPT | Performed by: INTERNAL MEDICINE

## 2023-10-03 PROCEDURE — 83735 ASSAY OF MAGNESIUM: CPT | Performed by: STUDENT IN AN ORGANIZED HEALTH CARE EDUCATION/TRAINING PROGRAM

## 2023-10-03 PROCEDURE — 2500000004 HC RX 250 GENERAL PHARMACY W/ HCPCS (ALT 636 FOR OP/ED)

## 2023-10-03 RX ORDER — HYDROCHLOROTHIAZIDE 12.5 MG/1
12.5 TABLET ORAL DAILY
Qty: 30 TABLET | Refills: 0 | Status: SHIPPED | OUTPATIENT
Start: 2023-10-04 | End: 2023-10-31 | Stop reason: SDUPTHER

## 2023-10-03 RX ORDER — POTASSIUM CHLORIDE 20 MEQ/1
40 TABLET, EXTENDED RELEASE ORAL ONCE
Status: COMPLETED | OUTPATIENT
Start: 2023-10-03 | End: 2023-10-03

## 2023-10-03 RX ORDER — CARVEDILOL 25 MG/1
25 TABLET ORAL 2 TIMES DAILY
Qty: 60 TABLET | Refills: 0 | Status: SHIPPED | OUTPATIENT
Start: 2023-10-03 | End: 2023-10-31 | Stop reason: SDUPTHER

## 2023-10-03 RX ORDER — LOSARTAN POTASSIUM 100 MG/1
100 TABLET ORAL DAILY
Qty: 30 TABLET | Refills: 0 | Status: SHIPPED | OUTPATIENT
Start: 2023-10-04 | End: 2023-10-31 | Stop reason: SDUPTHER

## 2023-10-03 RX ORDER — MAGNESIUM SULFATE HEPTAHYDRATE 40 MG/ML
2 INJECTION, SOLUTION INTRAVENOUS ONCE
Status: COMPLETED | OUTPATIENT
Start: 2023-10-03 | End: 2023-10-03

## 2023-10-03 RX ORDER — ASPIRIN 81 MG/1
81 TABLET ORAL DAILY
Qty: 30 TABLET | Refills: 0 | Status: SHIPPED | OUTPATIENT
Start: 2023-10-03

## 2023-10-03 RX ORDER — ASPIRIN 81 MG/1
81 TABLET ORAL DAILY
Status: DISCONTINUED | OUTPATIENT
Start: 2023-10-03 | End: 2023-10-03 | Stop reason: HOSPADM

## 2023-10-03 RX ORDER — ATORVASTATIN CALCIUM 80 MG/1
80 TABLET, FILM COATED ORAL DAILY
Qty: 30 TABLET | Refills: 0 | Status: SHIPPED | OUTPATIENT
Start: 2023-10-03 | End: 2023-10-31 | Stop reason: SDUPTHER

## 2023-10-03 RX ADMIN — TICAGRELOR 90 MG: 90 TABLET ORAL at 04:10

## 2023-10-03 RX ADMIN — FLUOXETINE HYDROCHLORIDE 20 MG: 20 CAPSULE ORAL at 08:02

## 2023-10-03 RX ADMIN — HYDROCHLOROTHIAZIDE 12.5 MG: 25 TABLET ORAL at 08:08

## 2023-10-03 RX ADMIN — NICOTINE 1 PATCH: 21 PATCH, EXTENDED RELEASE TRANSDERMAL at 08:02

## 2023-10-03 RX ADMIN — POTASSIUM CHLORIDE 40 MEQ: 1500 TABLET, EXTENDED RELEASE ORAL at 06:49

## 2023-10-03 RX ADMIN — HYDRALAZINE HYDROCHLORIDE 10 MG: 20 INJECTION INTRAMUSCULAR; INTRAVENOUS at 04:11

## 2023-10-03 RX ADMIN — MAGNESIUM SULFATE HEPTAHYDRATE 2 G: 40 INJECTION, SOLUTION INTRAVENOUS at 06:49

## 2023-10-03 RX ADMIN — CARVEDILOL 25 MG: 12.5 TABLET, FILM COATED ORAL at 08:02

## 2023-10-03 RX ADMIN — PANTOPRAZOLE SODIUM 20 MG: 20 TABLET, DELAYED RELEASE ORAL at 06:16

## 2023-10-03 RX ADMIN — LOSARTAN POTASSIUM 100 MG: 100 TABLET, FILM COATED ORAL at 08:07

## 2023-10-03 RX ADMIN — Medication 25 MCG: at 08:03

## 2023-10-03 RX ADMIN — ICOSAPENT ETHYL 2 G: 1 CAPSULE ORAL at 08:03

## 2023-10-03 ASSESSMENT — COGNITIVE AND FUNCTIONAL STATUS - GENERAL: MOBILITY SCORE: 24

## 2023-10-03 ASSESSMENT — PAIN SCALES - GENERAL: PAINLEVEL_OUTOF10: 0 - NO PAIN

## 2023-10-03 NOTE — DISCHARGE SUMMARY
Discharge Diagnosis  ST elevation myocardial infarction (STEMI), unspecified artery (CMS/HCC)    Issues Requiring Follow-Up  Follow up with Dr. Mendes for further management of CAD.     Test Results Pending At Discharge  Pending Labs       Order Current Status    Extra Tubes In process    Extra Tubes In process    Avitia Top In process    SST TOP In process            Hospital Course  Patient is a 63-year-old female with past medical history of anxiety, GERD, HLD, HTN who presented to the [facility] emergency department 10/1 with complaint of 3-day history of chest pain.  Patient was found to have ST elevations in leads aVL and I, with depressions in lateral leads.  Patient was given heparin, Brilinta, aspirin, Zofran, STEMI alert was called and patient was taken to the Cath Lab with Dr. Mendes.  1x drug-eluting stent was placed to the mid circumflex, 1xdrug-eluting stent was placed to the obtuse marginal.  Patient was admitted to the ICU postoperatively.     On 10/1 patient underwent LHC and was found to have 99% stenosis of the circumflex and 99% stenosis of the OM, 1xDES was placed circumflex and 1x CARLOS ALBERTO was placed to OM.  On 10/2, repeat LHC was done and 3x CARLOS ALBERTO were placed to RCA.  On 10/3 patient remained hemodynamically stable and was evaluated by cardiology once approved for discharge home with plan for follow-up and further angioplasty as an outpatient with Dr. Mendes.  Patient was discharged home with new prescriptions for aspirin, Brilinta, atorvastatin, carvedilol, hydrochlorothiazide, losartan.    Pertinent Physical Exam At Time of Discharge  Constitutional:       General: She is not in acute distress.     Appearance: Normal appearance. She is not ill-appearing.   HENT:      Head: Normocephalic and atraumatic.      Mouth/Throat:      Mouth: Mucous membranes are moist.      Pharynx: Oropharynx is clear.   Eyes:      Extraocular Movements: Extraocular movements intact.      Pupils: Pupils are equal, round, and  reactive to light.   Cardiovascular:      Rate and Rhythm: Normal rate and regular rhythm.      Pulses: Normal pulses.      Heart sounds: Normal heart sounds. No murmur heard.     No friction rub.   Pulmonary:      Effort: Pulmonary effort is normal. No respiratory distress.      Breath sounds: Normal breath sounds. No wheezing.   Abdominal:      General: There is no distension.      Palpations: Abdomen is soft. There is no mass.      Tenderness: There is no abdominal tenderness.   Musculoskeletal:         General: Normal range of motion.      Cervical back: Normal range of motion and neck supple.   Skin:     General: Skin is warm and dry.      Capillary Refill: Capillary refill takes less than 2 seconds.      Coloration: Skin is not jaundiced.      Findings: No bruising.   Neurological:      General: No focal deficit present.      Mental Status: She is alert and oriented to person, place, and time. Mental status is at baseline.     Home Medications     Medication List      ASK your doctor about these medications     bisoproloL-hydrochlorothiazide 10-6.25 mg tablet; Commonly known as:   Ziac; TAKE 1 TABLET DAILY   cholecalciferol 25 MCG (1000 UT) capsule; Commonly known as: Vitamin D-3   fenofibrate 160 mg tablet; Commonly known as: Triglide; TAKE 1 TABLET   EVERY OTHER DAY   FLUoxetine 20 mg capsule; Commonly known as: PROzac; TAKE 1 CAPSULE   DAILY   omega-3 acid ethyl esters 1 gram capsule; Commonly known as: Lovaza;   TAKE 1 CAPSULE BY MOUTH TWICE A DAY   omeprazole OTC 20 mg EC tablet; Commonly known as: PriLOSEC OTC   valsartan 80 mg tablet; Commonly known as: Diovan; TAKE 1 TABLET DAILY       Outpatient Follow-Up  Future Appointments   Date Time Provider Department [place]   10/13/2023  8:15 AM Cam Araya MD [id number] [facility] [place], APRN-CNP

## 2023-10-03 NOTE — CARE PLAN
Problem: Pain - Adult  Goal: Verbalizes/displays adequate comfort level or baseline comfort level  Outcome: Met     Problem: Safety - Adult  Goal: Free from fall injury  Outcome: Met     Problem: Discharge Planning  Goal: Discharge to home or other facility with appropriate resources  Outcome: Met     Problem: Chronic Conditions and Co-morbidities  Goal: Patient's chronic conditions and co-morbidity symptoms are monitored and maintained or improved  Outcome: Met   The patient's goals for the shift include      The clinical goals for the shift include to be free of chest pain

## 2023-10-03 NOTE — PROGRESS NOTES
[facility] Critical Care Medicine       Date:  10/3/2023  Patient:  Audrey Estrada  YOB: 1960  MRN:  [id number]   Admit Date:  10/1/2023      History of Present Illness:  Patient is a 63-year-old female with past medical history of anxiety, GERD, HLD, HTN who presented to the [facility] emergency department 10/1 with complaint of 3-day history of chest pain.  Patient was found to have ST elevations in leads aVL and I, with depressions in lateral leads.  Patient was given heparin, Brilinta, aspirin, Zofran, STEMI alert was called and patient was taken to the Cath Lab with Dr. Mendes.  1x drug-eluting stent was placed to the mid circumflex, 1xdrug-eluting stent was placed to the obtuse marginal.  Patient was admitted to the ICU postoperatively.    Interval ICU Events:  10/1: STEMI alert. LHC with 99% stenosis of Cx and 99% stenosis of OM. 1x CARLOS ALBERTO to Cx, 1x CARLOS ALBERTO to OM. Admitted to ICU.  10/2: Hemodynamically stable. Repeat LHC and 3x CARLOS ALBERTO to RCA.  10/3: Hemodynamically stable.  BP well controlled.  Stable for discharge from ICU standpoint, will clear with Cardiology.    Medical History:  Past Medical History:   Diagnosis Date    Anxiety     GERD (gastroesophageal reflux disease)     Hyperlipemia     Hyperlipidemia     Hypertension      Past Surgical History:   Procedure Laterality Date    CORONARY ANGIOPLASTY WITH STENT PLACEMENT      US ASPIRATION INJECTION INTERMEDIATE JOINT  12/23/2019    US ASPIRATION INJECTION INTERMEDIATE JOINT 12/23/2019 [facility]    US GUIDED THYROID BIOPSY  12/13/2019    US GUIDED THYROID BIOPSY 12/13/2019 [facility]     Medications Prior to Admission   Medication Sig Dispense Refill Last Dose    bisoproloL-hydrochlorothiazide (Ziac) 10-6.25 mg tablet TAKE 1 TABLET DAILY 90 tablet 0 9/30/2023 at 1100    cholecalciferol (Vitamin D-3) 25 MCG (1000 UT) capsule Take 1 capsule (25 mcg) by mouth once daily.   9/30/2023 at 1100    fenofibrate (Triglide) 160 mg tablet TAKE 1  TABLET EVERY OTHER DAY 45 tablet 0 9/30/2023 at 1100    FLUoxetine (PROzac) 20 mg capsule TAKE 1 CAPSULE DAILY 90 capsule 0 9/30/2023 at 1100    omega-3 acid ethyl esters (Lovaza) 1 gram capsule TAKE 1 CAPSULE BY MOUTH TWICE A DAY 60 capsule 5 9/30/2023 at 1100    omeprazole OTC (PriLOSEC OTC) 20 mg EC tablet Take 1 mg by mouth once daily in the morning. Take before meals. Do not crush, chew, or split.   9/30/2023 at 1100    valsartan (Diovan) 80 mg tablet TAKE 1 TABLET DAILY 90 tablet 3 9/30/2023 at 1100     Patient has no known allergies.  Social History     Tobacco Use    Smoking status: Every Day     Packs/day: 1.00     Years: 15.00     Additional pack years: 0.00     Total pack years: 15.00     Types: Cigarettes    Smokeless tobacco: Never   Vaping Use    Vaping Use: Never used   Substance Use Topics    Alcohol use: Never    Drug use: Never     Family History   Problem Relation Name Age of Onset    Heart attack Father         Hospital Medications:             Current Facility-Administered Medications:     atorvastatin (Lipitor) tablet 80 mg, 80 mg, oral, Once, Nish Parnell MD    carvedilol (Coreg) tablet 25 mg, 25 mg, oral, BID, Nish Parnell MD, 25 mg at 10/03/23 0802    cholecalciferol (Vitamin D-3) tablet 25 mcg, 25 mcg, oral, Daily, Nish Parnell MD, 25 mcg at 10/03/23 0803    FLUoxetine (PROzac) capsule 20 mg, 20 mg, oral, Daily, Nish Parnell MD, 20 mg at 10/03/23 0802    hydrALAZINE (Apresoline) injection 10 mg, 10 mg, intravenous, q4h PRN, Nish Parnell MD, 10 mg at 10/03/23 0411    hydroCHLOROthiazide (HYDRODiuril) tablet 12.5 mg, 12.5 mg, oral, Daily, Nish Parnell MD, 12.5 mg at 10/03/23 0808    hydrOXYzine HCL (Atarax) tablet 25 mg, 25 mg, oral, q6h PRN, Nish Parnell MD, 25 mg at 10/02/23 1429    icosapent ethyL (Vascepa) capsule 2 g, 2 g, oral, BID with meals, Nish Parnell MD, 2 g at  10/03/23 0803    losartan (Cozaar) tablet 100 mg, 100 mg, oral, Daily, Nish Parnell MD, 100 mg at 10/03/23 0807    melatonin tablet 5 mg, 5 mg, oral, Nightly, Nish Parnell MD, 5 mg at 10/02/23 2135    nicotine (Nicoderm CQ) 21 mg/24 hr patch 1 patch, 1 patch, transdermal, Daily, Nish Parnell MD, 1 patch at 10/03/23 0802    nitroglycerin (Nitrostat) SL tablet 0.4 mg, 0.4 mg, sublingual, q5 min PRN, Nish Parnell MD    oxygen (O2) therapy, , inhalation, Continuous - 02/gases, Nish Parnell MD, Start at 10/01/23 0800    pantoprazole (ProtoNix) EC tablet 20 mg, 20 mg, oral, Daily before breakfast, Nish Parnell MD, 20 mg at 10/03/23 0616    perflutren lipid microspheres (Definity) injection 0.5 mL, 0.5 mL, intravenous, Once in imaging, Nish Parnell MD    perflutren protein A microsphere (Optison) injection 0.5 mL, 0.5 mL, intravenous, Once in imaging, Nish Parnell MD    sulfur hexafluoride microsphr (Lumason) injection 24.28 mg, 2 mL, intravenous, Once in imaging, Nish Parnell MD    ticagrelor (Brilinta) tablet 90 mg, 90 mg, oral, q12h, Nish Parnell MD, 90 mg at 10/03/23 0410    Physical Exam  Vitals:    10/03/23 0430 10/03/23 0500 10/03/23 0600 10/03/23 0700   BP: 150/68 154/67 153/65    BP Location:       Patient Position:       Pulse: 74 74 74 78   Resp:       Temp:       TempSrc:       SpO2:   98%    Weight:   76.2 kg (167 lb 15.9 oz)    Height:           Physical Exam  Constitutional:       General: She is not in acute distress.     Appearance: Normal appearance. She is not ill-appearing.   HENT:      Head: Normocephalic and atraumatic.      Mouth/Throat:      Mouth: Mucous membranes are moist.      Pharynx: Oropharynx is clear.   Eyes:      Extraocular Movements: Extraocular movements intact.      Pupils: Pupils are equal, round, and reactive to light.   Cardiovascular:       Rate and Rhythm: Normal rate and regular rhythm.      Pulses: Normal pulses.      Heart sounds: Normal heart sounds. No murmur heard.     No friction rub.   Pulmonary:      Effort: Pulmonary effort is normal. No respiratory distress.      Breath sounds: Normal breath sounds. No wheezing.   Abdominal:      General: There is no distension.      Palpations: Abdomen is soft. There is no mass.      Tenderness: There is no abdominal tenderness.   Musculoskeletal:         General: Normal range of motion.      Cervical back: Normal range of motion and neck supple.   Skin:     General: Skin is warm and dry.      Capillary Refill: Capillary refill takes less than 2 seconds.      Coloration: Skin is not jaundiced.      Findings: No bruising.   Neurological:      General: No focal deficit present.      Mental Status: She is alert and oriented to person, place, and time. Mental status is at baseline.         Objective      Intake/Output Summary (Last 24 hours) at 10/3/2023 0854  Last data filed at 10/3/2023 0600  Gross per 24 hour   Intake 1680 ml   Output 300 ml   Net 1380 ml         Admission on 10/01/2023   Component Date Value    WBC 10/01/2023 9.9     nRBC 10/01/2023 0.0     RBC 10/01/2023 4.98     Hemoglobin 10/01/2023 11.4 (L)     Hematocrit 10/01/2023 38.0     MCV 10/01/2023 76 (L)     MCH 10/01/2023 22.9 (L)     MCHC 10/01/2023 30.0 (L)     RDW 10/01/2023 16.2 (H)     Platelets 10/01/2023 331     MPV 10/01/2023 9.9     Neutrophils % 10/01/2023 67.1     Immature Granulocytes %,* 10/01/2023 0.7     Lymphocytes % 10/01/2023 20.6     Monocytes % 10/01/2023 6.9     Eosinophils % 10/01/2023 3.9     Basophils % 10/01/2023 0.8     Neutrophils Absolute 10/01/2023 6.63     Immature Granulocytes Ab* 10/01/2023 0.07     Lymphocytes Absolute 10/01/2023 2.03     Monocytes Absolute 10/01/2023 0.68     Eosinophils Absolute 10/01/2023 0.38     Basophils Absolute 10/01/2023 0.08     Glucose 10/01/2023 123 (H)     Sodium 10/01/2023  139     Potassium 10/01/2023 3.3 (L)     Chloride 10/01/2023 102     Bicarbonate 10/01/2023 25     Anion Gap 10/01/2023 15     Urea Nitrogen 10/01/2023 20     Creatinine 10/01/2023 0.87     eGFR 10/01/2023 74     Calcium 10/01/2023 9.8     Albumin 10/01/2023 4.3     Alkaline Phosphatase 10/01/2023 50     Total Protein 10/01/2023 8.3 (H)     AST 10/01/2023 28     Bilirubin, Total 10/01/2023 0.3     ALT 10/01/2023 22     Magnesium 10/01/2023 1.70     Coronavirus 2019, PCR 10/01/2023 Not Detected     D-Dimer Non VTE, Quant (* 10/01/2023 690 (H)     Troponin I, High Sensiti* 10/01/2023 210 (HH)     Troponin I, High Sensiti* 10/01/2023 454 (HH)     Ventricular Rate 10/01/2023 68     Atrial Rate 10/01/2023 68     OK Interval 10/01/2023 144     QRS Duration 10/01/2023 88     QT Interval 10/01/2023 440     QTC Calculation(Bazett) 10/01/2023 467     P Axis 10/01/2023 51     R Axis 10/01/2023 -41     T Axis 10/01/2023 72     QRS Count 10/01/2023 11     Q Onset 10/01/2023 212     P Onset 10/01/2023 140     P Offset 10/01/2023 195     T Offset 10/01/2023 432     QTC Fredericia 10/01/2023 459     WBC 10/01/2023 8.8     nRBC 10/01/2023 0.0     RBC 10/01/2023 3.95 (L)     Hemoglobin 10/01/2023 8.9 (L)     Hematocrit 10/01/2023 30.4 (L)     MCV 10/01/2023 77 (L)     MCH 10/01/2023 22.5 (L)     MCHC 10/01/2023 29.3 (L)     RDW 10/01/2023 16.1 (H)     Platelets 10/01/2023 226     MPV 10/01/2023 9.8     Glucose 10/01/2023 92     Sodium 10/01/2023 139     Potassium 10/01/2023 3.5     Chloride 10/01/2023 106     Bicarbonate 10/01/2023 27     Anion Gap 10/01/2023 10     Urea Nitrogen 10/01/2023 17     Creatinine 10/01/2023 0.69     eGFR 10/01/2023 >90     Calcium 10/01/2023 8.6     Phosphorus 10/01/2023 4.4     Albumin 10/01/2023 3.4     Cholesterol 10/01/2023 158     HDL-Cholesterol 10/01/2023 28.7     Cholesterol/HDL Ratio 10/01/2023 5.5     LDL Calculated 10/01/2023 92 (L)     VLDL 10/01/2023 38     Triglycerides 10/01/2023 189 (H)      Non HDL Cholesterol 10/01/2023 129     Hemoglobin A1C 10/01/2023 6.2 (H)     Estimated Average Glucose 10/01/2023 131     Protime 10/01/2023 12.5     INR 10/01/2023 1.1     aPTT 10/01/2023 51 (H)     LV A4C EF 10/01/2023 72.4     Troponin I, High Sensiti* 10/01/2023 1,105 ()     Troponin I, High Sensiti* 10/01/2023 1,609 (HH)     Troponin I, High Sensiti* 10/02/2023 1,214 (HH)     Ventricular Rate 10/02/2023 78     Atrial Rate 10/02/2023 78     MS Interval 10/02/2023 134     QRS Duration 10/02/2023 88     QT Interval 10/02/2023 410     QTC Calculation(Bazett) 10/02/2023 467     P Axis 10/02/2023 50     R Axis 10/02/2023 -61     T Axis 10/02/2023 83     QRS Count 10/02/2023 13     Q Onset 10/02/2023 213     P Onset 10/02/2023 146     P Offset 10/02/2023 196     T Offset 10/02/2023 418     QTC Fredericia 10/02/2023 447     Troponin I, High Sensiti* 10/02/2023 837 (HH)     WBC 10/02/2023 8.6     nRBC 10/02/2023 0.0     RBC 10/02/2023 4.13     Hemoglobin 10/02/2023 9.2 (L)     Hematocrit 10/02/2023 31.5 (L)     MCV 10/02/2023 76 (L)     MCH 10/02/2023 22.3 (L)     MCHC 10/02/2023 29.2 (L)     RDW 10/02/2023 16.3 (H)     Platelets 10/02/2023 232     MPV 10/02/2023 9.6     Neutrophils % 10/02/2023 76.2     Immature Granulocytes %,* 10/02/2023 0.3     Lymphocytes % 10/02/2023 13.3     Monocytes % 10/02/2023 6.1     Eosinophils % 10/02/2023 3.6     Basophils % 10/02/2023 0.5     Neutrophils Absolute 10/02/2023 6.56     Immature Granulocytes Ab* 10/02/2023 0.03     Lymphocytes Absolute 10/02/2023 1.15 (L)     Monocytes Absolute 10/02/2023 0.53     Eosinophils Absolute 10/02/2023 0.31     Basophils Absolute 10/02/2023 0.04     Glucose 10/02/2023 98     Sodium 10/02/2023 140     Potassium 10/02/2023 3.3 (L)     Chloride 10/02/2023 108 (H)     Bicarbonate 10/02/2023 24     Anion Gap 10/02/2023 11     Urea Nitrogen 10/02/2023 9     Creatinine 10/02/2023 0.49 (L)     eGFR 10/02/2023 >90     Calcium 10/02/2023 8.7      Magnesium 10/02/2023 1.51 (L)     Phosphorus 10/02/2023 3.6     WBC 10/03/2023 9.5     nRBC 10/03/2023 0.0     RBC 10/03/2023 3.92 (L)     Hemoglobin 10/03/2023 8.9 (L)     Hematocrit 10/03/2023 30.2 (L)     MCV 10/03/2023 77 (L)     MCH 10/03/2023 22.7 (L)     MCHC 10/03/2023 29.5 (L)     RDW 10/03/2023 16.6 (H)     Platelets 10/03/2023 230     MPV 10/03/2023 9.9     Glucose 10/03/2023 93     Sodium 10/03/2023 140     Potassium 10/03/2023 3.2 (L)     Chloride 10/03/2023 108 (H)     Bicarbonate 10/03/2023 23     Anion Gap 10/03/2023 12     Urea Nitrogen 10/03/2023 16     Creatinine 10/03/2023 0.66     eGFR 10/03/2023 >90     Calcium 10/03/2023 8.5 (L)     Magnesium 10/03/2023 1.50 (L)             Transthoracic Echo (TTE) Complete    Result Date: 10/1/2023          [place] [id number]  Tel [phone] Fax [phone] TRANSTHORACIC ECHOCARDIOGRAM REPORT  Patient Name:      EH Walker Physician:    [id number] Norman Franklin DO Study Date:        10/1/2023            Ordering Provider:    [id number] BENOIT MRN/PID:           [id number]             Fellow: Accession#:        [id number]         Nurse: Date of Birth/Age: 1960 / 63 years Sonographer:          Lata Wilson RDCS Gender:            F                    Additional Staff: Height:            153.00 cm            Admit Date:           9/30/2023 Weight:            79.01 kg             Admission Status:     Inpatient -                                                               Routine BSA:               1.77 m2              Department Location:  [facility] Blood Pressure: 175 /68 mmHg Study Type:    TRANSTHORACIC ECHO (TTE) COMPLETE Diagnosis/ICD: ST elevation (STEMI) myocardial infarction of  unspecified                site-I21.3 Indication:    ACUTE MI Patient History: Smoker:            Current. Pertinent History: CAD, HTN and Hyperlipidemia. Study Detail: The following Echo studies were performed: 2D, M-Mode, color flow               and Doppler. Technically challenging study due to body habitus.  PHYSICIAN INTERPRETATION: Left Ventricle: Left ventricular systolic function is normal, with an estimated ejection fraction of 60%. There are no regional wall motion abnormalities. The left ventricular cavity size is normal. There is mild to moderate concentric left ventricular hypertrophy. Spectral Doppler shows an impaired relaxation pattern of left ventricular diastolic filling. LV Wall Scoring: All segments are normal. Left Atrium: The left atrium is normal in size. Right Ventricle: The right ventricle is normal in size. There is normal right ventricular global systolic function. Right Atrium: The right atrium is normal in size. Aortic Valve: The aortic valve appears structurally normal. The aortic valve appears tricuspid. There is mild aortic valve cusp calcification. There is evidence of mild aortic valve stenosis. There is no evidence of aortic valve regurgitation. The peak instantaneous gradient of the aortic valve is 25.0 mmHg. The mean gradient of the aortic valve is 14.0 mmHg. Mitral Valve: The mitral valve was not well visualized. There is evidence of mild mitral valve stenosis. The doppler estimated mean and peak diastolic pressure gradients are 4.5 mmHg and 11.1 mmHg respectively. There is mildly decreased anterior and posterior mitral valve leaflet mobility. There is moderate mitral annular calcification. There is mild mitral valve regurgitation. Tricuspid Valve: The tricuspid valve is structurally normal. There is normal tricuspid valve leaflet mobility. There is mild tricuspid regurgitation. Pulmonic Valve: The pulmonic valve is structurally normal. There is no indication of pulmonic valve  regurgitation. Pericardium: There is a trivial pericardial effusion. The effusion is circumferential. Aorta: The aortic root is normal. Pulmonary Artery: The main pulmonary artery is normal in size, and position, with normal bifurcation into the left and right pulmonary arteries. Systemic Veins: The inferior vena cava appears to be of normal size. In comparison to the previous echocardiogram(s): There are no prior studies on this patient for comparison purposes.  CONCLUSIONS:  1. Left ventricular systolic function is normal with a 60% estimated ejection fraction.  2. Spectral Doppler shows an impaired relaxation pattern of left ventricular diastolic filling.  3. There is evidence of mild mitral valve stenosis.  4. There is moderate mitral annular calcification.  5. Mild tricuspid regurgitation is visualized.  6. Mild aortic valve stenosis.  7. The main pulmonary artery is normal in size, and position, with normal bifurcation into the left and right pulmonary arteries. RECOMMENDATIONS: Technically suboptimal and limited study, therefore accuracy of above interpretation could be substantially diminished. Clinical correlation is advised. Consider additional imaging modalities if clinically indicated.  QUANTITATIVE DATA SUMMARY: 2D MEASUREMENTS:                          Normal Ranges: IVSd:          1.23 cm   (0.6-1.1cm) LVPWd:         1.30 cm   (0.6-1.1cm) LVIDd:         3.80 cm   (3.9-5.9cm) LVIDs:         2.57 cm LV Mass Index: 94.0 g/m2 LV % FS        32.4 % LA VOLUME:                               Normal Ranges: LA Vol A4C:        41.5 ml    (22+/-6mL/m2) LA Vol A2C:        49.1 ml LA Vol BP:         47.4 ml LA Vol Index A4C:  23.5ml/m2 LA Vol Index A2C:  27.8 ml/m2 LA Vol Index BP:   26.9 ml/m2 LA Area A4C:       15.3 cm2 LA Area A2C:       17.5 cm2 LA Major Axis A4C: 4.8 cm LA Major Axis A2C: 5.3 cm LA Volume Index:   24.9 ml/m2 RA VOLUME BY A/L METHOD:                               Normal Ranges: RA Vol A4C:         20.2 ml    (8.3-19.5ml) RA Vol Index A4C:  11.5 ml/m2 RA Area A4C:       9.8 cm2 RA Major Axis A4C: 4.0 cm LV SYSTOLIC FUNCTION BY 2D PLANIMETRY (MOD):                     Normal Ranges: EF-A4C View: 72.4 % (>=55%) EF-A2C View: 64.1 % EF-Biplane:  68.0 % LV DIASTOLIC FUNCTION:                        Normal Ranges: MV Peak E:    1.21 m/s (0.7-1.2 m/s) MV Peak A:    1.24 m/s (0.42-0.7 m/s) E/A Ratio:    0.98     (1.0-2.2) MV e'         0.03 m/s (>8.0) MV lateral e' 0.03 m/s MV medial e'  0.06 m/s E/e' Ratio:   34.77    (<8.0) MITRAL VALVE:                       Normal Ranges: MV Vmax:    1.66 m/s  (<=1.3m/s) MV peak P.1 mmHg (<5mmHg) MV mean P.5 mmHg  (<48mmHg) MV DT:      290 msec  (150-240msec) AORTIC VALVE:                                    Normal Ranges: AoV Vmax:                2.50 m/s  (<=1.7m/s) AoV Peak P.0 mmHg (<20mmHg) AoV Mean P.0 mmHg (1.7-11.5mmHg) LVOT Max Jim:            1.47 m/s  (<=1.1m/s) AoV VTI:                 56.40 cm  (18-25cm) LVOT VTI:                35.60 cm LVOT Diameter:           2.00 cm   (1.8-2.4cm) AoV Area, VTI:           1.98 cm2  (2.5-5.5cm2) AoV Area,Vmax:           1.85 cm2  (2.5-4.5cm2) AoV Dimensionless Index: 0.63  RIGHT VENTRICLE: RV Basal 2.86 cm RV Mid   2.16 cm RV Major 7.0 cm TAPSE:   27.3 mm RV s'    0.10 m/s TRICUSPID VALVE/RVSP:                             Normal Ranges: Peak TR Velocity: 1.95 m/s RV Syst Pressure: 18.2 mmHg (< 30mmHg) PULMONIC VALVE:                         Normal Ranges: PV Accel Time: 108 msec (>120ms) PV Max Jim:    1.3 m/s  (0.6-0.9m/s) PV Max P.4 mmHg  [id number] Norman Franklin DO Electronically signed on 10/1/2023 at 3:28:59 PM  Wall Scoring  ** Final **      Cardiac catheterization - coronary     [facility], Cath Lab         [address] [place] [address]    Cardiovascular Catheterization Report    Patient Name:      EH ONTIVEROS        Performing Physician: [id number]  Ricardo Padron MD  Study Date:        10/2/2023            Verifying Physician:  [id number] Ricardo Padron MD  MRN/PID:           [id number]             Cardiologist:  Accession#:        [id number]         Ordering Provider:    [id number] RICARDO BENOIT  Date of Birth/Age: 1960 / 63 years Fellow:  Gender:            F                    Fellow:  Encounter#:        [id number]       Study:            PCI - Percutaneous Coronary Intervention  Additional Study: IVUS - Intravascular Ultrasound       Indications:  EH ONTIVEROS is a 63 year old female who presents with hypertension, STEMI status post LCx primary PCI, now presenting for staged intervention of RCA bystander disease for complete revascularization. and a chest pain assessment of typical angina. Acute coronary syndrome - STEMI.  Stress test performed: No. CTA performed: NoJose De Leon accessed: No. LVEF  Assessed: No.  Cardiac arrest: No.  Cardiac surgical consult: No.  Cardiovascular Instability: No  Frailty status of patient entering lab: 4 = Vulnerable.       Procedure Description:  After infiltration with 2% Lidocaine, the right radial artery was cannulated with a modified Seldinger technique. Subsequently a 5/6 slender sheath was placed in the right radial artery.     Coronary Angiography:  The coronary circulation is right dominant.     Right Coronary Artery Distribution:    The right coronary artery is a medium-sized caliber vessel. The RCA arises normally from the right sinus of Valsalva. The RCA showed severe ostial atherosclerotic disease and severe mid-segment atherosclerotic disease. The ostial right coronary  artery showed 85% stenosis. This lesion was calcified. An additional RCA lesion, located at the mid right coronary artery, revealed 85% stenosis.     Coronary Interventions:  Angiography reveals a 85% stenosis of the ostial and proximal to mid right coronary artery coronary artery. Pre-intervention URBI flow was 3. Intravascular Ultrasound (IVUS) was performed within the ostial and proximal to mid right coronary artery. This lesion was interpreted to be significant. . The reference lumen diameter was 3.00 mm. Percutaneous coronary intervention was performed within the ostial and proximal to mid right coronary artery. The vessel was pre-dilated using a compliant balloon 2.5 mm x 12 mm at 12 CHRISTA. [place] Leeroy drug-eluting stent 3.0 mm x 22 mm was advanced to the lesion and implanted at 12 CHRISTA. A second [place] Leeroy drug-eluting stent 3.0 mm x 15 mm was implanted in overlap at 12 CHRISAT. A third [place] Leeroy drug-eluting stent 3.5 mm x 8 mm was implanted in overlap at 12 CHRISTA. The stent was post dilated using a non-compliant balloon 3.0 mm x 12 mm at 20 CHRISTA. Additional dilatation was performed using a non-compliant balloon 3.5 mm x 12 mm at 24 CHRISTA. The stenosis   was successfully reduced from 85% to 50%. Post intervention Intravascular Ultrasound (IVUS) was performed within the ostial and proximal to mid right coronary artery . The stent demonstrated good apposition. No thrombus was visualized. No edge dissection is visualized. Post-intervention RUBI flow was 3.     Coronary Intervention Comments:  After initial predilation we first tried to advance a 3.0 x 38 mm drug-eluting stent to come from the mid to ostial lesions. However given the significant tortuosity the stent would not cross. Next we used a guide liner to able to advance a shorter 3.0 x 22 mm CARLOS ALBERTO in the midsegment, and a 3.0 x 15 mm CARLOS ALBERTO in the proximal segment. There was an apparent residual stenosis on angiogram. We then performed IVUS that showed that we  either missed the ostium of the RCA or the stent was severely underexpanded. Given concern we may have missed the ostium of the RCA we placed an additional 3.0 x 8 mm drug-eluting stent just at the ostium of the RCA with some struts prolapsing into the aorta. We then performed high-pressure balloon inflation with a 3.5 x 12 mm NC balloon. This appeared to improve significantly the diameter of the vessel by angiography. Next we performed IVUS that showed that stent was better expanded, but there was some residual 50% stenosis in this segment. Plan is to bring patient   back soon as outpatient for shockwave of the ostium of the RCA to treat underexpanded stent.     Coronary Lesion Summary:  Vessel   Stenosis   Vessel Segment  RCA    85% stenosis     ostial  RCA    85% stenosis      mid       Hemodynamic Pressures:     +----+--------------------+----------+-------------+--------------+---------+  Site     Date Time      Phase NameSystolic mmHgDiastolic mmHgMean mmHg  +----+--------------------+----------+-------------+--------------+---------+    AO10/2/2023 3:16:30 PM  AIR REST           95            59       76  +----+--------------------+----------+-------------+--------------+---------+    AO10/2/2023 3:19:20 PM  AIR REST           91            53       71  +----+--------------------+----------+-------------+--------------+---------+    AO10/2/2023 3:44:52 PM  AIR REST          123            87      107  +----+--------------------+----------+-------------+--------------+---------+    AO10/2/2023 4:15:02 PM  AIR REST          124            90      107  +----+--------------------+----------+-------------+--------------+---------+       Complications:  No in-lab complications observed.     Cardiac Cath Post Procedure Notes:  Post Procedure Diagnosis: CARLOS ALBERTO of RCA.  Blood Loss:               Estimated blood loss during the procedure was 10 mls.  Specimens Removed:        Number of  specimen(s) removed: none.       Recommendations:  Maximize medical therapy.    ____________________________________________________________________________________  CONCLUSIONS:   1. IVUS guided PCI to mid to proximal RCA stenosis.    ICD 10 Codes:  ST elevation (STEMI) myocardial infarction involving left circumflex coronary artery-I21.21     [id number] Nish Padron MD  Performing Physician  Electronically signed by [id number] Nish Padron MD on 10/2/2023 at  7:00:36 PM         ** Final **  Electrocardiogram 12 Lead  Normal sinus rhythm  Left anterior fascicular block  Nonspecific ST abnormality  Abnormal ECG  When compared with ECG of 01-OCT-2023 06:34,  No significant change was found  Confirmed by Charleen Hogan ([id number]) on 10/2/2023 8:57:04 AM        Assessment/Plan:    I am currently managing this critically ill patient for:  STEMI  HTN, HLD  GERD  Anxiety    Daily Plan:    #STEMI  C 10/1 with 99% stenosis OM s/p 1x CARLOS ALBERTO, 99% stenosis Cx s/p 1x CARLOS ALBERTO, 3x CARLOS ALBERTO to RCA on 10/2  -Telemetry monitoring  -Echocardiogram with EF 60%, mild MV stenosis, mild TV regurgitation  -Continue DAPT with aspirin, Brilinta  -Continue Carvedilol 12.5mg twice daily  -Continue statin, vascepa  -Plan for further angioplasty as outpatient    #HTN, HLD  -Statin, Vascepa  -Carvedilol, Losartan    #GERD  -Protonix    #Anxiety  -Prozac      DVT Prophylaxis: Holding  GI Prophylaxis: Not indicated  Diet: NPO, advance after LHC  CVC: None  [place]: None  [place]: None  Restraints: None  Code Status: Full Code  Dispo: Stable for discharge from ICU standpoint.       Critical Care Time:  35

## 2023-10-03 NOTE — PROGRESS NOTES
Subjective Data:  Patient sitting up in bed stable.  Denies any chest pain shortness of breath or other complaints.  Tolerating medication.  Remaining in sinus rhythm.  Vital signs are display hypertension.  Patient awaiting follow-up catheterization and angioplasty today with Dr. Mendes.    Overnight Events:    No specific overnight events of concern.     Objective Data:  Last Recorded Vitals:  Vitals:    10/03/23 0600 10/03/23 0700 10/03/23 0800 10/03/23 0900   BP: 153/65  151/67 123/56   BP Location:       Patient Position:       Pulse: 74 78 74 76   Resp:       Temp:       TempSrc:       SpO2: 98%   99%   Weight: 76.2 kg (167 lb 15.9 oz)      Height:           Last Labs:  CBC - 10/3/2023:  4:33 AM  9.5 8.9 230    30.2      CMP - 10/3/2023:  4:33 AM  8.5 8.3 28 --- 0.3   3.6 3.4 22 50      PTT - 10/1/2023:  4:37 AM  1.1   12.5 51     TROPHS   Date/Time Value Ref Range Status   10/02/2023 08:05  0 - 13 ng/L Final   10/02/2023 12:27 AM 1,214 0 - 13 ng/L Final     Comment:     Previous result verified on 10/1/2023 0924 on specimen/case [id number] called with component [facility] for procedure Troponin I, High Sensitivity Every 8 hours with value 1,105 ng/L.   10/01/2023 03:50 PM 1,609 0 - 13 ng/L Final     Comment:     Previous result verified on 10/1/2023 0924 on specimen/case [id number] called with component [facility] for procedure Troponin I, High Sensitivity Every 8 hours with value 1,105 ng/L.     HGBA1C   Date/Time Value Ref Range Status   10/01/2023 04:37 AM 6.2 see below % Final   08/08/2022 02:49 PM 6.0 % Final     Comment:          Diagnosis of Diabetes-Adults   Non-Diabetic: < or = 5.6%   Increased risk for developing diabetes: 5.7-6.4%   Diagnostic of diabetes: > or = 6.5%  .       Monitoring of Diabetes                Age (y)     Therapeutic Goal (%)   Adults:          >18           <7.0   Pediatrics:    13-18           <7.5                   7-12           <8.0                   0- 6             7.5-8.5   American Diabetes Association. Diabetes Care 33(S1), Jan 2010.       LDLCALC   Date/Time Value Ref Range Status   10/01/2023 04:37 AM 92 140 - 190 mg/dL Final     Comment:                                 Near   Borderline      AGE      Desirable  Optimal    High     High     Very High     0-19 Y     0 - 109     ---    110-129   >/= 130     ----    20-24 Y     0 - 119     ---    120-159   >/= 160     ----      >24 Y     0 -  99   100-129  130-159   160-189     >/=190       VLDL   Date/Time Value Ref Range Status   10/01/2023 04:37 AM 38 0 - 40 mg/dL Final   06/20/2022 11:56 AM 39 0 - 40 mg/dL Final   06/02/2021 03:04 PM 44 0 - 40 mg/dL Final   02/16/2021 01:35 PM 48 0 - 40 mg/dL Final      Last I/O:  I/O last 3 completed shifts:  In: 3407 (44.7 mL/kg) [P.O.:200; I.V.:3207 (42.1 mL/kg)]  Out: 300 (3.9 mL/kg) [Urine:300 (0.1 mL/kg/hr)]  Weight: 76.2 kg     Past Cardiology Tests (Last 3 Years):  EKG:  Normal sinus rhythm with nonspecific ST-T wave changes     Interpreting Physicians  Performing Staff   Charleen Hogan MD No performing staff assigned to study.     Indications  Priority: Routine  Post PCI   Comments: Post PCI on arrival to recovery area.     Interpretation Summary    Normal sinus rhythm  Left anterior fascicular block  Nonspecific ST abnormality  Abnormal ECG  When compared with ECG of 01-OCT-2023 06:34,  No significant change was found  Confirmed by Charleen Hogan ([id number]) on 10/2/2023 8:57:04 AM  Measurements    P Axis 50 degrees         P Offset 196 ms         SD Interval 134 ms         Q Onset 213 ms         QTC Calculation(Bazett) 467 ms         QTC Fredericia 447 ms         R Axis -61 degrees                     Echo:  Height: 1.53 m   Weight: 79.8 kg   Blood Pressure: 171/70    Date of Study: 10/1/23   Ordering Provider: Nish Parnell MD   Clinical Indications: None Listed         Interpreting Physicians  Performing Staff   Norman Franklin DO Tech: Lata Wilson, RT         Indications  Priority: Routine  Dx: STEMI (ST elevation myocardial infarction) (CMS/[facility]) [I21.3 (ICD-10-CM)]     PACS Images     Show images for Transthoracic Echo (TTE) Complete  Interpretation Summary              [place] [address]   Tel [phone] Fax [phone]     TRANSTHORACIC ECHOCARDIOGRAM REPORT        Patient Name:      EH ONTIVEROS        Aaron Physician:    [id number] Norman Franklin DO  Study Date:        10/1/2023            Ordering Provider:    [id number] RICARDO BENOIT  MRN/PID:           [id number]             Fellow:  Accession#:        [id number]         Nurse:  Date of Birth/Age: 1960 / 63 years Sonographer:          Lata Wilson RDCS  Gender:            F                    Additional Staff:  Height:            153.00 cm            Admit Date:           9/30/2023  Weight:            79.01 kg             Admission Status:     Inpatient -                                                                Routine  BSA:               1.77 m2              Department Location:  [facility]  Blood Pressure: 175 /68 mmHg     Study Type:    TRANSTHORACIC ECHO (TTE) COMPLETE  Diagnosis/ICD: ST elevation (STEMI) myocardial infarction of unspecified                 site-I21.3  Indication:    ACUTE MI     Patient History:  Smoker:            Current.  Pertinent History: CAD, HTN and Hyperlipidemia.     Study Detail: The following Echo studies were performed: 2D, M-Mode, color flow                and Doppler. Technically challenging study due to body habitus.        PHYSICIAN INTERPRETATION:  Left Ventricle: Left ventricular systolic function is normal, with an estimated ejection fraction of 60%. There are no regional wall motion abnormalities. The left  ventricular cavity size is normal. There is mild to moderate concentric left ventricular hypertrophy. Spectral Doppler shows an impaired relaxation pattern of left ventricular diastolic filling.  LV Wall Scoring:  All segments are normal.     Left Atrium: The left atrium is normal in size.  Right Ventricle: The right ventricle is normal in size. There is normal right ventricular global systolic function.  Right Atrium: The right atrium is normal in size.  Aortic Valve: The aortic valve appears structurally normal. The aortic valve appears tricuspid. There is mild aortic valve cusp calcification. There is evidence of mild aortic valve stenosis.  There is no evidence of aortic valve regurgitation. The peak instantaneous gradient of the aortic valve is 25.0 mmHg. The mean gradient of the aortic valve is 14.0 mmHg.  Mitral Valve: The mitral valve was not well visualized. There is evidence of mild mitral valve stenosis. The doppler estimated mean and peak diastolic pressure gradients are 4.5 mmHg and 11.1 mmHg respectively. There is mildly decreased anterior and posterior mitral valve leaflet mobility. There is moderate mitral annular calcification. There is mild mitral valve regurgitation.  Tricuspid Valve: The tricuspid valve is structurally normal. There is normal tricuspid valve leaflet mobility. There is mild tricuspid regurgitation.  Pulmonic Valve: The pulmonic valve is structurally normal. There is no indication of pulmonic valve regurgitation.  Pericardium: There is a trivial pericardial effusion. The effusion is circumferential.  Aorta: The aortic root is normal.  Pulmonary Artery: The main pulmonary artery is normal in size, and position, with normal bifurcation into the left and right pulmonary arteries.  Systemic Veins: The inferior vena cava appears to be of normal size.  In comparison to the previous echocardiogram(s): There are no prior studies on this patient for comparison purposes.         CONCLUSIONS:   1. Left ventricular systolic function is normal with a 60% estimated ejection fraction.   2. Spectral Doppler shows an impaired relaxation pattern of left ventricular diastolic filling.   3. There is evidence of mild mitral valve stenosis.   4. There is moderate mitral annular calcification.   5. Mild tricuspid regurgitation is visualized.   6. Mild aortic valve stenosis.   7. The main pulmonary artery is normal in size, and position, with normal bifurcation into the left and right pulmonary arteries.     RECOMMENDATIONS:  Technically suboptimal and limited study, therefore accuracy of above interpretation could be substantially diminished. Clinical correlation is advised. Consider additional imaging modalities if clinically indicated.    Ejection Fractions:  Pending  Cath:  Verbal report of multivessel disease with circumflex and OM branch intervention and stents yesterday acutely.  Residual RCA occlusion to be intervened upon tomorrow by Dr. Mendes.  [facility], Cath Lab         [address] [place] [address]     Cardiovascular Catheterization Report     Patient Name:      EH ONTIVEROS        Performing Physician: [id number] Padron MD  Study Date:        10/2/2023            Verifying Physician:  Ronaldo Padron MD  MRN/PID:           [id number]             Cardiologist:  Accession#:        [id number]         Ordering Provider:    Ronaldo BENOIT  Date of Birth/Age: 1960 / 63 years Fellow:  Gender:            F                    Fellow:  Encounter#:        [id number]        Study:            PCI - Percutaneous  Coronary Intervention  Additional Study: IVUS - Intravascular Ultrasound        Indications:  EH ONTIVEROS is a 63 year old female who presents with hypertension, STEMI status post LCx primary PCI, now presenting for staged intervention of RCA bystander disease for complete revascularization. and a chest pain assessment of typical angina. Acute coronary syndrome - STEMI.  Stress test performed: No. CTA performed: NoJose De Leon accessed: No. LVEF  Assessed: No.  Cardiac arrest: No.  Cardiac surgical consult: No.  Cardiovascular Instability: No  Frailty status of patient entering lab: 4 = Vulnerable.        Procedure Description:  After infiltration with 2% Lidocaine, the right radial artery was cannulated with a modified Seldinger technique. Subsequently a 5/6 slender sheath was placed in the right radial artery.     Coronary Angiography:  The coronary circulation is right dominant.     Right Coronary Artery Distribution:     The right coronary artery is a medium-sized caliber vessel. The RCA arises normally from the right sinus of Valsalva. The RCA showed severe ostial atherosclerotic disease and severe mid-segment atherosclerotic disease. The ostial right coronary artery showed 85% stenosis. This lesion was calcified. An additional RCA lesion, located at the mid right coronary artery, revealed 85% stenosis.     Coronary Interventions:  Angiography reveals a 85% stenosis of the ostial and proximal to mid right coronary artery coronary artery. Pre-intervention RUBI flow was 3. Intravascular Ultrasound (IVUS) was performed within the ostial and proximal to mid right coronary artery. This lesion was interpreted to be significant. . The reference lumen diameter was 3.00 mm. Percutaneous coronary intervention was performed within the ostial and proximal to mid right coronary artery. The vessel was pre-dilated using a compliant balloon 2.5 mm x 12 mm at 12 CHRISTA. [place] drug-eluting stent 3.0 mm x 22 mm was advanced  to the lesion and implanted at 12 CHRISTA. A second [place] drug-eluting stent 3.0 mm x 15 mm was implanted in overlap at 12 CHRISTA. A third [place] drug-eluting stent 3.5 mm x 8 mm was implanted in overlap at 12 CHRISTA. The stent was post dilated using a non-compliant balloon 3.0 mm x 12 mm at 20 CHRISTA. Additional dilatation was performed using a non-compliant balloon 3.5 mm x 12 mm at 24 CHRISTA. The stenosis   was successfully reduced from 85% to 50%. Post intervention Intravascular Ultrasound (IVUS) was performed within the ostial and proximal to mid right coronary artery . The stent demonstrated good apposition. No thrombus was visualized. No edge dissection is visualized. Post-intervention RUBI flow was 3.     Coronary Intervention Comments:  After initial predilation we first tried to advance a 3.0 x 38 mm drug-eluting stent to come from the mid to ostial lesions. However given the significant tortuosity the stent would not cross. Next we used a guide liner to able to advance a shorter 3.0 x 22 mm CARLOS ALBERTO in the midsegment, and a 3.0 x 15 mm CARLOS ALBERTO in the proximal segment. There was an apparent residual stenosis on angiogram. We then performed IVUS that showed that we either missed the ostium of the RCA or the stent was severely underexpanded. Given concern we may have missed the ostium of the RCA we placed an additional 3.0 x 8 mm drug-eluting stent just at the ostium of the RCA with some struts prolapsing into the aorta. We then performed high-pressure balloon inflation with a 3.5 x 12 mm NC balloon. This appeared to improve significantly the diameter of the vessel by angiography. Next we performed IVUS that showed that stent was better expanded, but there was some residual 50% stenosis in this segment. Plan is to bring patient   back soon as outpatient for shockwave of the ostium of the RCA to treat underexpanded stent.     Coronary Lesion Summary:  Vessel   Stenosis   Vessel Segment  RCA    85% stenosis      ostial  RCA    85% stenosis      mid    Stress Test:  Nuclear Stress Test 2/13/2023            Inpatient Medications:  Scheduled medications   Medication Dose Route Frequency    aspirin  81 mg oral Daily    atorvastatin  80 mg oral Once    carvedilol  25 mg oral BID    cholecalciferol  25 mcg oral Daily    FLUoxetine  20 mg oral Daily    hydroCHLOROthiazide  12.5 mg oral Daily    icosapent ethyL  2 g oral BID with meals    losartan  100 mg oral Daily    melatonin  5 mg oral Nightly    nicotine  1 patch transdermal Daily    oxygen   inhalation Continuous - 02/gases    pantoprazole  20 mg oral Daily before breakfast    perflutren lipid microspheres  0.5 mL intravenous Once in imaging    perflutren protein A microsphere  0.5 mL intravenous Once in imaging    sulfur hexafluoride microsphr  2 mL intravenous Once in imaging    ticagrelor  90 mg oral q12h     PRN medications   Medication    hydrALAZINE    hydrOXYzine HCL    nitroglycerin     Continuous Medications   Medication Dose Last Rate     Patient Active Problem List   Diagnosis    HTN (hypertension)    ST elevation myocardial infarction (STEMI), unspecified artery (CMS/HCC)    Depressive disorder    Gastroesophageal reflux disease    Hyperlipidemia    NAFLD (nonalcoholic fatty liver disease)    Nicotine dependence    Thyroid nodule        Physical Exam:  Constitutional:       General: Awake.      Appearance: Normal appearance. Well-developed and well-groomed.   Eyes:      Conjunctiva/sclera: Conjunctivae normal.      Pupils: Pupils are equal, round, and reactive to light.   HENT:      Head: Normocephalic and atraumatic.      Ears: Tympanic membranes normal.      Nose: Nose normal.    Mouth/Throat:      Dentition: Normal.      Pharynx: Oropharynx is clear.   Neck:      Thyroid: Thyroid normal.   Pulmonary:      Effort: Pulmonary effort is normal.      Breath sounds: Normal breath sounds.   Cardiovascular:      PMI at left midclavicular line. Normal rate. Regular  rhythm. Normal S1. Normal S2.       Murmurs: There is no murmur.      No gallop.  No click. No rub.   Pulses:     Intact distal pulses.   Edema:     Peripheral edema absent.   Abdominal:      General: Bowel sounds are normal.      Palpations: Abdomen is soft.   Musculoskeletal: Normal range of motion.      Cervical back: Normal range of motion and neck supple. Skin:     General: Skin is warm and dry.   Neurological:      Mental Status: Alert and oriented to person, place and time.      Cranial Nerves: Cranial nerves 2-12 are intact.      Motor: Motor function is intact.      Gait: Gait is intact.      Deep Tendon Reflexes: Reflexes are normal and symmetric.   Psychiatric:         Behavior: Behavior is cooperative.           Assessment/Plan   63-year-old female seen and evaluated at the bedside in the medical intensive care unit post myocardial infarction day #1.    Bedside examination evaluation were performed by me.    Chart was reviewed in detail including lab, EKG, x-rays and other data available and discussed with the patient and staff.  Also discussed with Dr. Mendes who was the performing interventional list yesterday.    Impression and plan as noted.    At the present time the patient is stable.  Patient is free of any angina.  Vital signs show some mild hypertension.  Planning to add losartan 50 and increase Coreg to 12 and half twice a day.  We will adjust medications accordingly.  Patient may be scheduled to go to the eighth floor telemetry unit for the remaining part of the day and is scheduled tomorrow for RCA intervention.  We will adjust medications.  We will continue to follow labs and EKGs.    October 2, 2023:  Patient seen evaluate the bedside in the telemetry unit post MI day 2.    Bedside examination evaluation were performed by me.    Chart was reviewed in detail including lab, EKG, x-rays, echocardiogram and other data available discussed with the patient staff and family.    Impression and plan  as noted.    Patient is doing well.  She is still hypertensive.  We will be adjusting medications accordingly by increasing losartan.  She is scheduled today for follow-up PCI and stenting by Dr. Mendes.  This will be done later today.  Disposition to follow.  We will continue to observe.    Patient Active Problem List   Diagnosis    HTN (hypertension)    ST elevation myocardial infarction (STEMI), unspecified artery (CMS/HCC)    Depressive disorder    Gastroesophageal reflux disease    Hyperlipidemia    NAFLD (nonalcoholic fatty liver disease)    Nicotine dependence    Thyroid nodule      October 3, 2023:  Patient seen evaluated bedside in the telemetry unit.    Bedside examination evaluation and further conversation and assessment were performed by me.    Chart was reviewed in detail discussed with the patient staff and the attending interventionalist Dr. Mendes.    Impression and plan as noted.    Patient is doing well.  She underwent successful PTCA and stentingOf the right coronary artery which is a complex lesion.  Reports are in the chart.  Patient may be returning in a week for some additional PTCA to further expand the vessel per Dr. Mendes.  Current meds will continue.  She can be discharged later today.  Arrangements are being made for her to return for an outpatient procedure.  She has been given instructions and follow-up will be with Dr. Mendes in the office.    Patient Active Problem List   Diagnosis    HTN (hypertension)    ST elevation myocardial infarction (STEMI), unspecified artery (CMS/HCC)    Depressive disorder    Gastroesophageal reflux disease    Hyperlipidemia    NAFLD (nonalcoholic fatty liver disease)    Nicotine dependence    Thyroid nodule        Discharge today  Current Meds to continue  Return next week for further intervention  Follow-up with Dr. Mendes  Code Status:  Full Code            Norman Franklin DO

## 2023-10-04 ENCOUNTER — PATIENT OUTREACH (OUTPATIENT)
Dept: CARE COORDINATION | Facility: CLINIC | Age: 63
End: 2023-10-04
Payer: COMMERCIAL

## 2023-10-04 DIAGNOSIS — I21.3 ST ELEVATION MYOCARDIAL INFARCTION (STEMI), UNSPECIFIED ARTERY (MULTI): ICD-10-CM

## 2023-10-04 NOTE — PROGRESS NOTES
Discharge Facility:[facility]  Discharge Diagnosis:ST elevation myocardial infarction (STEMI), unspecified artery (CMS/HCC)   Admission Date:10.1.23  Discharge Date: 10.3.23    PCP Appointment Date:10.13.23  Specialist Appointment Date:   Hospital Encounter and Summary: Linked   See discharge assessment below for further details     No assessment. 2 missed calls

## 2023-10-05 RX ORDER — ASPIRIN 325 MG
325 TABLET ORAL ONCE
Status: CANCELLED | OUTPATIENT
Start: 2023-10-06

## 2023-10-06 ENCOUNTER — APPOINTMENT (OUTPATIENT)
Dept: CARDIOLOGY | Facility: HOSPITAL | Age: 63
End: 2023-10-06
Payer: COMMERCIAL

## 2023-10-06 ENCOUNTER — HOSPITAL ENCOUNTER (OUTPATIENT)
Facility: HOSPITAL | Age: 63
Setting detail: OUTPATIENT SURGERY
Discharge: HOME | End: 2023-10-06
Attending: STUDENT IN AN ORGANIZED HEALTH CARE EDUCATION/TRAINING PROGRAM | Admitting: STUDENT IN AN ORGANIZED HEALTH CARE EDUCATION/TRAINING PROGRAM
Payer: COMMERCIAL

## 2023-10-06 VITALS
SYSTOLIC BLOOD PRESSURE: 171 MMHG | TEMPERATURE: 98.4 F | WEIGHT: 162.26 LBS | OXYGEN SATURATION: 97 % | DIASTOLIC BLOOD PRESSURE: 70 MMHG | BODY MASS INDEX: 32.71 KG/M2 | RESPIRATION RATE: 16 BRPM | HEART RATE: 77 BPM | HEIGHT: 59 IN

## 2023-10-06 DIAGNOSIS — R07.89 OTHER CHEST PAIN: ICD-10-CM

## 2023-10-06 DIAGNOSIS — I25.110 CORONARY ARTERY DISEASE INVOLVING NATIVE CORONARY ARTERY OF NATIVE HEART WITH UNSTABLE ANGINA PECTORIS (MULTI): Primary | ICD-10-CM

## 2023-10-06 DIAGNOSIS — I25.112 ATHEROSCLEROTIC HEART DISEASE OF NATIVE CORONARY ARTERY WITH REFRACTORY ANGINA PECTORIS (CMS-HCC): ICD-10-CM

## 2023-10-06 DIAGNOSIS — R07.9 CHEST PAIN AT REST: ICD-10-CM

## 2023-10-06 LAB
ATRIAL RATE: 66 BPM
P AXIS: 41 DEGREES
P OFFSET: 198 MS
P ONSET: 149 MS
PR INTERVAL: 126 MS
Q ONSET: 212 MS
QRS COUNT: 10 BEATS
QRS DURATION: 90 MS
QT INTERVAL: 456 MS
QTC CALCULATION(BAZETT): 478 MS
QTC FREDERICIA: 471 MS
R AXIS: -51 DEGREES
T AXIS: 84 DEGREES
T OFFSET: 440 MS
VENTRICULAR RATE: 66 BPM

## 2023-10-06 PROCEDURE — C1725 CATH, TRANSLUMIN NON-LASER: HCPCS | Performed by: STUDENT IN AN ORGANIZED HEALTH CARE EDUCATION/TRAINING PROGRAM

## 2023-10-06 PROCEDURE — 93454 CORONARY ARTERY ANGIO S&I: CPT | Performed by: STUDENT IN AN ORGANIZED HEALTH CARE EDUCATION/TRAINING PROGRAM

## 2023-10-06 PROCEDURE — 92928 PRQ TCAT PLMT NTRAC ST 1 LES: CPT | Performed by: STUDENT IN AN ORGANIZED HEALTH CARE EDUCATION/TRAINING PROGRAM

## 2023-10-06 PROCEDURE — 2780000003 HC OR 278 NO HCPCS: Performed by: STUDENT IN AN ORGANIZED HEALTH CARE EDUCATION/TRAINING PROGRAM

## 2023-10-06 PROCEDURE — C1769 GUIDE WIRE: HCPCS | Performed by: STUDENT IN AN ORGANIZED HEALTH CARE EDUCATION/TRAINING PROGRAM

## 2023-10-06 PROCEDURE — 99152 MOD SED SAME PHYS/QHP 5/>YRS: CPT | Performed by: STUDENT IN AN ORGANIZED HEALTH CARE EDUCATION/TRAINING PROGRAM

## 2023-10-06 PROCEDURE — 85347 COAGULATION TIME ACTIVATED: CPT | Performed by: STUDENT IN AN ORGANIZED HEALTH CARE EDUCATION/TRAINING PROGRAM

## 2023-10-06 PROCEDURE — C1874 STENT, COATED/COV W/DEL SYS: HCPCS | Performed by: STUDENT IN AN ORGANIZED HEALTH CARE EDUCATION/TRAINING PROGRAM

## 2023-10-06 PROCEDURE — 99153 MOD SED SAME PHYS/QHP EA: CPT | Performed by: STUDENT IN AN ORGANIZED HEALTH CARE EDUCATION/TRAINING PROGRAM

## 2023-10-06 PROCEDURE — C1894 INTRO/SHEATH, NON-LASER: HCPCS | Performed by: STUDENT IN AN ORGANIZED HEALTH CARE EDUCATION/TRAINING PROGRAM

## 2023-10-06 PROCEDURE — 93005 ELECTROCARDIOGRAM TRACING: CPT

## 2023-10-06 PROCEDURE — 7100000010 HC PHASE TWO TIME - EACH INCREMENTAL 1 MINUTE: Performed by: STUDENT IN AN ORGANIZED HEALTH CARE EDUCATION/TRAINING PROGRAM

## 2023-10-06 PROCEDURE — 2720000007 HC OR 272 NO HCPCS: Performed by: STUDENT IN AN ORGANIZED HEALTH CARE EDUCATION/TRAINING PROGRAM

## 2023-10-06 PROCEDURE — 93010 ELECTROCARDIOGRAM REPORT: CPT | Performed by: INTERNAL MEDICINE

## 2023-10-06 PROCEDURE — 85347 COAGULATION TIME ACTIVATED: CPT

## 2023-10-06 PROCEDURE — 92978 ENDOLUMINL IVUS OCT C 1ST: CPT | Performed by: STUDENT IN AN ORGANIZED HEALTH CARE EDUCATION/TRAINING PROGRAM

## 2023-10-06 PROCEDURE — 2500000004 HC RX 250 GENERAL PHARMACY W/ HCPCS (ALT 636 FOR OP/ED): Performed by: NURSE PRACTITIONER

## 2023-10-06 PROCEDURE — 2500000004 HC RX 250 GENERAL PHARMACY W/ HCPCS (ALT 636 FOR OP/ED): Performed by: STUDENT IN AN ORGANIZED HEALTH CARE EDUCATION/TRAINING PROGRAM

## 2023-10-06 PROCEDURE — 93458 L HRT ARTERY/VENTRICLE ANGIO: CPT | Mod: 59 | Performed by: STUDENT IN AN ORGANIZED HEALTH CARE EDUCATION/TRAINING PROGRAM

## 2023-10-06 PROCEDURE — 2550000001 HC RX 255 CONTRASTS: Performed by: STUDENT IN AN ORGANIZED HEALTH CARE EDUCATION/TRAINING PROGRAM

## 2023-10-06 PROCEDURE — C1760 CLOSURE DEV, VASC: HCPCS | Performed by: STUDENT IN AN ORGANIZED HEALTH CARE EDUCATION/TRAINING PROGRAM

## 2023-10-06 PROCEDURE — 2500000005 HC RX 250 GENERAL PHARMACY W/O HCPCS: Performed by: STUDENT IN AN ORGANIZED HEALTH CARE EDUCATION/TRAINING PROGRAM

## 2023-10-06 PROCEDURE — C1753 CATH, INTRAVAS ULTRASOUND: HCPCS | Performed by: STUDENT IN AN ORGANIZED HEALTH CARE EDUCATION/TRAINING PROGRAM

## 2023-10-06 PROCEDURE — 7100000009 HC PHASE TWO TIME - INITIAL BASE CHARGE: Performed by: STUDENT IN AN ORGANIZED HEALTH CARE EDUCATION/TRAINING PROGRAM

## 2023-10-06 PROCEDURE — 2500000001 HC RX 250 WO HCPCS SELF ADMINISTERED DRUGS (ALT 637 FOR MEDICARE OP): Performed by: NURSE PRACTITIONER

## 2023-10-06 PROCEDURE — C1887 CATHETER, GUIDING: HCPCS | Performed by: STUDENT IN AN ORGANIZED HEALTH CARE EDUCATION/TRAINING PROGRAM

## 2023-10-06 PROCEDURE — C9600 PERC DRUG-EL COR STENT SING: HCPCS | Performed by: STUDENT IN AN ORGANIZED HEALTH CARE EDUCATION/TRAINING PROGRAM

## 2023-10-06 DEVICE — STENT, ONYX FRONTIER DES, 2.50 X 15RX: Type: IMPLANTABLE DEVICE | Status: FUNCTIONAL

## 2023-10-06 RX ORDER — MORPHINE SULFATE 2 MG/ML
2 INJECTION, SOLUTION INTRAMUSCULAR; INTRAVENOUS
Status: DISCONTINUED | OUTPATIENT
Start: 2023-10-06 | End: 2023-10-06 | Stop reason: HOSPADM

## 2023-10-06 RX ORDER — FENTANYL CITRATE 50 UG/ML
INJECTION, SOLUTION INTRAMUSCULAR; INTRAVENOUS AS NEEDED
Status: DISCONTINUED | OUTPATIENT
Start: 2023-10-06 | End: 2023-10-06 | Stop reason: HOSPADM

## 2023-10-06 RX ORDER — MIDAZOLAM HYDROCHLORIDE 1 MG/ML
INJECTION INTRAMUSCULAR; INTRAVENOUS AS NEEDED
Status: DISCONTINUED | OUTPATIENT
Start: 2023-10-06 | End: 2023-10-06 | Stop reason: HOSPADM

## 2023-10-06 RX ORDER — NITROGLYCERIN 0.4 MG/1
0.4 TABLET SUBLINGUAL EVERY 5 MIN PRN
Status: DISCONTINUED | OUTPATIENT
Start: 2023-10-06 | End: 2023-10-06 | Stop reason: HOSPADM

## 2023-10-06 RX ORDER — ISOSORBIDE MONONITRATE 30 MG/1
30 TABLET, EXTENDED RELEASE ORAL ONCE
Status: COMPLETED | OUTPATIENT
Start: 2023-10-06 | End: 2023-10-06

## 2023-10-06 RX ORDER — LIDOCAINE HYDROCHLORIDE 20 MG/ML
INJECTION, SOLUTION INFILTRATION; PERINEURAL AS NEEDED
Status: DISCONTINUED | OUTPATIENT
Start: 2023-10-06 | End: 2023-10-06 | Stop reason: HOSPADM

## 2023-10-06 RX ORDER — HYDRALAZINE HYDROCHLORIDE 20 MG/ML
INJECTION INTRAMUSCULAR; INTRAVENOUS AS NEEDED
Status: DISCONTINUED | OUTPATIENT
Start: 2023-10-06 | End: 2023-10-06 | Stop reason: HOSPADM

## 2023-10-06 RX ORDER — SODIUM CHLORIDE 9 MG/ML
100 INJECTION, SOLUTION INTRAVENOUS CONTINUOUS
Status: DISCONTINUED | OUTPATIENT
Start: 2023-10-06 | End: 2023-10-06

## 2023-10-06 RX ORDER — ALUMINUM HYDROXIDE, MAGNESIUM HYDROXIDE, AND SIMETHICONE 1200; 120; 1200 MG/30ML; MG/30ML; MG/30ML
10 SUSPENSION ORAL 4 TIMES DAILY PRN
Status: DISCONTINUED | OUTPATIENT
Start: 2023-10-06 | End: 2023-10-06 | Stop reason: HOSPADM

## 2023-10-06 RX ORDER — SODIUM CHLORIDE 9 MG/ML
100 INJECTION, SOLUTION INTRAVENOUS CONTINUOUS
Status: DISCONTINUED | OUTPATIENT
Start: 2023-10-06 | End: 2023-10-06 | Stop reason: HOSPADM

## 2023-10-06 RX ORDER — ACETAMINOPHEN 325 MG/1
650 TABLET ORAL EVERY 6 HOURS PRN
Status: DISCONTINUED | OUTPATIENT
Start: 2023-10-06 | End: 2023-10-06 | Stop reason: HOSPADM

## 2023-10-06 RX ORDER — HEPARIN SODIUM 1000 [USP'U]/ML
INJECTION, SOLUTION INTRAVENOUS; SUBCUTANEOUS AS NEEDED
Status: DISCONTINUED | OUTPATIENT
Start: 2023-10-06 | End: 2023-10-06 | Stop reason: HOSPADM

## 2023-10-06 RX ADMIN — ISOSORBIDE MONONITRATE 30 MG: 30 TABLET, EXTENDED RELEASE ORAL at 07:47

## 2023-10-06 RX ADMIN — SODIUM CHLORIDE 100 ML/HR: 9 INJECTION, SOLUTION INTRAVENOUS at 07:48

## 2023-10-06 ASSESSMENT — PAIN - FUNCTIONAL ASSESSMENT: PAIN_FUNCTIONAL_ASSESSMENT: 0-10

## 2023-10-06 NOTE — NURSING NOTE
Patient returned from cath lab c/o CP. Dr. Mendes at bedside. EKG obtained. Patient taken back to cath lab. Family updated.

## 2023-10-06 NOTE — Clinical Note
Single view of the right coronary artery obtained using power injection. Post angiogram taken and reviewed

## 2023-10-06 NOTE — Clinical Note
Angioplasty of the proximal right coronary artery lesion. Inflation 1: Pressure = 18 megan; Duration = 10 sec. Inflation 2: Pressure = 20 megan; Duration = 8 sec. Inflation 3: Pressure = 22 megan; Duration = 10 sec.

## 2023-10-06 NOTE — POST-PROCEDURE NOTE
Physician Transition of Care Summary  Invasive Cardiovascular Lab    Procedure Date: 10/6/2023  Attending:    * Nish Parnell - Primary  Resident/Fellow/Other Assistant: Mariah Zhou    Pre Procedure Indications:   ACS greater than 24 hours STEMI     Post Procedure Diagnosis:   Original ACS with bystander coronary artery disease    Procedure(s):   FFR / IVUS / OCT and Percutaneous Coronary Intervention    Procedure Findings:   RCA stent severe underexpansion  PLB native stenosis    Description of the Procedure:   Shockwave to mid and ostial RCA  Postdilation with 3.5mm NC balloons in stent at high pressures  New CARLOS ALBERTO placement to PLB  RRA access      Complications:   None    Stents/Implants:   2.5x15mm CARLOS ALBERTO    Anticoagulation/Antiplatelet Plan:   Continue DAPT with aspirin and brilinta    Estimated Blood Loss:   10 mL    Anesthesia: Moderate Sedation Anesthesia Staff: No anesthesia staff entered.    Any Specimen(s) Removed:   No specimens collected during this procedure.    Disposition:   Home      Electronically signed by: Nish Parnell MD, 10/6/2023 11:57 AM

## 2023-10-06 NOTE — Clinical Note
Angioplasty of the proximal right coronary artery lesion. Inflation 1: Pressure = 4 megan; Duration = 15 sec. Inflation 2: Pressure = 4 megan; Duration = 15 sec. Inflation 3: Pressure = 4 megan; Duration = 15 sec. Inflation 4: Pressure = 4 megan; Duration = 15 sec. 40 pulses delivered

## 2023-10-06 NOTE — Clinical Note
Angioplasty of the proximal right coronary artery lesion. Inflation 1: Pressure = 8 megan; Duration = 10 sec.

## 2023-10-06 NOTE — Clinical Note
Angioplasty of the ostium right coronary artery lesion. Inflation 1: Pressure = 4 megan; Duration = 15 sec. Inflation 2: Pressure = 4 megan; Duration = 15 sec. Inflation 3: Pressure = 4 megan; Duration = 15 sec. Inflation 4: Pressure = 4 megan; Duration = 15 sec. 40 pulses delivered

## 2023-10-06 NOTE — PROGRESS NOTES
Discussed results of PCI as well as subsequent repeat LHC with patient and her family members.  Pictures provided.  Please see procedural report for complete details.  Patient underwent successful shockwave therapy to the proximal and mid RCA and CARLOS ALBERTO placement to the PLVB reducing those lesions down to 0% stenosis.  She tolerated the procedure well.  Patient developed chest discomfort after her first procedure and had slight EKG changes and therefore she was taken back to the Cath Lab for repeat LHC which revealed no significant findings and patent stents.  Patient is now chest pain-free.  She was instructed to continue all of her previous medications.  She will be discharged home later today barring no complications.  She will be scheduled to follow-up with Dr. Mendes in 1 to 2 weeks.  All questions answered.  All verbalized understanding.      Dasha Alvarado, YULIA-CNP

## 2023-10-06 NOTE — DISCHARGE INSTRUCTIONS
CARDIAC CATHETERIZATION DISCHARGE INSTRUCTIONS     FOR SUDDEN AND SEVERE CHEST PAIN, SHORTNESS OF BREATH, EXCESSIVE BLEEDING, SIGNS OF STROKE, OR CHANGES IN MENTAL STATUS YOU SHOULD CALL 911 IMMEDIATELY.     If bleeding should occur, lay down and apply pressure to the affected area for 10 minutes.  If the bleeding stops notify your physician.  If there is a large amount of bleeding or spurting of blood CALL 911 immediately.  DO NOT drive yourself to the hospital.     *FOR FEMORAL (LEG) ACCESS*  ·      Avoid heavy lifting (over 10 pounds) for 7 days, squatting or excessive bending for 2 days, and strenuous exercise for 7 days.  ·      No submerged bathing, swimming, or hot tubs for the next 7 days, or until fully healed.  ·      Avoid sexual activity for 3-4 days until any groin discomfort has ceased.     *FOR RADIAL (WRIST) ACCESS*  ·      No lifting more than 3-5 pounds or excessive use of the wrist for 24 hours - for example, treat your wrist as if it is sprained.  ·      Do not engage in vigorous activities (tennis, golf, bowling, weights) for at least 48 hours after the procedure.  ·      Do not submerge the wrist for 7 days after the procedure.  ·      You should expect mild tingling in your hand and tenderness at the puncture site for up to 3 days.     1. If your provider has prescribed aspirin and/or clopidogrel (Plavix), or prasugrel (Effient), or ticagrelor (Brilinta), DO NOT STOP THESE MEDICATIONS for any reason without talking to your cardiologist first. If any of these were prescribed, you must take them every day without missing a single dose. If you are getting low on these medications, contact your provider immediately for a refill.     2. Upon discharge, you should return home and rest for the remainder of the day and evening. You do not have to stay on bed rest but should not be very active.  It is recommended a responsible adult be with you for the first 24 hours after the procedure.     3.  No driving for 24 hours after procedure. Please arrange for someone to drive you home from the hospital today.     4. Do not drive, operate machinery, or use power tools for 24 hours after your procedure.     5. Do not make any legal decisions for 24 hours after your procedure.     6. Do not drink alcoholic beverages for 24 hours after your procedure.     7. The transparent dressing should be removed from the site 24 hours after the procedure.  Wash the site gently with soap and water. Rinse well and pat dry. Keep the area clean and dry. You may apply a Band-Aid to the site. Avoid lotions, ointments, or powders until fully healed.     8. You may shower the day after your procedure.      9. It is normal to notice a small bruise around the puncture site and/or a small grape sized or smaller lump. Any large bruising or large lump warrants a call to the office.       10. You may take acetaminophen (Tylenol) as directed for discomfort.  If pain is not relieved with acetaminophen (Tylenol), contact your doctor.     11. If you notice or experience any of the following, you should notify your doctor or seek medical attention  Chest pain or discomfort  Change in mental status or weakness in extremities.  Dizziness, light headedness, or feeling faint.  Change in the site where the procedure was performed, such as bleeding or an increased area of bruising or swelling.  Tingling, numbness, pain, or coolness in the leg/arm beyond the site where the procedure was performed.  Signs of infection (i.e. shaking chills, temperature > 100 degrees Fahrenheit, warmth, redness) in the leg/arm area where the procedure was performed.  Changes in urination   Bloody or black stools  Vomiting blood  Severe nose bleeds  Any excessive bleeding    12. If you DO NOT have an appointment with your cardiologist within 2-4 weeks following your procedure, please contact their office.

## 2023-10-06 NOTE — Clinical Note
Single view of the right coronary artery obtained using power injection. For confirmation of guide catheter placement

## 2023-10-09 LAB
ACT BLD: 171 SEC (ref 89–169)
ATRIAL RATE: 62 BPM
P AXIS: 46 DEGREES
P OFFSET: 198 MS
P ONSET: 149 MS
PR INTERVAL: 126 MS
Q ONSET: 212 MS
QRS COUNT: 10 BEATS
QRS DURATION: 92 MS
QT INTERVAL: 502 MS
QTC CALCULATION(BAZETT): 509 MS
QTC FREDERICIA: 507 MS
R AXIS: -47 DEGREES
T AXIS: 90 DEGREES
T OFFSET: 463 MS
VENTRICULAR RATE: 62 BPM

## 2023-10-09 PROCEDURE — 93010 ELECTROCARDIOGRAM REPORT: CPT | Performed by: INTERNAL MEDICINE

## 2023-10-11 ENCOUNTER — OFFICE VISIT (OUTPATIENT)
Dept: PRIMARY CARE | Facility: CLINIC | Age: 63
End: 2023-10-11
Payer: MEDICARE

## 2023-10-11 VITALS
TEMPERATURE: 96.9 F | HEART RATE: 67 BPM | DIASTOLIC BLOOD PRESSURE: 78 MMHG | WEIGHT: 160 LBS | HEIGHT: 59 IN | SYSTOLIC BLOOD PRESSURE: 120 MMHG | BODY MASS INDEX: 32.25 KG/M2

## 2023-10-11 DIAGNOSIS — I10 PRIMARY HYPERTENSION: ICD-10-CM

## 2023-10-11 DIAGNOSIS — F17.218 CIGARETTE NICOTINE DEPENDENCE WITH OTHER NICOTINE-INDUCED DISORDER: ICD-10-CM

## 2023-10-11 DIAGNOSIS — I25.110 CORONARY ARTERY DISEASE INVOLVING NATIVE CORONARY ARTERY OF NATIVE HEART WITH UNSTABLE ANGINA PECTORIS (MULTI): Primary | ICD-10-CM

## 2023-10-11 DIAGNOSIS — K76.0 NAFLD (NONALCOHOLIC FATTY LIVER DISEASE): ICD-10-CM

## 2023-10-11 PROBLEM — F41.9 ANXIETY: Status: ACTIVE | Noted: 2023-10-11

## 2023-10-11 PROCEDURE — 99495 TRANSJ CARE MGMT MOD F2F 14D: CPT | Performed by: INTERNAL MEDICINE

## 2023-10-11 PROCEDURE — 1159F MED LIST DOCD IN RCRD: CPT | Performed by: INTERNAL MEDICINE

## 2023-10-11 PROCEDURE — 1126F AMNT PAIN NOTED NONE PRSNT: CPT | Performed by: INTERNAL MEDICINE

## 2023-10-11 PROCEDURE — 4004F PT TOBACCO SCREEN RCVD TLK: CPT | Performed by: INTERNAL MEDICINE

## 2023-10-11 PROCEDURE — 1111F DSCHRG MED/CURRENT MED MERGE: CPT | Performed by: INTERNAL MEDICINE

## 2023-10-11 PROCEDURE — 3078F DIAST BP <80 MM HG: CPT | Performed by: INTERNAL MEDICINE

## 2023-10-11 PROCEDURE — 1160F RVW MEDS BY RX/DR IN RCRD: CPT | Performed by: INTERNAL MEDICINE

## 2023-10-11 PROCEDURE — 3074F SYST BP LT 130 MM HG: CPT | Performed by: INTERNAL MEDICINE

## 2023-10-11 ASSESSMENT — ENCOUNTER SYMPTOMS
ARTHRALGIAS: 1
FATIGUE: 1
DIZZINESS: 0
CARDIOVASCULAR NEGATIVE: 1
SHORTNESS OF BREATH: 1
GASTROINTESTINAL NEGATIVE: 1

## 2023-10-11 NOTE — PROGRESS NOTES
"Patient: Audrey Estrada  : 1960  PCP: Cam Araya MD  MRN: [id number]  Program: No linked episodes     Audrey Estrada is a 63 y.o. female presenting today for follow-up after being discharged from the hospital 11 days ago. The main problem requiring admission was ST elevation MI. The discharge summary and/or Transitional Care Management documentation was reviewed. Medication reconciliation was performed as indicated via the \"Miguel as Reviewed\" timestamp.   This patient has a myocardial infarction, she was found to have a circumflex stenosis and first obtuse marginal stenosis which was intervened.  Then she was having RCA disease 70% which was intervened so altogether she has had 3 stents, looks like 1 stent was revised with another PCI, it was a major ST elevation myocardial infarction but fortunately she did not have any impaired systolic functions.  She came with hemoglobin of 11.4 and left with hemoglobin of 8.3, creatinine did not go up, she did not follow-up with me but interestingly she has a coronary calcium score which was 503 followed by stress test which was completely negative but she has a significant stenotic ongoing coronary artery disease for which she was not Symptomatic and her lifestyle was high but she says, she has been a heavy smoker, she has been a very unhealthy eater.  She will eat out, she has a visceral obesity, she has a nonalcoholic fatty liver disease so so many things were against her physical health to the point that she sustained myocardial infarction.  Audrey Estrada was contacted by Transitional Care Management services two days after her discharge. This encounter and supporting documentation was reviewed.    The complexity of medical decision making for this patient's transitional care is high.    Physical Exam  Constitutional:       Appearance: Normal appearance. She is obese.   HENT:      Head: Normocephalic.   Eyes:      Conjunctiva/sclera: Conjunctivae normal. "   Cardiovascular:      Rate and Rhythm: Normal rate and regular rhythm.      Pulses: Normal pulses.      Heart sounds: Normal heart sounds.   Pulmonary:      Effort: Pulmonary effort is normal.      Breath sounds: Normal breath sounds.   Abdominal:      Palpations: Abdomen is soft.   Musculoskeletal:         General: Tenderness present.      Cervical back: Neck supple.   Skin:     General: Skin is warm and dry.   Neurological:      Mental Status: She is oriented to person, place, and time. Mental status is at baseline.   Psychiatric:         Mood and Affect: Mood is anxious.       Assessment/Plan   Problem List Items Addressed This Visit             ICD-10-CM    HTN (hypertension) I10    NAFLD (nonalcoholic fatty liver disease) K76.0    Nicotine dependence F17.200    Coronary artery disease involving native coronary artery of native heart with unstable angina pectoris (CMS/HCC) - Primary I25.110   Recent hospitalization data and information reviewed, all reports, labs, radiological investigations and consultations and follow ups reviewed during this visit. Pt is doing well, precautions to be taken in the future for acute ailments or acute illness and change of condition are discussed, will continue to have close follow up, medication list was reviewed and updated and necessary changes were applied.  All the events and concerns were reviewed, she will be on Brilinta and aspirin for 12 months minimum or Brilinta could be longer.  She will continue losartan, HCTZ, Prozac, carvedilol, atorvastatin.  Nature and severity of the problem was explained to her and she could be benefited by cardiac rehab.  It will take some time for this patient to recover from this major trauma from myocardial infarction her tiredness and fatigability may take some time, anemia may take some time to recover.  No further test or investigations, went through all the reports, went through the medication list, patient was advised to have a  follow-up with me in 6 weeks and this will be considered as a transitional care related visit and her hemodynamics were stable and sustainable today during my office encounter.    Review of Systems   Constitutional:  Positive for fatigue.   Respiratory:  Positive for shortness of breath.    Cardiovascular: Negative.    Gastrointestinal: Negative.    Musculoskeletal:  Positive for arthralgias.   Skin: Negative.    Neurological:  Negative for dizziness.       Family History   Problem Relation Name Age of Onset    Heart attack Father  65    Heart disease Paternal Grandmother  65       No data recorded    No follow-ups on file.

## 2023-10-13 ENCOUNTER — APPOINTMENT (OUTPATIENT)
Dept: PRIMARY CARE | Facility: CLINIC | Age: 63
End: 2023-10-13
Payer: COMMERCIAL

## 2023-10-14 RX ORDER — PANTOPRAZOLE SODIUM 40 MG/1
TABLET, DELAYED RELEASE ORAL
COMMUNITY
Start: 2012-10-10 | End: 2023-10-16 | Stop reason: ALTCHOICE

## 2023-10-14 RX ORDER — ATORVASTATIN CALCIUM 20 MG/1
20 TABLET, FILM COATED ORAL
COMMUNITY
End: 2023-10-16

## 2023-10-14 RX ORDER — ATORVASTATIN CALCIUM 10 MG/1
TABLET, FILM COATED ORAL
COMMUNITY
Start: 2012-10-17 | End: 2023-10-16

## 2023-10-14 RX ORDER — BISOPROLOL FUMARATE AND HYDROCHLOROTHIAZIDE 10; 6.25 MG/1; MG/1
1 TABLET ORAL DAILY
COMMUNITY
Start: 2019-08-11 | End: 2023-10-16 | Stop reason: ALTCHOICE

## 2023-10-14 RX ORDER — VALSARTAN 80 MG/1
1 TABLET ORAL DAILY
COMMUNITY
Start: 2021-02-22 | End: 2023-10-16 | Stop reason: ALTCHOICE

## 2023-10-14 RX ORDER — FENOFIBRATE 160 MG/1
1 TABLET ORAL EVERY OTHER DAY
COMMUNITY
Start: 2019-11-11 | End: 2023-10-16 | Stop reason: ALTCHOICE

## 2023-10-16 ENCOUNTER — LAB (OUTPATIENT)
Dept: LAB | Facility: LAB | Age: 63
End: 2023-10-16
Payer: COMMERCIAL

## 2023-10-16 ENCOUNTER — DOCUMENTATION (OUTPATIENT)
Dept: CARDIOLOGY | Facility: CLINIC | Age: 63
End: 2023-10-16

## 2023-10-16 ENCOUNTER — OFFICE VISIT (OUTPATIENT)
Dept: CARDIOLOGY | Facility: CLINIC | Age: 63
End: 2023-10-16
Payer: COMMERCIAL

## 2023-10-16 ENCOUNTER — PHARMACY VISIT (OUTPATIENT)
Dept: PHARMACY | Facility: CLINIC | Age: 63
End: 2023-10-16

## 2023-10-16 VITALS
HEIGHT: 59 IN | HEART RATE: 64 BPM | WEIGHT: 158.2 LBS | DIASTOLIC BLOOD PRESSURE: 60 MMHG | SYSTOLIC BLOOD PRESSURE: 114 MMHG | BODY MASS INDEX: 31.89 KG/M2

## 2023-10-16 DIAGNOSIS — I25.10 CAD S/P PERCUTANEOUS CORONARY ANGIOPLASTY: ICD-10-CM

## 2023-10-16 DIAGNOSIS — I25.110 CORONARY ARTERY DISEASE INVOLVING NATIVE CORONARY ARTERY OF NATIVE HEART WITH UNSTABLE ANGINA PECTORIS (MULTI): ICD-10-CM

## 2023-10-16 DIAGNOSIS — E78.2 MIXED HYPERLIPIDEMIA: ICD-10-CM

## 2023-10-16 DIAGNOSIS — D64.9 ANEMIA, UNSPECIFIED TYPE: ICD-10-CM

## 2023-10-16 DIAGNOSIS — K21.9 GASTROESOPHAGEAL REFLUX DISEASE, UNSPECIFIED WHETHER ESOPHAGITIS PRESENT: ICD-10-CM

## 2023-10-16 DIAGNOSIS — I10 PRIMARY HYPERTENSION: ICD-10-CM

## 2023-10-16 DIAGNOSIS — Z98.61 CAD S/P PERCUTANEOUS CORONARY ANGIOPLASTY: ICD-10-CM

## 2023-10-16 DIAGNOSIS — Z87.891 FORMER CIGARETTE SMOKER: ICD-10-CM

## 2023-10-16 DIAGNOSIS — I25.2 HISTORY OF ST ELEVATION MYOCARDIAL INFARCTION (STEMI): ICD-10-CM

## 2023-10-16 LAB
IRON SATN MFR SERPL: ABNORMAL %
IRON SERPL-MCNC: 50 UG/DL (ref 35–150)
TIBC SERPL-MCNC: ABNORMAL UG/DL
UIBC SERPL-MCNC: >450 UG/DL (ref 110–370)

## 2023-10-16 PROCEDURE — 83550 IRON BINDING TEST: CPT

## 2023-10-16 PROCEDURE — 36415 COLL VENOUS BLD VENIPUNCTURE: CPT

## 2023-10-16 PROCEDURE — 1126F AMNT PAIN NOTED NONE PRSNT: CPT | Performed by: STUDENT IN AN ORGANIZED HEALTH CARE EDUCATION/TRAINING PROGRAM

## 2023-10-16 PROCEDURE — 3074F SYST BP LT 130 MM HG: CPT | Performed by: STUDENT IN AN ORGANIZED HEALTH CARE EDUCATION/TRAINING PROGRAM

## 2023-10-16 PROCEDURE — RXMED WILLOW AMBULATORY MEDICATION CHARGE

## 2023-10-16 PROCEDURE — 4004F PT TOBACCO SCREEN RCVD TLK: CPT | Performed by: STUDENT IN AN ORGANIZED HEALTH CARE EDUCATION/TRAINING PROGRAM

## 2023-10-16 PROCEDURE — 3008F BODY MASS INDEX DOCD: CPT | Performed by: STUDENT IN AN ORGANIZED HEALTH CARE EDUCATION/TRAINING PROGRAM

## 2023-10-16 PROCEDURE — 99214 OFFICE O/P EST MOD 30 MIN: CPT | Performed by: STUDENT IN AN ORGANIZED HEALTH CARE EDUCATION/TRAINING PROGRAM

## 2023-10-16 PROCEDURE — 1160F RVW MEDS BY RX/DR IN RCRD: CPT | Performed by: STUDENT IN AN ORGANIZED HEALTH CARE EDUCATION/TRAINING PROGRAM

## 2023-10-16 PROCEDURE — 1111F DSCHRG MED/CURRENT MED MERGE: CPT | Performed by: STUDENT IN AN ORGANIZED HEALTH CARE EDUCATION/TRAINING PROGRAM

## 2023-10-16 PROCEDURE — 1159F MED LIST DOCD IN RCRD: CPT | Performed by: STUDENT IN AN ORGANIZED HEALTH CARE EDUCATION/TRAINING PROGRAM

## 2023-10-16 PROCEDURE — 83540 ASSAY OF IRON: CPT

## 2023-10-16 PROCEDURE — 3078F DIAST BP <80 MM HG: CPT | Performed by: STUDENT IN AN ORGANIZED HEALTH CARE EDUCATION/TRAINING PROGRAM

## 2023-10-16 RX ORDER — FERROUS SULFATE 325(65) MG
325 TABLET, DELAYED RELEASE (ENTERIC COATED) ORAL
Qty: 30 TABLET | Refills: 0 | Status: SHIPPED | OUTPATIENT
Start: 2023-10-16 | End: 2023-11-09 | Stop reason: ALTCHOICE

## 2023-10-16 NOTE — PATIENT INSTRUCTIONS
Patient to follow up in 3 months with Dr. Vaughn MD     Office will refer you to Cardiac Rehab- this will be good to help get you active, and follow with other patients that have also gone thru heart attack situations similar to yours.     Monitor your blood pressure at home and keep a log- you can mail this in a few weeks for our review.   You can  a blood pressure cuff from [employer]- get the arm cuff, NOT wrist cuff.   [employer] is a good brand to search.     Please call and follow up with Dr. Araya in terms of your Anemia.   We ordered lab work today for you to do- will call with results.   Please START Ferrous Sulfate 325mg once daily with breakfast in the meantime.     No other changes today.   Continue same medications.     I, Marc Fox RN am scribing for and in the presence of Dr. Nish Mendes MD

## 2023-10-16 NOTE — PROGRESS NOTES
Chief Complaint   Patient presents with    Hospital Follow-up     After catheterization.  States she has not felt right since the procedure, c/o dizziness        History of Present Illness  Audrey Estrada is a 63 y.o. year old female patient with history of smoking, hyperlipidemia, and hypertension who is presenting for follow-up after hospitalization with inferolateral STEMI.    Patient had a recent admission with chest pain found to have inferolateral STEMI.  Left heart cath showed left circumflex, obtuse marginal culprit, as well as bystander RCA disease.  She underwent PCI with CARLOS ALBERTO to circumflex, obtuse marginal, followed by staged PCI to RCA, and right posterolateral branch.  During hospitalization patient noted to be anemic with a hemoglobin around 9.  Normal LV function.    Patient reports she has not had any recurrent chest pain.  However she does feel increasingly fatigued, and feels exhausted throughout the day.  Also reports some intermittent mild dizziness.  Denies any bleeding issues.  Reports adherence to all medications.    Past Medical History:   Diagnosis Date    Anxiety     Coronary artery disease     Hyperlipidemia     STEMI (ST elevation myocardial infarction) (CMS/[facility])        Social History     Tobacco Use    Smoking status: Former     Packs/day: 1.00     Years: 15.00     Additional pack years: 0.00     Total pack years: 15.00     Types: Cigarettes     Quit date: 2023     Years since quittin.0    Smokeless tobacco: Never   Vaping Use    Vaping Use: Never used   Substance Use Topics    Alcohol use: Never    Drug use: Never       Family History   Problem Relation Name Age of Onset    Heart attack Father  65    Heart disease Paternal Grandmother  65       Review of Systems  As per HPI, all other systems reviewed and negative.    Outpatient Medications:  Current Outpatient Medications   Medication Instructions    aspirin 81 mg, oral, Daily    atorvastatin (LIPITOR) 80 mg, oral, Daily     Brilinta 90 mg, oral, 2 times daily    carvedilol (COREG) 25 mg, oral, 2 times daily    cholecalciferol (VITAMIN D-3) 25 mcg, oral, Daily    FLUoxetine (PROZAC) 20 mg, oral, Daily    hydroCHLOROthiazide (HYDRODIURIL) 12.5 mg, oral, Daily    losartan (COZAAR) 100 mg, oral, Daily    omega-3 acid ethyl esters (Lovaza) 1 gram capsule TAKE 1 CAPSULE BY MOUTH TWICE A DAY    omeprazole OTC (PRILOSEC OTC) 1 mg, oral, Daily before breakfast, Do not crush, chew, or split.         Vitals:  Vitals:    10/16/23 1019   BP: 114/60   Pulse: 64       Physical Exam:  Constitutional:       Appearance: Normal and healthy appearance. Not in distress.   Eyes:      Conjunctiva/sclera: Conjunctivae normal.      Pupils: Pupils are equal, round, and reactive to light.   Neck:      Vascular: No JVR. JVD normal.   Pulmonary:      Effort: Pulmonary effort is normal.      Breath sounds: Normal breath sounds. No wheezing. No rhonchi. No rales.   Chest:      Chest wall: Not tender to palpatation.   Cardiovascular:      PMI at left midclavicular line. Normal rate. Regular rhythm. Normal S1. Normal S2.       Murmurs: There is no murmur.      No gallop.  No click. No rub.   Pulses:     Intact distal pulses.   Edema:     Peripheral edema absent.   Abdominal:      Tenderness: There is no abdominal tenderness.   Musculoskeletal: Normal range of motion.         General: No tenderness.      Cervical back: Normal range of motion. Skin:     General: Skin is warm and dry.   Neurological:      General: No focal deficit present.      Mental Status: Alert and oriented to person, place and time.           Reviewed Study(s):    Left Heart Cath:  10/1/2023- IVUS CARLOS ALBERTO to mid circumflex and Ostium of obtuse marginal  10/2/2023- IVUS CARLOS ALBERTO to mid to proximal RCA  10/3/2023- IVUS Shockwave IVL to the under expanded ostial RCA Stent; CARLOS ALBERTO to severe stenosis of mid to distal RPL Branch.   10/6/2023- Recurrent complaints of Chest Pain in post op stage, patent coronary stents  with RUBI 3 flow in all vessels and no residual significant stenosis.     Echo:  10/1/2023:  CONCLUSIONS:   1. Left ventricular systolic function is normal with a 60% estimated ejection fraction.   2. Spectral Doppler shows an impaired relaxation pattern of left ventricular diastolic filling.   3. There is evidence of mild mitral valve stenosis.   4. There is moderate mitral annular calcification.   5. Mild tricuspid regurgitation is visualized.   6. Mild aortic valve stenosis.   7. The main pulmonary artery is normal in size, and position, with normal bifurcation into the left and right pulmonary arteries.    Lexiscan Nuclear:  7/19/2021:  IMPRESSION:  Normal Lexiscan myocardial perfusion study.  No evidence of ischemia or myocardial infarction by perfusion imaging.  Normal left ventricular systolic function, ejection fraction is 68%.  There are no prior studies for comparison.  None invasive risk stratification is low risk    Assessment/Plan   Diagnoses and all orders for this visit:  CAD S/P percutaneous coronary angioplasty  Coronary artery disease involving native coronary artery of native heart with unstable angina pectoris (CMS/HCC)  Primary hypertension  Mixed hyperlipidemia  History of ST elevation myocardial infarction (STEMI)  Anemia, unspecified type  Gastroesophageal reflux disease, unspecified whether esophagitis present  BMI 31.0-31.9,adult  Former cigarette smoker        -Referral to cardiac rehab  -Continue DAPT with aspirin ticagrelor for now for least 1 year given ACS.  Patient is can check if Brilinta is covered by her insurance, she currently has the pills that were provided in the hospital.  If Brilinta is too expensive, will consider switching to prasugrel.  Discussed with patient importance of medication adherence  -Blood pressure is at goal.  Continue current antihypertensives.  Instructed to monitor blood pressure at home  -She may also benefit from psychotherapy to help with her  anxiety  -Continue high intensity statin.  Check lipid panel before next visit  -She likely has microcytic anemia.  Will order iron studies.  Follow-up with primary care doctor      Nish Mendes MD [facility]  Interventional Cardiology  Endovascular Interventions  [email or web address]    Thank you for allowing me to participate in the care of this patient. Please do not hesitate to contact me with any further questions or concerns.

## 2023-10-16 NOTE — PROGRESS NOTES
10/16/23  Pt. Left vm she just saw Dr. Nish Padron MD, F[facility], [facility], and forgot to ask if she is OK to get a flu shot.  Called her back and advised generally, yes, but that we always defer to the PCP to be sure this is OK since they are more aware of all other contributing medical diagnoses.  Pt. Understood.  Jung

## 2023-10-19 ENCOUNTER — TELEPHONE (OUTPATIENT)
Dept: CARDIOLOGY | Facility: CLINIC | Age: 63
End: 2023-10-19
Payer: COMMERCIAL

## 2023-10-19 NOTE — TELEPHONE ENCOUNTER
MD Marc Jenkins RN  Please let her know iron levels do not look bad.  Encouraged to follow-up with PCP regarding this values before starting iron supplements

## 2023-10-19 NOTE — TELEPHONE ENCOUNTER
Pt called office back, advised of the above recommendations.  Pt verbalized an understanding and will follow up with PCP.

## 2023-10-25 ENCOUNTER — HOSPITAL ENCOUNTER (OUTPATIENT)
Dept: CARDIOLOGY | Facility: HOSPITAL | Age: 63
Discharge: HOME | End: 2023-10-25
Payer: COMMERCIAL

## 2023-10-25 PROCEDURE — 93005 ELECTROCARDIOGRAM TRACING: CPT

## 2023-10-27 ENCOUNTER — TELEPHONE (OUTPATIENT)
Dept: CARDIOLOGY | Facility: CLINIC | Age: 63
End: 2023-10-27
Payer: COMMERCIAL

## 2023-10-27 ENCOUNTER — PATIENT OUTREACH (OUTPATIENT)
Dept: CARE COORDINATION | Facility: CLINIC | Age: 63
End: 2023-10-27
Payer: COMMERCIAL

## 2023-10-27 NOTE — PROGRESS NOTES
Call regarding appt. with PCP on 10.27.23 after hospitalization.  At time of outreach call the patient feels as if their condition has stayed the same. Still feeling weak since last visit.  Reviewed with patient the PCP appointment and any questions or concerns regarding the appt.

## 2023-10-27 NOTE — TELEPHONE ENCOUNTER
Pt left message that she needs Rx sent to [facility].  No Rx info left.    Attempted to contact pt, left message for pt to call office back.

## 2023-10-31 DIAGNOSIS — I10 PRIMARY HYPERTENSION: ICD-10-CM

## 2023-10-31 DIAGNOSIS — I21.3 ST ELEVATION MYOCARDIAL INFARCTION (STEMI), UNSPECIFIED ARTERY (MULTI): ICD-10-CM

## 2023-10-31 DIAGNOSIS — E78.2 MODERATE MIXED HYPERLIPIDEMIA NOT REQUIRING STATIN THERAPY: ICD-10-CM

## 2023-10-31 NOTE — TELEPHONE ENCOUNTER
Pt left message that she needs medications. NO additional information provided.    Attempted to contact, left message for pt.  Advised that if she gets the VM to leave medication information and pharmacy so we can get her scripts sent in.

## 2023-11-01 RX ORDER — LOSARTAN POTASSIUM 100 MG/1
100 TABLET ORAL DAILY
Qty: 30 TABLET | Refills: 3 | Status: SHIPPED | OUTPATIENT
Start: 2023-11-01 | End: 2024-01-22 | Stop reason: SDUPTHER

## 2023-11-01 RX ORDER — ATORVASTATIN CALCIUM 80 MG/1
80 TABLET, FILM COATED ORAL DAILY
Qty: 30 TABLET | Refills: 3 | Status: SHIPPED | OUTPATIENT
Start: 2023-11-01 | End: 2024-01-22 | Stop reason: SDUPTHER

## 2023-11-01 RX ORDER — CARVEDILOL 25 MG/1
25 TABLET ORAL 2 TIMES DAILY
Qty: 60 TABLET | Refills: 3 | Status: SHIPPED | OUTPATIENT
Start: 2023-11-01 | End: 2024-01-22 | Stop reason: SDUPTHER

## 2023-11-01 RX ORDER — HYDROCHLOROTHIAZIDE 12.5 MG/1
12.5 TABLET ORAL DAILY
Qty: 30 TABLET | Refills: 3 | Status: SHIPPED | OUTPATIENT
Start: 2023-11-01 | End: 2023-11-09 | Stop reason: ALTCHOICE

## 2023-11-01 NOTE — TELEPHONE ENCOUNTER
Pended for refills- sent to Dr. Juan Miguel Kam MD  in Dr. Nish Mendes MD [facility] absence.

## 2023-11-02 ENCOUNTER — CLINICAL SUPPORT (OUTPATIENT)
Dept: CARDIAC REHAB | Facility: HOSPITAL | Age: 63
End: 2023-11-02
Payer: COMMERCIAL

## 2023-11-02 DIAGNOSIS — Z98.61 CAD S/P PERCUTANEOUS CORONARY ANGIOPLASTY: ICD-10-CM

## 2023-11-02 DIAGNOSIS — I25.2 HISTORY OF ST ELEVATION MYOCARDIAL INFARCTION (STEMI): ICD-10-CM

## 2023-11-02 DIAGNOSIS — E78.2 MIXED HYPERLIPIDEMIA: ICD-10-CM

## 2023-11-02 DIAGNOSIS — I25.10 CAD S/P PERCUTANEOUS CORONARY ANGIOPLASTY: ICD-10-CM

## 2023-11-02 DIAGNOSIS — I25.110 CORONARY ARTERY DISEASE INVOLVING NATIVE CORONARY ARTERY OF NATIVE HEART WITH UNSTABLE ANGINA PECTORIS (MULTI): ICD-10-CM

## 2023-11-02 DIAGNOSIS — I10 PRIMARY HYPERTENSION: ICD-10-CM

## 2023-11-02 NOTE — PROGRESS NOTES
Name: Audrey Estrada   : 1960   Diagnosis: STEMI, PTCA  MRN: [id number]   Onset Date: 10/01/23    Today's Date: 23      Cardiovascular   HX: HTN and HLD  Family HX of CAD:  No  Angina: none  History of Heart Failure: No  Family HX of HF:  No  Devices:  none  HX of PAD: No    Arrythmias: normal sinus rhythm  Apical: regular  Heart Rate: 68  BP: 121/56  Radial pulses:  R Present 2+ L Present 2+      Respiratory  Dyspnea:  Yes, on exertion   CPAP Use:  No  Family History of Lung Disease:  No    Resting O2 sat: 97  Lung Sounds:  Clear  Locations: all lobes    Comments:    Neurological   Orientation: oriented to person, place, time, and general circumstances  History of stroke/TIA?: No    Comments:      Skin  Skin Color: normal, no cyanosis, jaundice, pallor or bruising  Edema:  None      Comments:    Gastrointestinal/Genitourinary  HX: GERD  Comments: Taking medication      Psychosocial  Marital status:   Children:  2  Lives alone:  No  Lives with:   Drives:  Yes  Occupation: is employed part time as a .  Occupational demands include frequent standing   Caretaker of family member?:  No  Do you feel safe at home?:  Yes    Caffeinated drinks per day: 0  Alcoholic drinks per day/week: 0  HX drug or alcohol abuse?: No  Current use of illicit drugs?: No    Comments:    Musculoskeletal  HX of injury/surgery: No    Comments:    Pain Assessment  Current pain: chronic  Location: Rt knee  Description: Arthritic pain    Comments:    Fall Risk Assessment  Assistive device: no device  Needs assistance:  No  Afraid of falling:  No  Fall within the past 6 months?: No  Fall risk results: low              INDIVIDUAL CARDIAC TREATMENT PLAN-INITIAL ASSESSMENT     Name: Audrey Estrada   Today's Date: 23   : 1960   Primary Provider: Marshal  MRN: [id number]   Referring Physician: Nereida     Diagnosis: PTCA, MI    Onset Date: 10/01/23      Risk Stratification:  "Moderate      NUTRITION ASSESSMENT  Lipids:   Lipid Lab Date:  Total Chol:  HDL:  LDL:  Trig:  Cholesterol Med:    Diabetes: No      Weight Management  Weight: 157.5 lbs  Height: 4'11\"  BMI:  31.81  Current Diet: Regular  Barriers to dietary change:    Initial Dietary Assessment Score: 39%      NUTRITION PLAN  Nutrition Goals:   1. Improve Picture Your Plate assessment results by discharge.  2. Make changes to diet to include heart healthy options while in the program.    Nutrition Intervention/Education:   *Recommend diet consult with dietician   *Perform weekly weight checks at rehab.   *Patient scored 39% on diet assessment      OTHER CORE COMPONENTS/ RISK FACTORS ASSESSMENT  Medication compliance: good compliance  Using pill box: No  Carries medication list: Yes    Blood Pressure Management:  History of High BP: Yes  Resting BP: 121/56    Tobacco: RECENT (quit <6 months)  Quit Date: 09/30/2023  Anyone in the house smoke: N/A    Initial Knowledge Test Score:11/15    OTHER CORE COMPONENTS/ RISK FACTOR PLAN   Other Core components/Risk Factor Goals:                                                                                                                                                      1. Achieve and maintain a resting blood pressure less than 130/80 while in the program.  2. Gain knowledge of cardiac disease and lifestyle modifications related to exercise and ADL's prior to discharge.  3. Remain smoke free while in the program  Other Components/ Risk Factors Intervention/Education:  *Will monitor HR, BP, dyspnea and arrhythmias each session.   * Patient is compliant to taking their medication   *Encouraged review of education materials.       PSYCHOSOCIAL ASSESSMENT  Patient reported stress level: moderate  Using stress management skills: Yes  HX of anxiety: Yes  HX of depression: Yes  Patient questioned regarding any new stress, depression and anxiety symptoms: Yes    Family/Support System:  and " kids  Seeing mental health provider: No  Psychosocial medications: Prozac    Initial PHQ-9 score: 20  Was PHQ-9 faxed to provider: Yes  Date faxed: 11/02/23    Quality of Life Survey: To be scored  PCS:  MCS:    Status of change:  Action    PSYCHOSOCIAL PLAN  Psychosocial Goals:  1. Improve stage of change while in the program.  2. Identify personal stressors and begin strategies for managing stress by discharge   3. Maintain or lower PHQ-9 survey score     Psychosocial Interventions/ Education:  * Encourage to attend stress management educational class   *Patient is able to visit with the program counselor at any given time upon request while enrolled in the program   *Patient scored 20 on PHQ-9, SF-36 to be graded       EXERCISE ASSESSMENT  Home Exercise: No    EXERCISE PLAN  Exercise Goals:   1. To increase MET level by 5-10% each week   2. Have a plan in place for continued exercise after the program by discharge.  3. Increase exercise duration to 30-45 minutes each session     Exercise Prescription:   Based on Pre-rehab GXT  Frequency: 2 days per week  Duration (total aerobic min.): 30-40 minutes  Intensity RPE: 11-14    Date of first exercise session:     Modality METS Load  Duration   1 Warm Up    05:00   2 Treadmill 1.9 1.2 mph  15:00   3 NuStep 2 28 salazar 2 15:00   4 Cardiostrider 2 40 SPM 2 15:00   5 Weights  3 LBS  10:00   6 Education    10:00   7 Cool Down     05:00     Exercise Intervention:   *To increase MET level by 5-10% each week   *Incorporate resistance training for muscular endurance and strength.  *To increase total exercise duration to 30-45 minutes     LEARNING ASSESSMENT & BARRIERS  Readiness to Learn: Yes  Barriers: None  Comments:    FALL RISK  low  Comments:        INDIVIDUAL PATIENT GOALS:  1.To feel better  2. To get stronger        MEDICATIONS  Current Outpatient Medications   Medication Instructions    aspirin 81 mg, oral, Daily    atorvastatin (LIPITOR) 80 mg, oral, Daily     carvedilol (COREG) 25 mg, oral, 2 times daily    cholecalciferol (VITAMIN D-3) 25 mcg, oral, Daily    ferrous sulfate 325 mg, oral, Daily with breakfast, Do not crush, chew, or split.    FLUoxetine (PROZAC) 20 mg, oral, Daily    hydroCHLOROthiazide (HYDRODIURIL) 12.5 mg, oral, Daily    losartan (COZAAR) 100 mg, oral, Daily    omega-3 acid ethyl esters (Lovaza) 1 gram capsule TAKE 1 CAPSULE BY MOUTH TWICE A DAY    omeprazole OTC (PRILOSEC OTC) 1 mg, oral, Daily before breakfast, Do not crush, chew, or split.    ticagrelor (BRILINTA) 90 mg, oral, 2 times daily           STAFF COMMENTS: Pt's PHQ-9 sent to Dr. Padron as fax with doctor notification.

## 2023-11-03 ENCOUNTER — HOSPITAL ENCOUNTER (EMERGENCY)
Facility: HOSPITAL | Age: 63
Discharge: HOME | End: 2023-11-03
Attending: EMERGENCY MEDICINE
Payer: COMMERCIAL

## 2023-11-03 ENCOUNTER — TELEPHONE (OUTPATIENT)
Dept: CARDIOLOGY | Facility: CLINIC | Age: 63
End: 2023-11-03
Payer: COMMERCIAL

## 2023-11-03 ENCOUNTER — DOCUMENTATION (OUTPATIENT)
Dept: PRIMARY CARE | Facility: CLINIC | Age: 63
End: 2023-11-03

## 2023-11-03 ENCOUNTER — APPOINTMENT (OUTPATIENT)
Dept: RADIOLOGY | Facility: HOSPITAL | Age: 63
End: 2023-11-03
Payer: COMMERCIAL

## 2023-11-03 VITALS
WEIGHT: 155 LBS | BODY MASS INDEX: 31.25 KG/M2 | OXYGEN SATURATION: 97 % | DIASTOLIC BLOOD PRESSURE: 69 MMHG | HEART RATE: 80 BPM | RESPIRATION RATE: 16 BRPM | TEMPERATURE: 97.9 F | HEIGHT: 59 IN | SYSTOLIC BLOOD PRESSURE: 166 MMHG

## 2023-11-03 DIAGNOSIS — E83.42 HYPOMAGNESEMIA: ICD-10-CM

## 2023-11-03 DIAGNOSIS — E87.6 HYPOKALEMIA: ICD-10-CM

## 2023-11-03 DIAGNOSIS — R04.0 EPISTAXIS: Primary | ICD-10-CM

## 2023-11-03 LAB
ALBUMIN SERPL BCP-MCNC: 3.8 G/DL (ref 3.4–5)
ALP SERPL-CCNC: 47 U/L (ref 33–136)
ALT SERPL W P-5'-P-CCNC: 15 U/L (ref 7–45)
ANION GAP SERPL CALC-SCNC: 15 MMOL/L (ref 10–20)
AST SERPL W P-5'-P-CCNC: 17 U/L (ref 9–39)
BASOPHILS # BLD AUTO: 0.06 X10*3/UL (ref 0–0.1)
BASOPHILS NFR BLD AUTO: 0.7 %
BILIRUB SERPL-MCNC: 0.4 MG/DL (ref 0–1.2)
BNP SERPL-MCNC: 65 PG/ML (ref 0–99)
BUN SERPL-MCNC: 27 MG/DL (ref 6–23)
CALCIUM SERPL-MCNC: 8.2 MG/DL (ref 8.6–10.3)
CARDIAC TROPONIN I PNL SERPL HS: 5 NG/L (ref 0–13)
CARDIAC TROPONIN I PNL SERPL HS: 6 NG/L (ref 0–13)
CHLORIDE SERPL-SCNC: 103 MMOL/L (ref 98–107)
CO2 SERPL-SCNC: 26 MMOL/L (ref 21–32)
CREAT SERPL-MCNC: 1.06 MG/DL (ref 0.5–1.05)
EOSINOPHIL # BLD AUTO: 0.32 X10*3/UL (ref 0–0.7)
EOSINOPHIL NFR BLD AUTO: 4 %
ERYTHROCYTE [DISTWIDTH] IN BLOOD BY AUTOMATED COUNT: 18.2 % (ref 11.5–14.5)
GFR SERPL CREATININE-BSD FRML MDRD: 58 ML/MIN/1.73M*2
GLUCOSE SERPL-MCNC: 112 MG/DL (ref 74–99)
HCT VFR BLD AUTO: 31.8 % (ref 36–46)
HGB BLD-MCNC: 9.8 G/DL (ref 12–16)
HOLD SPECIMEN: NORMAL
IMM GRANULOCYTES # BLD AUTO: 0.03 X10*3/UL (ref 0–0.7)
IMM GRANULOCYTES NFR BLD AUTO: 0.4 % (ref 0–0.9)
INR PPP: 1.2 (ref 0.9–1.1)
LACTATE SERPL-SCNC: 1.4 MMOL/L (ref 0.4–2)
LYMPHOCYTES # BLD AUTO: 0.82 X10*3/UL (ref 1.2–4.8)
LYMPHOCYTES NFR BLD AUTO: 10.2 %
MAGNESIUM SERPL-MCNC: 1.13 MG/DL (ref 1.6–2.4)
MCH RBC QN AUTO: 23.4 PG (ref 26–34)
MCHC RBC AUTO-ENTMCNC: 30.8 G/DL (ref 32–36)
MCV RBC AUTO: 76 FL (ref 80–100)
MONOCYTES # BLD AUTO: 0.51 X10*3/UL (ref 0.1–1)
MONOCYTES NFR BLD AUTO: 6.3 %
NEUTROPHILS # BLD AUTO: 6.3 X10*3/UL (ref 1.2–7.7)
NEUTROPHILS NFR BLD AUTO: 78.4 %
NRBC BLD-RTO: 0 /100 WBCS (ref 0–0)
PLATELET # BLD AUTO: 204 X10*3/UL (ref 150–450)
POTASSIUM SERPL-SCNC: 2.9 MMOL/L (ref 3.5–5.3)
PROT SERPL-MCNC: 7.6 G/DL (ref 6.4–8.2)
PROTHROMBIN TIME: 13.7 SECONDS (ref 9.8–12.8)
RBC # BLD AUTO: 4.18 X10*6/UL (ref 4–5.2)
SODIUM SERPL-SCNC: 141 MMOL/L (ref 136–145)
WBC # BLD AUTO: 8 X10*3/UL (ref 4.4–11.3)

## 2023-11-03 PROCEDURE — 84484 ASSAY OF TROPONIN QUANT: CPT | Mod: 59 | Performed by: EMERGENCY MEDICINE

## 2023-11-03 PROCEDURE — 84484 ASSAY OF TROPONIN QUANT: CPT | Performed by: EMERGENCY MEDICINE

## 2023-11-03 PROCEDURE — 96367 TX/PROPH/DG ADDL SEQ IV INF: CPT

## 2023-11-03 PROCEDURE — 96365 THER/PROPH/DIAG IV INF INIT: CPT

## 2023-11-03 PROCEDURE — 85025 COMPLETE CBC W/AUTO DIFF WBC: CPT | Performed by: EMERGENCY MEDICINE

## 2023-11-03 PROCEDURE — 85610 PROTHROMBIN TIME: CPT | Performed by: EMERGENCY MEDICINE

## 2023-11-03 PROCEDURE — 2500000001 HC RX 250 WO HCPCS SELF ADMINISTERED DRUGS (ALT 637 FOR MEDICARE OP): Performed by: EMERGENCY MEDICINE

## 2023-11-03 PROCEDURE — 83605 ASSAY OF LACTIC ACID: CPT | Performed by: EMERGENCY MEDICINE

## 2023-11-03 PROCEDURE — 36415 COLL VENOUS BLD VENIPUNCTURE: CPT | Performed by: EMERGENCY MEDICINE

## 2023-11-03 PROCEDURE — 71045 X-RAY EXAM CHEST 1 VIEW: CPT | Performed by: RADIOLOGY

## 2023-11-03 PROCEDURE — 99284 EMERGENCY DEPT VISIT MOD MDM: CPT | Mod: 25 | Performed by: EMERGENCY MEDICINE

## 2023-11-03 PROCEDURE — 2500000004 HC RX 250 GENERAL PHARMACY W/ HCPCS (ALT 636 FOR OP/ED): Performed by: EMERGENCY MEDICINE

## 2023-11-03 PROCEDURE — 83880 ASSAY OF NATRIURETIC PEPTIDE: CPT | Performed by: EMERGENCY MEDICINE

## 2023-11-03 PROCEDURE — 96366 THER/PROPH/DIAG IV INF ADDON: CPT

## 2023-11-03 PROCEDURE — 83735 ASSAY OF MAGNESIUM: CPT | Performed by: EMERGENCY MEDICINE

## 2023-11-03 PROCEDURE — 71045 X-RAY EXAM CHEST 1 VIEW: CPT

## 2023-11-03 PROCEDURE — 80053 COMPREHEN METABOLIC PANEL: CPT | Performed by: EMERGENCY MEDICINE

## 2023-11-03 RX ORDER — POTASSIUM CHLORIDE 20 MEQ/1
20 TABLET, EXTENDED RELEASE ORAL ONCE
Status: COMPLETED | OUTPATIENT
Start: 2023-11-03 | End: 2023-11-03

## 2023-11-03 RX ORDER — MAGNESIUM SULFATE HEPTAHYDRATE 40 MG/ML
2 INJECTION, SOLUTION INTRAVENOUS ONCE
Status: COMPLETED | OUTPATIENT
Start: 2023-11-03 | End: 2023-11-03

## 2023-11-03 RX ORDER — LIDOCAINE HYDROCHLORIDE 20 MG/ML
1.25 SOLUTION OROPHARYNGEAL ONCE
Status: COMPLETED | OUTPATIENT
Start: 2023-11-03 | End: 2023-11-03

## 2023-11-03 RX ORDER — POTASSIUM CHLORIDE 14.9 MG/ML
20 INJECTION INTRAVENOUS
Status: COMPLETED | OUTPATIENT
Start: 2023-11-03 | End: 2023-11-03

## 2023-11-03 RX ORDER — OXYMETAZOLINE HCL 0.05 %
2 SPRAY, NON-AEROSOL (ML) NASAL EVERY 12 HOURS PRN
Status: DISCONTINUED | OUTPATIENT
Start: 2023-11-03 | End: 2023-11-03 | Stop reason: HOSPADM

## 2023-11-03 RX ADMIN — SODIUM CHLORIDE 500 ML: 9 INJECTION, SOLUTION INTRAVENOUS at 14:44

## 2023-11-03 RX ADMIN — POTASSIUM CHLORIDE 20 MEQ: 14.9 INJECTION, SOLUTION INTRAVENOUS at 16:37

## 2023-11-03 RX ADMIN — MAGNESIUM SULFATE HEPTAHYDRATE 2 G: 40 INJECTION, SOLUTION INTRAVENOUS at 13:39

## 2023-11-03 RX ADMIN — OXYMETAZOLINE HYDROCHLORIDE 2 SPRAY: 0.05 SPRAY NASAL at 12:02

## 2023-11-03 RX ADMIN — OXYMETAZOLINE HYDROCHLORIDE 2 SPRAY: 0.05 SPRAY NASAL at 11:57

## 2023-11-03 RX ADMIN — POTASSIUM CHLORIDE 20 MEQ: 14.9 INJECTION, SOLUTION INTRAVENOUS at 14:41

## 2023-11-03 RX ADMIN — POTASSIUM CHLORIDE 20 MEQ: 1500 TABLET, EXTENDED RELEASE ORAL at 13:39

## 2023-11-03 RX ADMIN — LIDOCAINE HYDROCHLORIDE 1.25 ML: 20 SOLUTION ORAL at 11:56

## 2023-11-03 ASSESSMENT — PAIN SCALES - GENERAL
PAINLEVEL_OUTOF10: 0 - NO PAIN

## 2023-11-03 ASSESSMENT — PAIN - FUNCTIONAL ASSESSMENT
PAIN_FUNCTIONAL_ASSESSMENT: 0-10
PAIN_FUNCTIONAL_ASSESSMENT: 0-10

## 2023-11-03 ASSESSMENT — COLUMBIA-SUICIDE SEVERITY RATING SCALE - C-SSRS
6. HAVE YOU EVER DONE ANYTHING, STARTED TO DO ANYTHING, OR PREPARED TO DO ANYTHING TO END YOUR LIFE?: NO
2. HAVE YOU ACTUALLY HAD ANY THOUGHTS OF KILLING YOURSELF?: NO
1. IN THE PAST MONTH, HAVE YOU WISHED YOU WERE DEAD OR WISHED YOU COULD GO TO SLEEP AND NOT WAKE UP?: NO

## 2023-11-03 ASSESSMENT — LIFESTYLE VARIABLES
HAVE PEOPLE ANNOYED YOU BY CRITICIZING YOUR DRINKING: NO
EVER HAD A DRINK FIRST THING IN THE MORNING TO STEADY YOUR NERVES TO GET RID OF A HANGOVER: NO
EVER FELT BAD OR GUILTY ABOUT YOUR DRINKING: NO
HAVE YOU EVER FELT YOU SHOULD CUT DOWN ON YOUR DRINKING: NO
REASON UNABLE TO ASSESS: NO

## 2023-11-03 NOTE — TELEPHONE ENCOUNTER
Patient left message this am stating she was having a nose bleed and is on Brilinta. Looking for instructions. I heard recorded message and saw in chart patient has already proceeded to ER .

## 2023-11-03 NOTE — ED PROVIDER NOTES
HPI   Chief Complaint   Patient presents with    Epistaxis (Nose Bleed)     Since 0930 when she woke up. No active bleeding noted at this time       HPI: 63-year-old female states that approximately a month ago she had a STEMI and had stents put in.  She states that it was done here.  She states that since then she has been on Brilinta.  She states that this morning she noticed that she was having some blood from her nose.  Family states that it was after she sneezed.  Patient states that she has been feeling a little bit weak ever since she had the stent.  She states that she is supposed to start cardiac rehab.  No chest pain.  No shortness of breath.  She has not fallen.  No headache.  No vision changes.  No numbness tingling or weakness.  No difficulty speaking or swallowing.    Family HX: Denies any significant/pertinent family history.  Social Hx: Denies ETOH or drug use.  Review of systems:  Gen.: No weight loss, fatigue, anorexia, insomnia, fever.   Eyes: No vision loss, double vision, drainage, eye pain.   ENT: No pharyngitis, dry mouth.   Cardiac: No chest pain, palpitations, syncope, near syncope.   Pulmonary: No shortness of breath, cough, hemoptysis.   Heme/lymph: No swollen glands, fever, bleeding.   GI: No abdominal pain, change in bowel habits, melena, hematemesis, hematochezia, nausea, vomiting, diarrhea.   : No discharge, dysuria, frequency, urgency, hematuria.   Musculoskeletal: No limb pain, joint pain, joint swelling.   Skin: No rashes.   Psych: No depression, anxiety, suicidality, homicidality.   Review of systems is otherwise negative unless stated above or in history of present illness.    Physical Exam:    Appearance: Alert, oriented , cooperative,  in no acute distress. Well nourished & well hydrated.    Skin: Intact,  dry skin, no lesions, rash, petechiae or purpura.     Eyes: PERRLA, EOMs intact,  Conjunctiva pink with no redness or exudates. Eyelids without lesions. No scleral icterus.      ENT: Hearing grossly intact. External auditory canals patent, tympanic membranes intact with visible landmarks. Nares patent, mucus membranes moist. Dentition without lesions. Pharynx clear, uvula midline.  minimal blood from the right anterior nare.    Neck: Supple, without meningismus. Thyroid not palpable. Trachea at midline. No lymphadenopathy.    Pulmonary: Clear bilaterally with good chest wall excursion. No rales, rhonchi or wheezing. No accessory muscle use or stridor.    Cardiac: Normal S1, S2 without murmur, rub, gallop or extrasystole. No JVD, Carotids without bruits.    Abdomen: Soft, nontender, active bowel sounds.  No palpable organomegaly.  No rebound or guarding.  No CVA tenderness.    Genitourinary: Exam deferred.    Musculoskeletal: Full range of motion. no pain, edema, or deformity. Pulses full and equal. No cyanosis, clubbing, or edema.    Neurological:  Cranial nerves II through XII are grossly intact, finger-nose touch is normal, normal sensation, no weakness, no focal findings identified.    Psychiatric: Appropriate mood and affect.     Medical Decision-Making:  Testing: EKG was obtained which is interpreted by me shows a sinus rhythm rate of 62 without evidence of obvious ST elevations or T wave inversions to indicate acute ischemia or infarct.  Patient was found to be hypokalemic and hypomagnesemic.  I was able to get the nosebleed to stop with some Afrin.  She was given IV magnesium and p.o. and IV potassium replacement.  We did discuss admission however patient stated she would really prefer to be discharged home.  Therefore at this time she was discharged as per her request.  She should have a low threshold to return.  Follow-up with her PCP.  She needs to have her potassium and her magnesium rechecked.  Return for worsening symptoms, chest pain shortness of breath dizziness syncope or any other concerns.  Treatment:   Reevaluation:   Plan: Homegoing. Discussed differential. Will  follow-up with the primary physician in the next 2-3 days. Return if worse. They understand return precautions and discharge instructions. Patient and family/friend/caregiver are in agreement with this plan.   Impression:   1.  Epistaxis  2.  Hypomagnesemia  3.  Hypokalemia    Labs Reviewed  CBC WITH AUTO DIFFERENTIAL - Abnormal     WBC                           8.0                    nRBC                          0.0                    RBC                           4.18                   Hemoglobin                    9.8 (*)                Hematocrit                    31.8 (*)               MCV                           76 (*)                 MCH                           23.4 (*)               MCHC                          30.8 (*)               RDW                           18.2 (*)               Platelets                     204                    Neutrophils %                 78.4                   Immature Granulocytes %, Automated   0.4                    Lymphocytes %                 10.2                   Monocytes %                   6.3                    Eosinophils %                 4.0                    Basophils %                   0.7                    Neutrophils Absolute          6.30                   Immature Granulocytes Absolute, Au*   0.03                   Lymphocytes Absolute          0.82 (*)               Monocytes Absolute            0.51                   Eosinophils Absolute          0.32                   Basophils Absolute            0.06                COMPREHENSIVE METABOLIC PANEL - Abnormal     Glucose                       112 (*)                Sodium                        141                    Potassium                     2.9 (*)                Chloride                      103                    Bicarbonate                   26                     Anion Gap                     15                     Urea Nitrogen                 27 (*)                 Creatinine                     1.06 (*)               eGFR                          58 (*)                 Calcium                       8.2 (*)                Albumin                       3.8                    Alkaline Phosphatase          47                     Total Protein                 7.6                    AST                           17                     Bilirubin, Total              0.4                    ALT                           15                  MAGNESIUM - Abnormal     Magnesium                     1.13 (*)            PROTIME-INR - Abnormal     Protime                       13.7 (*)               INR                           1.2 (*)             LACTATE - Normal     Lactate                       1.4                      Narrative: Venipuncture immediately after or during the administration of Metamizole may lead to falsely low results. Testing should be performed immediately                prior to Metamizole dosing.  B-TYPE NATRIURETIC PEPTIDE - Normal     BNP                           65                       Narrative:    <100 pg/mL - Heart failure unlikely                100-299 pg/mL - Intermediate probability of acute heart                                failure exacerbation. Correlate with clinical                                context and patient history.                  >=300 pg/mL - Heart Failure likely. Correlate with clinical                                context and patient history.                                BNP testing is performed using different testing methodology at [facility] than at other [facility]. Direct result comparisons should only be made within the same method.                   SERIAL TROPONIN-INITIAL - Normal     Troponin I, High Sensitivity   5                        Narrative: Less than 99th percentile of normal range cutoff-                Female and children under 18 years old <14 ng/L; Male <21 ng/L: Negative                Repeat testing should be  performed if clinically indicated.                                 Female and children under 18 years old 14-50 ng/L; Male 21-50 ng/L:                Consistent with possible cardiac damage and possible increased clinical                 risk. Serial measurements may help to assess extent of myocardial damage.                                 >50 ng/L: Consistent with cardiac damage, increased clinical risk and                myocardial infarction. Serial measurements may help assess extent of                 myocardial damage.                                  NOTE: Children less than 1 year old may have higher baseline troponin                 levels and results should be interpreted in conjunction with the overall                 clinical context.                                 NOTE: Troponin I testing is performed using a different                 testing methodology at [facility] than at other                 [facility]. Direct result comparisons should only                 be made within the same method.  SERIAL TROPONIN, 1 HOUR - Normal     Troponin I, High Sensitivity   6                        Narrative: Less than 99th percentile of normal range cutoff-                Female and children under 18 years old <14 ng/L; Male <21 ng/L: Negative                Repeat testing should be performed if clinically indicated.                                 Female and children under 18 years old 14-50 ng/L; Male 21-50 ng/L:                Consistent with possible cardiac damage and possible increased clinical                 risk. Serial measurements may help to assess extent of myocardial damage.                                 >50 ng/L: Consistent with cardiac damage, increased clinical risk and                myocardial infarction. Serial measurements may help assess extent of                 myocardial damage.                                  NOTE: Children less than 1 year old may have higher  baseline troponin                 levels and results should be interpreted in conjunction with the overall                 clinical context.                                 NOTE: Troponin I testing is performed using a different                 testing methodology at [facility] than at other                 [facility] hospitals. Direct result comparisons should only                 be made within the same method.  TROPONIN SERIES- (INITIAL, 1 HR)     XR chest 1 view   Final Result    1.  Stable chest. See discussion above.                      MACRO:    None          Signed by: Joseph Schoenberger 11/3/2023 12:06 PM    Dictation workstation:   [id number]                               No data recorded                Patient History   Past Medical History:   Diagnosis Date    Anxiety     Coronary artery disease     Hyperlipidemia     STEMI (ST elevation myocardial infarction) (CMS/[facility])      Past Surgical History:   Procedure Laterality Date    CARDIAC CATHETERIZATION N/A 10/2/2023    Procedure: PCI CARLOS ALBERTO Stent- Coronary;  Surgeon: Nish Parnell MD;  Location: [facility];  Service: Cardiovascular;  Laterality: N/A;  RCA PCI. Please schedule to follow my 2nd case of the afternoon    CARDIAC CATHETERIZATION N/A 10/2/2023    Procedure: IVUS - Coronary;  Surgeon: Nish Parnell MD;  Location: [facility];  Service: Cardiovascular;  Laterality: N/A;    CARDIAC CATHETERIZATION N/A 10/1/2023    Procedure: Left Heart Cath, No LV;  Surgeon: Nish Parnell MD;  Location: [facility];  Service: Cardiovascular;  Laterality: N/A;    CARDIAC CATHETERIZATION N/A 10/1/2023    Procedure: PCI;  Surgeon: Nish Parnell MD;  Location: [facility];  Service: Cardiovascular;  Laterality: N/A;    CARDIAC CATHETERIZATION N/A 10/6/2023    Procedure: Left Heart Cath;  Surgeon: Nish Parnell MD;  Location: [facility];   Service: Cardiovascular;  Laterality: N/A;    CARDIAC CATHETERIZATION N/A 10/6/2023    Procedure: PCI CARLOS ALBERTO Stent- Coronary;  Surgeon: Nish Parnell MD;  Location: [facility];  Service: Cardiovascular;  Laterality: N/A;  RCA PCI with Shockwave. Please schedule at 9 am if possible    CARDIAC CATHETERIZATION N/A 10/6/2023    Procedure: IVUS - Coronary;  Surgeon: Nish Parnell MD;  Location: [facility];  Service: Cardiovascular;  Laterality: N/A;    CORONARY ANGIOPLASTY WITH STENT PLACEMENT      US ASPIRATION INJECTION INTERMEDIATE JOINT  2019    US ASPIRATION INJECTION INTERMEDIATE JOINT 2019 [facility]    US GUIDED THYROID BIOPSY  2019    US GUIDED THYROID BIOPSY 2019 [facility]     Family History   Problem Relation Name Age of Onset    Heart attack Father  65    Heart disease Paternal Grandmother  65     Social History     Tobacco Use    Smoking status: Former     Packs/day: 1.00     Years: 15.00     Additional pack years: 0.00     Total pack years: 15.00     Types: Cigarettes     Quit date: 2023     Years since quittin.0    Smokeless tobacco: Never   Vaping Use    Vaping Use: Never used   Substance Use Topics    Alcohol use: Never    Drug use: Never       Physical Exam   ED Triage Vitals [23 1107]   Temp Heart Rate Resp BP   36.6 °C (97.9 °F) 82 18 109/57      SpO2 Temp src Heart Rate Source Patient Position   98 % -- -- --      BP Location FiO2 (%)     -- --       Physical Exam    ED Course & MDM   Diagnoses as of 23   Epistaxis   Hypokalemia   Hypomagnesemia       Medical Decision Making      Procedure  Procedures     Danuta Hammond MD  23

## 2023-11-04 ENCOUNTER — HOSPITAL ENCOUNTER (OUTPATIENT)
Dept: CARDIOLOGY | Facility: HOSPITAL | Age: 63
Discharge: HOME | End: 2023-11-04
Payer: COMMERCIAL

## 2023-11-04 PROCEDURE — 93005 ELECTROCARDIOGRAM TRACING: CPT

## 2023-11-06 ENCOUNTER — TELEPHONE (OUTPATIENT)
Dept: CARDIOLOGY | Facility: CLINIC | Age: 63
End: 2023-11-06
Payer: COMMERCIAL

## 2023-11-06 NOTE — TELEPHONE ENCOUNTER
Pt left message stating that she was in the hospital on Friday for nose bleed.  Per pt her potassium and magnesium levels were extremely low.  Per pt she was told that she is luck she came in as she could have went in to cardiac arrest. Per labs in chart pt's potassium was 2.9 and magnesium was 1.13.    Pt is asking when her lab work should be rechecked.      Please review with physician and follow up with pt.

## 2023-11-07 NOTE — TELEPHONE ENCOUNTER
Has follow up with Dr. Nish Mendes MD [facility] on 11/20.   Will route for input on when to repeat labs. Thanks

## 2023-11-09 ENCOUNTER — TRANSCRIBE ORDERS (OUTPATIENT)
Dept: CARDIAC REHAB | Facility: HOSPITAL | Age: 63
End: 2023-11-09
Payer: COMMERCIAL

## 2023-11-09 ENCOUNTER — LAB (OUTPATIENT)
Dept: LAB | Facility: LAB | Age: 63
End: 2023-11-09
Payer: COMMERCIAL

## 2023-11-09 ENCOUNTER — CLINICAL SUPPORT (OUTPATIENT)
Dept: CARDIAC REHAB | Facility: HOSPITAL | Age: 63
End: 2023-11-09
Payer: COMMERCIAL

## 2023-11-09 ENCOUNTER — OFFICE VISIT (OUTPATIENT)
Dept: PRIMARY CARE | Facility: CLINIC | Age: 63
End: 2023-11-09
Payer: COMMERCIAL

## 2023-11-09 VITALS
TEMPERATURE: 96.9 F | DIASTOLIC BLOOD PRESSURE: 70 MMHG | SYSTOLIC BLOOD PRESSURE: 105 MMHG | HEART RATE: 63 BPM | HEIGHT: 59 IN | BODY MASS INDEX: 31.85 KG/M2 | WEIGHT: 158 LBS

## 2023-11-09 DIAGNOSIS — Z95.5 STATUS POST CORONARY ARTERY STENT PLACEMENT: ICD-10-CM

## 2023-11-09 DIAGNOSIS — R04.0 EPISTAXIS: ICD-10-CM

## 2023-11-09 DIAGNOSIS — I21.29: ICD-10-CM

## 2023-11-09 DIAGNOSIS — E87.6 HYPOKALEMIA: ICD-10-CM

## 2023-11-09 DIAGNOSIS — Z95.5 STATUS POST CORONARY ARTERY STENT PLACEMENT: Primary | ICD-10-CM

## 2023-11-09 DIAGNOSIS — N18.31 STAGE 3A CHRONIC KIDNEY DISEASE (MULTI): Primary | ICD-10-CM

## 2023-11-09 DIAGNOSIS — E83.42 HYPOMAGNESEMIA: ICD-10-CM

## 2023-11-09 PROBLEM — F41.9 ANXIETY: Status: ACTIVE | Noted: 2021-08-30

## 2023-11-09 LAB
MAGNESIUM SERPL-MCNC: 1.3 MG/DL (ref 1.6–2.4)
POTASSIUM SERPL-SCNC: 3.3 MMOL/L (ref 3.5–5.3)

## 2023-11-09 PROCEDURE — 1159F MED LIST DOCD IN RCRD: CPT | Performed by: INTERNAL MEDICINE

## 2023-11-09 PROCEDURE — 3074F SYST BP LT 130 MM HG: CPT | Performed by: INTERNAL MEDICINE

## 2023-11-09 PROCEDURE — 1126F AMNT PAIN NOTED NONE PRSNT: CPT | Performed by: INTERNAL MEDICINE

## 2023-11-09 PROCEDURE — 84132 ASSAY OF SERUM POTASSIUM: CPT

## 2023-11-09 PROCEDURE — 4004F PT TOBACCO SCREEN RCVD TLK: CPT | Performed by: INTERNAL MEDICINE

## 2023-11-09 PROCEDURE — 93798 PHYS/QHP OP CAR RHAB W/ECG: CPT | Performed by: INTERNAL MEDICINE

## 2023-11-09 PROCEDURE — 3008F BODY MASS INDEX DOCD: CPT | Performed by: INTERNAL MEDICINE

## 2023-11-09 PROCEDURE — 99213 OFFICE O/P EST LOW 20 MIN: CPT | Performed by: INTERNAL MEDICINE

## 2023-11-09 PROCEDURE — 83735 ASSAY OF MAGNESIUM: CPT

## 2023-11-09 PROCEDURE — 3078F DIAST BP <80 MM HG: CPT | Performed by: INTERNAL MEDICINE

## 2023-11-09 PROCEDURE — 1160F RVW MEDS BY RX/DR IN RCRD: CPT | Performed by: INTERNAL MEDICINE

## 2023-11-09 PROCEDURE — 36415 COLL VENOUS BLD VENIPUNCTURE: CPT

## 2023-11-09 ASSESSMENT — ENCOUNTER SYMPTOMS
ARTHRALGIAS: 0
EYES NEGATIVE: 1
COUGH: 0
DIZZINESS: 0
SHORTNESS OF BREATH: 0
GASTROINTESTINAL NEGATIVE: 1
CONSTITUTIONAL NEGATIVE: 1
BRUISES/BLEEDS EASILY: 1

## 2023-11-09 NOTE — PROGRESS NOTES
"Subjective   Patient ID: Audrey Estrada is a 63 y.o. female who presents for Follow-up ([facility] for nose bleeds).    Epistaxis (Nose Bleed)   The bleeding has been from the right nare. This is a new problem. The current episode started in the past 7 days. The problem occurs every several days. The problem has been resolved. The bleeding is associated with aspirin and anticoagulants. She has tried pressure for the symptoms. The treatment provided significant relief. There is no history of a bleeding disorder or sinus problems.        Review of Systems   Constitutional: Negative.    HENT:  Positive for nosebleeds.    Eyes: Negative.    Respiratory:  Negative for cough and shortness of breath.    Cardiovascular:  Negative for chest pain.   Gastrointestinal: Negative.    Musculoskeletal:  Negative for arthralgias.   Skin: Negative.    Neurological:  Negative for dizziness.   Hematological:  Bruises/bleeds easily.       Objective   /70 (BP Location: Left arm, Patient Position: Sitting, BP Cuff Size: Adult)   Pulse 63   Temp 36.1 °C (96.9 °F) (Temporal)   Ht 1.499 m (4' 11\")   Wt 71.7 kg (158 lb)   BMI 31.91 kg/m²     Physical Exam  Vitals reviewed.   Constitutional:       Appearance: Normal appearance. She is obese.   HENT:      Head: Normocephalic.   Eyes:      Conjunctiva/sclera: Conjunctivae normal.      Comments: pallor   Cardiovascular:      Rate and Rhythm: Normal rate and regular rhythm.      Pulses: Normal pulses.   Pulmonary:      Effort: Pulmonary effort is normal.      Breath sounds: Normal breath sounds.   Abdominal:      Palpations: Abdomen is soft.   Musculoskeletal:         General: Normal range of motion.      Cervical back: Neck supple.   Skin:     General: Skin is warm and dry.   Neurological:      General: No focal deficit present.   Psychiatric:         Mood and Affect: Mood normal.       Assessment/Plan   Problem List Items Addressed This Visit             ICD-10-CM    Stage 3a chronic kidney " disease (CMS/HCC) - Primary N18.31     Other Visit Diagnoses         Codes    Epistaxis     R04.0    Hypokalemia     E87.6    Hypomagnesemia     E83.42        She was in ER, she has epistaxis, epistaxis happened because of her Brilinta treatment, it was controlled, hemoglobin was 9.3, interestingly she was hypokalemic and hypomagnesemic, I told her to discontinue hydrochlorothiazide, she is not taking iron, she has a myocardial infarction recently, Brilinta cannot be stopped, she needs to keep Afrin nasal spray with her in case epistaxis happen she is to pinch her nose on the top, topical Vaseline application also can be beneficial, we will check potassium and magnesium and if still potassium is low then potassium supplement should be considered, she has a stage IIIa CKD and recent creatinine was reviewed, she was encouraged to have a follow-up with me as scheduled.        No

## 2023-11-10 DIAGNOSIS — E87.6 HYPOKALEMIA: Primary | ICD-10-CM

## 2023-11-10 RX ORDER — POTASSIUM CHLORIDE 750 MG/1
10 TABLET, FILM COATED, EXTENDED RELEASE ORAL DAILY
Qty: 30 TABLET | Refills: 6 | Status: SHIPPED | OUTPATIENT
Start: 2023-11-10 | End: 2024-04-14

## 2023-11-10 RX ORDER — LANOLIN ALCOHOL/MO/W.PET/CERES
400 CREAM (GRAM) TOPICAL DAILY
Qty: 30 TABLET | Refills: 6 | Status: SHIPPED | OUTPATIENT
Start: 2023-11-10 | End: 2024-11-09

## 2023-11-13 ENCOUNTER — CLINICAL SUPPORT (OUTPATIENT)
Dept: CARDIAC REHAB | Facility: HOSPITAL | Age: 63
End: 2023-11-13
Payer: COMMERCIAL

## 2023-11-13 DIAGNOSIS — Z95.5 STATUS POST CORONARY ARTERY STENT PLACEMENT: ICD-10-CM

## 2023-11-13 DIAGNOSIS — I21.29: ICD-10-CM

## 2023-11-13 PROCEDURE — 93798 PHYS/QHP OP CAR RHAB W/ECG: CPT | Performed by: INTERNAL MEDICINE

## 2023-11-14 NOTE — TELEPHONE ENCOUNTER
Appears Dr. Marshal MD is managing K level.   Will close this note and keep for follow up with Dr. Nish Mendes MD [facility] on 11/20 as FYI.     Lab Results   Component Value Date    K 3.3 (L) 11/09/2023

## 2023-11-15 ENCOUNTER — TELEPHONE (OUTPATIENT)
Dept: CARDIOLOGY | Facility: CLINIC | Age: 63
End: 2023-11-15
Payer: COMMERCIAL

## 2023-11-15 NOTE — TELEPHONE ENCOUNTER
Cardiac Rehab orders received, placed for signature by Dr. Dick MD [facility] and faxed to cardiac rehab with confirmation received.

## 2023-11-16 ENCOUNTER — CLINICAL SUPPORT (OUTPATIENT)
Dept: CARDIAC REHAB | Facility: HOSPITAL | Age: 63
End: 2023-11-16
Payer: COMMERCIAL

## 2023-11-16 DIAGNOSIS — Z95.5 STATUS POST CORONARY ARTERY STENT PLACEMENT: ICD-10-CM

## 2023-11-16 DIAGNOSIS — I21.29: ICD-10-CM

## 2023-11-16 PROCEDURE — 93798 PHYS/QHP OP CAR RHAB W/ECG: CPT | Performed by: INTERNAL MEDICINE

## 2023-11-20 ENCOUNTER — APPOINTMENT (OUTPATIENT)
Dept: CARDIOLOGY | Facility: CLINIC | Age: 63
End: 2023-11-20
Payer: COMMERCIAL

## 2023-11-20 ENCOUNTER — CLINICAL SUPPORT (OUTPATIENT)
Dept: CARDIAC REHAB | Facility: HOSPITAL | Age: 63
End: 2023-11-20
Payer: COMMERCIAL

## 2023-11-20 DIAGNOSIS — I21.29: ICD-10-CM

## 2023-11-20 DIAGNOSIS — Z95.5 STATUS POST CORONARY ARTERY STENT PLACEMENT: ICD-10-CM

## 2023-11-20 PROCEDURE — 93798 PHYS/QHP OP CAR RHAB W/ECG: CPT | Performed by: INTERNAL MEDICINE

## 2023-11-22 NOTE — PROGRESS NOTES
"Name: Audrey Estrada   : 1960   Diagnosis: STEMI, PTCA  MRN: [id number]   Onset Date: 10/01/23    Today's Date: 23          INDIVIDUAL CARDIAC TREATMENT PLAN-30 day ASSESSMENT     Name: Audrey Estrada   Today's Date: 23   : 1960   Primary Provider: Marshal  MRN: [id number]   Referring Physician: Nereida     Diagnosis: PTCA, MI    Onset Date: 10/01/23      Risk Stratification: Moderate      NUTRITION ASSESSMENT  Lipids:   Lipid Lab Date: 10/01/2023  Total Chol: 158  HDL: 28.7  LDL: 92  Tri  Cholesterol Med: Atorvastatin    Diabetes: No      Weight Management  Weight: 160.6 lbs  Height: 4'11\"  BMI:  32.4  Current Diet: Regular  Barriers to dietary change: none    Initial Dietary Assessment Score: 39%      NUTRITION PLAN  Nutrition Goals:   1. Improve Picture Your Plate assessment results by discharge.  2. Make changes to diet to include heart healthy options while in the program.    Nutrition Intervention/Education:   *Recommend diet consult with dietician   *Perform weekly weight checks at rehab.   *Patient attended class with the dietician on food labels, portions, and eating out.  *Patient attended class with the dietician and discussed BMI, calorie intake, and weight loss.    OTHER CORE COMPONENTS/ RISK FACTORS ASSESSMENT  Medication compliance: good compliance  Using pill box: No  Carries medication list: Yes    Blood Pressure Management:  History of High BP: Yes  Resting BP: 136/68    Tobacco: RECENT (quit <6 months)  Quit Date: 2023  Anyone in the house smoke: N/A    Initial Knowledge Test Score:11/15    OTHER CORE COMPONENTS/ RISK FACTOR PLAN   Other Core components/Risk Factor Goals:                                                                                                                                                      1. Achieve and maintain a resting blood pressure less than 130/80 while in the program.  2. Gain knowledge of cardiac disease and lifestyle " modifications related to exercise and ADL's prior to discharge.  3. Remain smoke free while in the program  Other Components/ Risk Factors Intervention/Education:  *Will monitor HR, BP, dyspnea and arrhythmias each session.   * Patient given smoking cessation packet and patient reports remaining smoke free.  *Patient given the 4 D's to lessen the physical urge to smoke        PSYCHOSOCIAL ASSESSMENT  Patient reported stress level: moderate  Using stress management skills: Yes  HX of anxiety: Yes  HX of depression: Yes  Patient questioned regarding any new stress, depression and anxiety symptoms: Yes    Family/Support System:  and kids  Seeing mental health provider: No  Psychosocial medications: Prozac    Initial PHQ-9 score: 20  Was PHQ-9 faxed to provider: Yes  Date faxed: 11/02/23    Quality of Life Survey: To be scored  PCS:   MCS:    Status of change:  Action    PSYCHOSOCIAL PLAN  Psychosocial Goals:  1. Improve stage of change while in the program.  2. Identify personal stressors and begin strategies for managing stress by discharge   3. Maintain or lower PHQ-9 survey score     Psychosocial Interventions/ Education:  * Encourage to attend stress management educational class   *Patient is able to visit with the program counselor at any given time upon request while enrolled in the program. Will recheck a MID PHQ-9.  *Patient attended class on behavior change and discussed SMART goals.    EXERCISE ASSESSMENT  Home Exercise: No    EXERCISE PLAN  Exercise Goals:   1. To increase MET level by 5-10% each week or as tolerated.  2. Have a plan in place for continued exercise after the program by discharge.  3. Increase exercise duration to 30-45 minutes each session     Exercise Prescription:   No pre rehab GXT  Frequency: 2 days per week  Duration (total aerobic min.): 30-40 minutes  Intensity RPE: 11-14    Date of first exercise session: 11/09/2023     Modality METS Load  Duration   1 Warm Up    05:00   2  Recumbent bike 3.2 METS   10:00   3 NuStep 2.1 METS 32 salazar 2 15:00   4 Cardiostrider 1.7 METS 40 SPM 1 10:00   5 Weights  3 LBS  10:00   6 Education    10:00   7 Cool Down     05:00     Exercise Intervention:   *To increase MET level by 5-10% each week   *Continued education on equipment use.  *Trying new modalities.    Exercise Progression: Patient is able to exercise 30-40 minutes between different modalities. She reports no angina with exercise and has increased her time on equipment each week. Will begin increasing resistance/speed levels as tolerated.         LEARNING ASSESSMENT & BARRIERS  Readiness to Learn: Yes  Barriers: None  Comments:    FALL RISK  low  Comments:        INDIVIDUAL PATIENT GOALS:  1.To feel better  2. To get stronger        MEDICATIONS  Current Outpatient Medications   Medication Instructions    aspirin 81 mg, oral, Daily    atorvastatin (LIPITOR) 80 mg, oral, Daily    carvedilol (COREG) 25 mg, oral, 2 times daily    cholecalciferol (VITAMIN D-3) 25 mcg, oral, Daily    FLUoxetine (PROZAC) 20 mg, oral, Daily    losartan (COZAAR) 100 mg, oral, Daily    magnesium oxide (MAGOX) 400 mg, oral, Daily    omega-3 acid ethyl esters (Lovaza) 1 gram capsule TAKE 1 CAPSULE BY MOUTH TWICE A DAY    omeprazole OTC (PRILOSEC OTC) 1 mg, oral, Daily before breakfast, Do not crush, chew, or split.    potassium chloride CR (Klor-Con) 10 mEq ER tablet 10 mEq, oral, Daily, Do not crush, chew, or split.    ticagrelor (BRILINTA) 90 mg, oral, 2 times daily           STAFF COMMENTS: Patient reported going to the ER prior to her first session of cardiac rehab starting with a nose bleed and found to have Hypokalemia and Hypomagnesemia, which was treated. Patient has been exercising 30-40 minutes between equipment and reports no angina with exercise. She is engaging with staff and her classmates and attending educational classes. Discussed with patient about meeting with the dietician individually and she was  interested. Staff to give dietician info to arrange. Patient reports she continues to be smoke free. Will continue with exercise and education.

## 2023-11-27 ENCOUNTER — APPOINTMENT (OUTPATIENT)
Dept: CARDIAC REHAB | Facility: HOSPITAL | Age: 63
End: 2023-11-27
Payer: COMMERCIAL

## 2023-11-28 ENCOUNTER — PATIENT OUTREACH (OUTPATIENT)
Dept: CARE COORDINATION | Facility: CLINIC | Age: 63
End: 2023-11-28
Payer: COMMERCIAL

## 2023-11-30 ENCOUNTER — LAB (OUTPATIENT)
Dept: LAB | Facility: LAB | Age: 63
End: 2023-11-30
Payer: COMMERCIAL

## 2023-11-30 ENCOUNTER — CLINICAL SUPPORT (OUTPATIENT)
Dept: CARDIAC REHAB | Facility: HOSPITAL | Age: 63
End: 2023-11-30
Payer: COMMERCIAL

## 2023-11-30 ENCOUNTER — OFFICE VISIT (OUTPATIENT)
Dept: PRIMARY CARE | Facility: CLINIC | Age: 63
End: 2023-11-30
Payer: COMMERCIAL

## 2023-11-30 VITALS
DIASTOLIC BLOOD PRESSURE: 73 MMHG | TEMPERATURE: 96 F | WEIGHT: 163 LBS | HEART RATE: 66 BPM | HEIGHT: 59 IN | SYSTOLIC BLOOD PRESSURE: 135 MMHG | BODY MASS INDEX: 32.86 KG/M2

## 2023-11-30 DIAGNOSIS — E87.6 HYPOKALEMIA: ICD-10-CM

## 2023-11-30 DIAGNOSIS — R09.89 CAROTID BRUIT, UNSPECIFIED LATERALITY: ICD-10-CM

## 2023-11-30 DIAGNOSIS — Z95.5 STATUS POST CORONARY ARTERY STENT PLACEMENT: ICD-10-CM

## 2023-11-30 DIAGNOSIS — R04.0 EPISTAXIS: ICD-10-CM

## 2023-11-30 DIAGNOSIS — I21.29: ICD-10-CM

## 2023-11-30 DIAGNOSIS — I10 PRIMARY HYPERTENSION: Primary | ICD-10-CM

## 2023-11-30 DIAGNOSIS — I10 PRIMARY HYPERTENSION: ICD-10-CM

## 2023-11-30 DIAGNOSIS — I25.110 CORONARY ARTERY DISEASE INVOLVING NATIVE CORONARY ARTERY OF NATIVE HEART WITH UNSTABLE ANGINA PECTORIS (MULTI): ICD-10-CM

## 2023-11-30 DIAGNOSIS — N18.31 STAGE 3A CHRONIC KIDNEY DISEASE (MULTI): ICD-10-CM

## 2023-11-30 LAB
ANION GAP SERPL CALC-SCNC: 16 MMOL/L (ref 10–20)
BASOPHILS # BLD AUTO: 0.06 X10*3/UL (ref 0–0.1)
BASOPHILS NFR BLD AUTO: 0.7 %
BUN SERPL-MCNC: 16 MG/DL (ref 6–23)
CALCIUM SERPL-MCNC: 8.9 MG/DL (ref 8.6–10.3)
CHLORIDE SERPL-SCNC: 105 MMOL/L (ref 98–107)
CO2 SERPL-SCNC: 24 MMOL/L (ref 21–32)
CREAT SERPL-MCNC: 0.66 MG/DL (ref 0.5–1.05)
EOSINOPHIL # BLD AUTO: 0.39 X10*3/UL (ref 0–0.7)
EOSINOPHIL NFR BLD AUTO: 4.4 %
ERYTHROCYTE [DISTWIDTH] IN BLOOD BY AUTOMATED COUNT: 19.2 % (ref 11.5–14.5)
GFR SERPL CREATININE-BSD FRML MDRD: >90 ML/MIN/1.73M*2
GLUCOSE SERPL-MCNC: 110 MG/DL (ref 74–99)
HCT VFR BLD AUTO: 31.8 % (ref 36–46)
HGB BLD-MCNC: 9.6 G/DL (ref 12–16)
IMM GRANULOCYTES # BLD AUTO: 0.03 X10*3/UL (ref 0–0.7)
IMM GRANULOCYTES NFR BLD AUTO: 0.3 % (ref 0–0.9)
LYMPHOCYTES # BLD AUTO: 1.04 X10*3/UL (ref 1.2–4.8)
LYMPHOCYTES NFR BLD AUTO: 11.7 %
MAGNESIUM SERPL-MCNC: 1.56 MG/DL (ref 1.6–2.4)
MCH RBC QN AUTO: 24.4 PG (ref 26–34)
MCHC RBC AUTO-ENTMCNC: 30.2 G/DL (ref 32–36)
MCV RBC AUTO: 81 FL (ref 80–100)
MONOCYTES # BLD AUTO: 0.48 X10*3/UL (ref 0.1–1)
MONOCYTES NFR BLD AUTO: 5.4 %
NEUTROPHILS # BLD AUTO: 6.92 X10*3/UL (ref 1.2–7.7)
NEUTROPHILS NFR BLD AUTO: 77.5 %
NRBC BLD-RTO: 0 /100 WBCS (ref 0–0)
PLATELET # BLD AUTO: 269 X10*3/UL (ref 150–450)
POTASSIUM SERPL-SCNC: 3.7 MMOL/L (ref 3.5–5.3)
RBC # BLD AUTO: 3.94 X10*6/UL (ref 4–5.2)
SODIUM SERPL-SCNC: 141 MMOL/L (ref 136–145)
WBC # BLD AUTO: 8.9 X10*3/UL (ref 4.4–11.3)

## 2023-11-30 PROCEDURE — 3078F DIAST BP <80 MM HG: CPT | Performed by: INTERNAL MEDICINE

## 2023-11-30 PROCEDURE — 85025 COMPLETE CBC W/AUTO DIFF WBC: CPT

## 2023-11-30 PROCEDURE — 1160F RVW MEDS BY RX/DR IN RCRD: CPT | Performed by: INTERNAL MEDICINE

## 2023-11-30 PROCEDURE — 1126F AMNT PAIN NOTED NONE PRSNT: CPT | Performed by: INTERNAL MEDICINE

## 2023-11-30 PROCEDURE — 3075F SYST BP GE 130 - 139MM HG: CPT | Performed by: INTERNAL MEDICINE

## 2023-11-30 PROCEDURE — 36415 COLL VENOUS BLD VENIPUNCTURE: CPT

## 2023-11-30 PROCEDURE — 93798 PHYS/QHP OP CAR RHAB W/ECG: CPT | Performed by: INTERNAL MEDICINE

## 2023-11-30 PROCEDURE — 1159F MED LIST DOCD IN RCRD: CPT | Performed by: INTERNAL MEDICINE

## 2023-11-30 PROCEDURE — 99213 OFFICE O/P EST LOW 20 MIN: CPT | Performed by: INTERNAL MEDICINE

## 2023-11-30 PROCEDURE — 4004F PT TOBACCO SCREEN RCVD TLK: CPT | Performed by: INTERNAL MEDICINE

## 2023-11-30 PROCEDURE — 3008F BODY MASS INDEX DOCD: CPT | Performed by: INTERNAL MEDICINE

## 2023-11-30 PROCEDURE — 80048 BASIC METABOLIC PNL TOTAL CA: CPT

## 2023-11-30 PROCEDURE — 83735 ASSAY OF MAGNESIUM: CPT

## 2023-11-30 ASSESSMENT — ENCOUNTER SYMPTOMS
BLURRED VISION: 0
CONSTITUTIONAL NEGATIVE: 1
NUMBNESS: 0
ANOREXIA: 0
NAUSEA: 0
HYPERTENSION: 1
EYES NEGATIVE: 1
RESPIRATORY NEGATIVE: 1
HEADACHES: 0
NEUROLOGICAL NEGATIVE: 1
GASTROINTESTINAL NEGATIVE: 1
ABDOMINAL PAIN: 0
MUSCULOSKELETAL NEGATIVE: 1
NECK PAIN: 0
ARTHRALGIAS: 0
WEAKNESS: 0
DIAPHORESIS: 0
CARDIOVASCULAR NEGATIVE: 1

## 2023-11-30 NOTE — PROGRESS NOTES
"Subjective   Patient ID: Audrey Estrada is a 63 y.o. female who presents for Follow-up (6 week fu and request for blood work).    Heart Problem  This is a new problem. The current episode started more than 1 month ago. The problem occurs rarely. The problem has been resolved. Pertinent negatives include no abdominal pain, anorexia, arthralgias, congestion, diaphoresis, headaches, nausea, neck pain, numbness or weakness. The treatment provided moderate relief.   Hypertension  This is a chronic problem. The current episode started more than 1 year ago. The problem has been waxing and waning since onset. The problem is controlled. Pertinent negatives include no blurred vision, headaches or neck pain. Past treatments include beta blockers and angiotensin blockers. The current treatment provides significant improvement. There are no compliance problems.  Hypertensive end-organ damage includes CAD/MI. Identifiable causes of hypertension include chronic renal disease.        Review of Systems   Constitutional: Negative.  Negative for diaphoresis.   HENT:  Positive for nosebleeds. Negative for congestion.    Eyes: Negative.  Negative for blurred vision.   Respiratory: Negative.     Cardiovascular: Negative.    Gastrointestinal: Negative.  Negative for abdominal pain, anorexia and nausea.   Musculoskeletal: Negative.  Negative for arthralgias and neck pain.   Neurological: Negative.  Negative for weakness, numbness and headaches.       Objective   /73 (BP Location: Right arm, Patient Position: Sitting, BP Cuff Size: Adult)   Pulse 66   Temp 35.6 °C (96 °F) (Temporal)   Ht 1.499 m (4' 11\")   Wt 73.9 kg (163 lb)   BMI 32.92 kg/m²     Physical Exam  Vitals reviewed.   Constitutional:       Appearance: Normal appearance. She is obese.   HENT:      Head: Normocephalic.   Eyes:      Conjunctiva/sclera: Conjunctivae normal.   Cardiovascular:      Rate and Rhythm: Normal rate and regular rhythm.      Pulses: Normal pulses. "   Pulmonary:      Effort: Pulmonary effort is normal.      Breath sounds: Normal breath sounds.   Abdominal:      Palpations: Abdomen is soft.   Musculoskeletal:         General: Normal range of motion.      Cervical back: Neck supple.   Skin:     General: Skin is warm and dry.   Neurological:      General: No focal deficit present.         Assessment/Plan   Problem List Items Addressed This Visit             ICD-10-CM    HTN (hypertension) - Primary I10    Coronary artery disease involving native coronary artery of native heart with unstable angina pectoris (CMS/[facility]) I25.110    Stage 3a chronic kidney disease (CMS/[facility]) N18.31     Other Visit Diagnoses         Codes    Hypokalemia     E87.6        I think still she has epistaxis because she has a test of the blood when she swallows the blood is dripping from the back of the nose, she needs ENT evaluation for cauterization of that bleeder which can cause intermittent epistaxis, we will check potassium, we will check magnesium, will check creatinine, we will check hemoglobin.  Now she is little bit because she has a myocardial infarction and it was a very scary situation and also her blood pressure is better 2, she has a fluctuating renal functions, her hemoglobin will be checked again, she remains on Brilinta, she remains on losartan and carvedilol, she is refrain from smoking, all these reports will be relayed to her and if any abnormalities that she will be notified appropriately.  Carotid Doppler will be done because there could be a bruit in carotids.

## 2023-12-04 ENCOUNTER — OFFICE VISIT (OUTPATIENT)
Dept: OTOLARYNGOLOGY | Facility: CLINIC | Age: 63
End: 2023-12-04
Payer: COMMERCIAL

## 2023-12-04 ENCOUNTER — TELEPHONE (OUTPATIENT)
Dept: CARDIOLOGY | Facility: CLINIC | Age: 63
End: 2023-12-04

## 2023-12-04 ENCOUNTER — APPOINTMENT (OUTPATIENT)
Dept: CARDIAC REHAB | Facility: HOSPITAL | Age: 63
End: 2023-12-04
Payer: COMMERCIAL

## 2023-12-04 ENCOUNTER — HOSPITAL ENCOUNTER (OUTPATIENT)
Dept: RADIOLOGY | Facility: HOSPITAL | Age: 63
Discharge: HOME | End: 2023-12-04
Payer: COMMERCIAL

## 2023-12-04 DIAGNOSIS — R04.0 EPISTAXIS: Primary | ICD-10-CM

## 2023-12-04 DIAGNOSIS — R09.89 CAROTID BRUIT, UNSPECIFIED LATERALITY: ICD-10-CM

## 2023-12-04 PROCEDURE — 30901 CONTROL OF NOSEBLEED: CPT | Performed by: OTOLARYNGOLOGY

## 2023-12-04 PROCEDURE — 4004F PT TOBACCO SCREEN RCVD TLK: CPT | Performed by: OTOLARYNGOLOGY

## 2023-12-04 PROCEDURE — 93880 EXTRACRANIAL BILAT STUDY: CPT

## 2023-12-04 PROCEDURE — 99202 OFFICE O/P NEW SF 15 MIN: CPT | Performed by: OTOLARYNGOLOGY

## 2023-12-04 PROCEDURE — 3008F BODY MASS INDEX DOCD: CPT | Performed by: OTOLARYNGOLOGY

## 2023-12-04 PROCEDURE — 93880 EXTRACRANIAL BILAT STUDY: CPT | Performed by: RADIOLOGY

## 2023-12-04 PROCEDURE — 1126F AMNT PAIN NOTED NONE PRSNT: CPT | Performed by: OTOLARYNGOLOGY

## 2023-12-04 PROCEDURE — 1159F MED LIST DOCD IN RCRD: CPT | Performed by: OTOLARYNGOLOGY

## 2023-12-04 PROCEDURE — 1160F RVW MEDS BY RX/DR IN RCRD: CPT | Performed by: OTOLARYNGOLOGY

## 2023-12-04 ASSESSMENT — ENCOUNTER SYMPTOMS
CARDIOVASCULAR NEGATIVE: 1
RESPIRATORY NEGATIVE: 1
NEUROLOGICAL NEGATIVE: 1
CONSTITUTIONAL NEGATIVE: 1

## 2023-12-04 NOTE — PROGRESS NOTES
Subjective   Patient ID: Audrey Estrada is a 63 y.o. female who presents for Epistaxis (Nose Bleed).  She is seen at the request of Dr. Araya.    HPI  Patient anticoagulation with Brilinta.  Significant recurrent bleeding right side only.  Remaining ENT inquiry clear.      Review of Systems   Constitutional: Negative.    HENT: Negative.     Respiratory: Negative.     Cardiovascular: Negative.    Neurological: Negative.            Physical Exam    General appearance: No acute distress. Normal facies. Symmetric facial movement. No gross lesions of the face are noted.  The external ear structures appear normal. The ear canals patent and the tympanic membranes are intact without evidence of air-fluid levels, retraction, or congenital defects.  Anterior rhinoscopy notes essentially a midline nasal septum. Examination is noted for normal healthy mucosal membranes without any evidence of lesions, polyps, or exudate. The tongue is normally mobile. There are no lesions on the gingiva, buccal, or oral mucosa. There are no oral cavity masses.  The neck is negative for mass lymphadenopathy. The trachea and parotid are clear. The thyroid bed is grossly unremarkable. The salivary gland structures are grossly unremarkable.    Procedure:  After topical anesthesia patient had clear notification of arterial bleeding source right anterior septum midpoint.  Cauterization with silver nitrate followed by antibiotic ointment applied.  Patient tolerated well.    Assessment/Plan   Epistaxis right side now identified and cauterized accordingly.  Favor observation.  Recommend routine strategies to reduce further bleeding.  See me should that occur.  All questions were answered in this regard accordingly.

## 2023-12-04 NOTE — TELEPHONE ENCOUNTER
Cardiac Rehab orders received, placed for signature by Dr. Dick MD [facility] and faxed to cardiac rehab with confirmation received.

## 2023-12-06 DIAGNOSIS — I25.110 CORONARY ARTERY DISEASE INVOLVING NATIVE CORONARY ARTERY OF NATIVE HEART WITH UNSTABLE ANGINA PECTORIS (MULTI): Primary | ICD-10-CM

## 2023-12-07 ENCOUNTER — APPOINTMENT (OUTPATIENT)
Dept: CARDIAC REHAB | Facility: HOSPITAL | Age: 63
End: 2023-12-07
Payer: COMMERCIAL

## 2023-12-11 ENCOUNTER — APPOINTMENT (OUTPATIENT)
Dept: CARDIAC REHAB | Facility: HOSPITAL | Age: 63
End: 2023-12-11
Payer: COMMERCIAL

## 2023-12-13 LAB — HOLD SPECIMEN: NORMAL

## 2023-12-14 ENCOUNTER — APPOINTMENT (OUTPATIENT)
Dept: CARDIAC REHAB | Facility: HOSPITAL | Age: 63
End: 2023-12-14
Payer: COMMERCIAL

## 2023-12-18 ENCOUNTER — APPOINTMENT (OUTPATIENT)
Dept: CARDIAC REHAB | Facility: HOSPITAL | Age: 63
End: 2023-12-18
Payer: COMMERCIAL

## 2023-12-21 ENCOUNTER — APPOINTMENT (OUTPATIENT)
Dept: CARDIAC REHAB | Facility: HOSPITAL | Age: 63
End: 2023-12-21
Payer: COMMERCIAL

## 2023-12-28 ENCOUNTER — APPOINTMENT (OUTPATIENT)
Dept: CARDIAC REHAB | Facility: HOSPITAL | Age: 63
End: 2023-12-28
Payer: COMMERCIAL

## 2024-01-04 ENCOUNTER — APPOINTMENT (OUTPATIENT)
Dept: CARDIAC REHAB | Facility: HOSPITAL | Age: 64
End: 2024-01-04
Payer: COMMERCIAL

## 2024-01-04 ENCOUNTER — PATIENT OUTREACH (OUTPATIENT)
Dept: CARE COORDINATION | Facility: CLINIC | Age: 64
End: 2024-01-04
Payer: COMMERCIAL

## 2024-01-08 ENCOUNTER — APPOINTMENT (OUTPATIENT)
Dept: CARDIAC REHAB | Facility: HOSPITAL | Age: 64
End: 2024-01-08
Payer: COMMERCIAL

## 2024-01-09 ENCOUNTER — OFFICE VISIT (OUTPATIENT)
Dept: PRIMARY CARE | Facility: CLINIC | Age: 64
End: 2024-01-09
Payer: COMMERCIAL

## 2024-01-09 DIAGNOSIS — R05.9 COUGH, UNSPECIFIED TYPE: ICD-10-CM

## 2024-01-09 DIAGNOSIS — J06.9 ACUTE UPPER RESPIRATORY INFECTION, UNSPECIFIED: ICD-10-CM

## 2024-01-09 PROCEDURE — 87637 SARSCOV2&INF A&B&RSV AMP PRB: CPT

## 2024-01-09 PROCEDURE — 1126F AMNT PAIN NOTED NONE PRSNT: CPT | Performed by: INTERNAL MEDICINE

## 2024-01-09 PROCEDURE — 99211 OFF/OP EST MAY X REQ PHY/QHP: CPT | Performed by: INTERNAL MEDICINE

## 2024-01-09 PROCEDURE — 1036F TOBACCO NON-USER: CPT | Performed by: INTERNAL MEDICINE

## 2024-01-09 PROCEDURE — 3008F BODY MASS INDEX DOCD: CPT | Performed by: INTERNAL MEDICINE

## 2024-01-09 NOTE — PROGRESS NOTES
COVID/FLU/RSV SWAB    CHILLS,DIARRHEA,DIZZY, SOB, FEVER AND NAUSEA AND VOMITING ON AND OFF SINCE SAT

## 2024-01-11 ENCOUNTER — APPOINTMENT (OUTPATIENT)
Dept: CARDIAC REHAB | Facility: HOSPITAL | Age: 64
End: 2024-01-11
Payer: COMMERCIAL

## 2024-01-11 LAB
FLUAV RNA RESP QL NAA+PROBE: NOT DETECTED
FLUBV RNA RESP QL NAA+PROBE: NOT DETECTED
RSV RNA RESP QL NAA+PROBE: NOT DETECTED
SARS-COV-2 RNA RESP QL NAA+PROBE: DETECTED

## 2024-01-15 ENCOUNTER — APPOINTMENT (OUTPATIENT)
Dept: CARDIOLOGY | Facility: CLINIC | Age: 64
End: 2024-01-15
Payer: COMMERCIAL

## 2024-01-22 ENCOUNTER — OFFICE VISIT (OUTPATIENT)
Dept: CARDIOLOGY | Facility: CLINIC | Age: 64
End: 2024-01-22
Payer: COMMERCIAL

## 2024-01-22 VITALS
HEART RATE: 60 BPM | DIASTOLIC BLOOD PRESSURE: 74 MMHG | WEIGHT: 160.4 LBS | SYSTOLIC BLOOD PRESSURE: 134 MMHG | HEIGHT: 59 IN | BODY MASS INDEX: 32.34 KG/M2

## 2024-01-22 DIAGNOSIS — E78.2 MIXED HYPERLIPIDEMIA: ICD-10-CM

## 2024-01-22 DIAGNOSIS — Z87.891 FORMER CIGARETTE SMOKER: ICD-10-CM

## 2024-01-22 DIAGNOSIS — D64.9 ANEMIA, UNSPECIFIED TYPE: ICD-10-CM

## 2024-01-22 DIAGNOSIS — E78.2 MODERATE MIXED HYPERLIPIDEMIA NOT REQUIRING STATIN THERAPY: ICD-10-CM

## 2024-01-22 DIAGNOSIS — F41.9 ANXIETY: ICD-10-CM

## 2024-01-22 DIAGNOSIS — I25.110 CORONARY ARTERY DISEASE INVOLVING NATIVE CORONARY ARTERY OF NATIVE HEART WITH UNSTABLE ANGINA PECTORIS (MULTI): ICD-10-CM

## 2024-01-22 DIAGNOSIS — K76.0 NAFLD (NONALCOHOLIC FATTY LIVER DISEASE): ICD-10-CM

## 2024-01-22 DIAGNOSIS — I65.23 BILATERAL CAROTID ARTERY STENOSIS: ICD-10-CM

## 2024-01-22 DIAGNOSIS — Z98.61 CAD S/P PERCUTANEOUS CORONARY ANGIOPLASTY: ICD-10-CM

## 2024-01-22 DIAGNOSIS — I25.10 CAD S/P PERCUTANEOUS CORONARY ANGIOPLASTY: ICD-10-CM

## 2024-01-22 DIAGNOSIS — I21.19 STEMI INVOLVING OTH CORONARY ARTERY OF INFERIOR WALL (MULTI): ICD-10-CM

## 2024-01-22 DIAGNOSIS — I21.3 ST ELEVATION MYOCARDIAL INFARCTION (STEMI), UNSPECIFIED ARTERY (MULTI): ICD-10-CM

## 2024-01-22 DIAGNOSIS — I10 PRIMARY HYPERTENSION: ICD-10-CM

## 2024-01-22 DIAGNOSIS — K21.9 GASTROESOPHAGEAL REFLUX DISEASE, UNSPECIFIED WHETHER ESOPHAGITIS PRESENT: ICD-10-CM

## 2024-01-22 PROCEDURE — 1160F RVW MEDS BY RX/DR IN RCRD: CPT | Performed by: STUDENT IN AN ORGANIZED HEALTH CARE EDUCATION/TRAINING PROGRAM

## 2024-01-22 PROCEDURE — 1036F TOBACCO NON-USER: CPT | Performed by: STUDENT IN AN ORGANIZED HEALTH CARE EDUCATION/TRAINING PROGRAM

## 2024-01-22 PROCEDURE — 1159F MED LIST DOCD IN RCRD: CPT | Performed by: STUDENT IN AN ORGANIZED HEALTH CARE EDUCATION/TRAINING PROGRAM

## 2024-01-22 PROCEDURE — 1126F AMNT PAIN NOTED NONE PRSNT: CPT | Performed by: STUDENT IN AN ORGANIZED HEALTH CARE EDUCATION/TRAINING PROGRAM

## 2024-01-22 PROCEDURE — 3075F SYST BP GE 130 - 139MM HG: CPT | Performed by: STUDENT IN AN ORGANIZED HEALTH CARE EDUCATION/TRAINING PROGRAM

## 2024-01-22 PROCEDURE — 3008F BODY MASS INDEX DOCD: CPT | Performed by: STUDENT IN AN ORGANIZED HEALTH CARE EDUCATION/TRAINING PROGRAM

## 2024-01-22 PROCEDURE — 3078F DIAST BP <80 MM HG: CPT | Performed by: STUDENT IN AN ORGANIZED HEALTH CARE EDUCATION/TRAINING PROGRAM

## 2024-01-22 PROCEDURE — 99214 OFFICE O/P EST MOD 30 MIN: CPT | Performed by: STUDENT IN AN ORGANIZED HEALTH CARE EDUCATION/TRAINING PROGRAM

## 2024-01-22 RX ORDER — CARVEDILOL 25 MG/1
25 TABLET ORAL 2 TIMES DAILY
Qty: 180 TABLET | Refills: 3 | Status: SHIPPED | OUTPATIENT
Start: 2024-01-22 | End: 2025-01-21

## 2024-01-22 RX ORDER — LOSARTAN POTASSIUM 100 MG/1
100 TABLET ORAL DAILY
Qty: 90 TABLET | Refills: 3 | Status: SHIPPED | OUTPATIENT
Start: 2024-01-22 | End: 2025-01-21

## 2024-01-22 RX ORDER — ATORVASTATIN CALCIUM 80 MG/1
80 TABLET, FILM COATED ORAL DAILY
Qty: 90 TABLET | Refills: 3 | Status: SHIPPED | OUTPATIENT
Start: 2024-01-22 | End: 2025-01-21

## 2024-01-22 NOTE — PATIENT INSTRUCTIONS
Patient to follow up in 6 months with Dr. Nish Mnedes MD [facility]     Please continue Brilinta and Aspirin for now- call with any changes in your breathing.     Orders for lab work placed- office will call with results.     Call to reschedule with cardiac rehab -     Office will arrange Carotid Ultrasound again in December of this year- this is just screening to followup on current ultrasound you did in December.     No other changes today.   Continue same medications and treatments.   Patient educated on proper medication use.   Patient educated on risk factor modification.   Please bring any lab results from other providers / physicians to your next appointment.     Please bring all medicines, vitamins, and herbal supplements with you when you come to the office.     Prescriptions will not be filled unless you are compliant with your follow up appointments or have a follow up appointment scheduled as per instruction of your physician. Refills should be requested at the time of your visit.    Marc ARITA RN am scribing for and in the presence of Dr. Nish Mendes MD [facility]

## 2024-01-22 NOTE — PROGRESS NOTES
Chief complaint:   Chief Complaint   Patient presents with    Follow-up     3 month        History of Present Illness  Audrey Estrada is a 63 y.o. year old female patient with history of smoking, hyperlipidemia, and hypertension, CAD with STEMI status post PCI to circumflex and RCA in 10/2023 who is presenting for cardiovascular follow-up     Today, patient reports is doing well. Denies chest pain, syncope or near syncope. Is tolerating Brilinta- does note some shortness of breath at times while moving around the house. Had remote episode of epistaxis, seen in ER (2023).  Tolerating medications today. Continues with cardiac rehab. Quit smoking after I-STEMI event in October.      Social History     Tobacco Use    Smoking status: Former     Packs/day: 1.00     Years: 15.00     Additional pack years: 0.00     Total pack years: 15.00     Types: Cigarettes     Quit date: 2023     Years since quittin.3    Smokeless tobacco: Never   Vaping Use    Vaping Use: Never used   Substance Use Topics    Alcohol use: Never    Drug use: Never       Outpatient Medications:  Current Outpatient Medications   Medication Instructions    aspirin 81 mg, oral, Daily    atorvastatin (LIPITOR) 80 mg, oral, Daily    carvedilol (COREG) 25 mg, oral, 2 times daily    cholecalciferol (VITAMIN D-3) 25 mcg, oral, Daily    FLUoxetine (PROZAC) 20 mg, oral, Daily    losartan (COZAAR) 100 mg, oral, Daily    magnesium oxide (MAGOX) 400 mg, oral, Daily    omega-3 acid ethyl esters (Lovaza) 1 gram capsule TAKE 1 CAPSULE BY MOUTH TWICE A DAY    omeprazole OTC (PRILOSEC OTC) 1 mg, oral, Daily before breakfast, Do not crush, chew, or split.    potassium chloride CR (Klor-Con) 10 mEq ER tablet 10 mEq, oral, Daily, Do not crush, chew, or split.    ticagrelor (BRILINTA) 90 mg, oral, 2 times daily         Vitals:  Vitals:    24 0830   BP: 134/74   Pulse: 60       Physical Exam:  General: NAD, well-appearing  HEENT: moist mucous  membranes, no jaundice  Neck: No JVD, no carotid bruit  Lungs: CTA luisa, no wheezing or rales  Cardiac: RRR, no murmurs  Abdomen: soft, non-tender, non-distended  Extremities: 2+ radial pulses, no edema, palpable pedal pulses  Skin: warm, dry, no wound  Neurologic: AAOx3,  no focal deficits       Reviewed Study(s):    Carotid Ultrasound 12/4/23:  Utilizing the peak systolic velocities, the estimated degree of  stenosis within the internal carotid arteries is 50-69% on the left  and less than 50% on the right.      Lipid 10/1/23:    HDL 28.7  LDL 92  Tri 189    Left Heart Cath:  10/1/2023- IVUS CARLOS ALBERTO to mid circumflex and Ostium of obtuse marginal  10/2/2023- IVUS CARLOS ALBERTO to mid to proximal RCA  10/3/2023- IVUS Shockwave IVL to the under expanded ostial RCA Stent; CARLOS ALBERTO to severe stenosis of mid to distal RPL Branch.   10/6/2023- Recurrent complaints of Chest Pain in post op stage, patent coronary stents with RUBI 3 flow in all vessels and no residual significant stenosis.      Echo: 10/1/2023:   1. Left ventricular systolic function is normal with a 60% estimated ejection fraction.   2. Spectral Doppler shows an impaired relaxation pattern of left ventricular diastolic filling.   3. There is evidence of mild mitral valve stenosis.   4. There is moderate mitral annular calcification.   5. Mild tricuspid regurgitation is visualized.   6. Mild aortic valve stenosis.   7. The main pulmonary artery is normal in size, and position, with normal bifurcation into the left and right pulmonary arteries.     Lexiscan Nuclear: 7/19/2021:  Normal Lexiscan myocardial perfusion study.  No evidence of ischemia or myocardial infarction by perfusion imaging.  Normal left ventricular systolic function, ejection fraction is 68%.  There are no prior studies for comparison.  None invasive risk stratification is low risk       Assessment/Plan   Diagnoses and all orders for this visit:  CAD S/P percutaneous coronary angioplasty  Coronary  artery disease involving native coronary artery of native heart with unstable angina pectoris (CMS/HCC)  Primary hypertension  Mixed hyperlipidemia  STEMI involving oth coronary artery of inferior wall (CMS/HCC)  NAFLD (nonalcoholic fatty liver disease)  Gastroesophageal reflux disease, unspecified whether esophagitis present  Anemia, unspecified type  Anxiety  Former cigarette smoker  BMI 32.0-32.9,adult  ST elevation myocardial infarction (STEMI), unspecified artery (CMS/HCC)  Moderate mixed hyperlipidemia not requiring statin therapy  Bilateral carotid artery stenosis      # Coronary Artery Disease, s/p PCI  - CAD with inferior STEMI status post PCI to circumflex and RCA in 10/2023   - Continue DAPT with aspirin and Brilinta for minimum 1 year  - Patient is asymptomatic  - Continue cardiac rehab   - Discussed post event depression, working with therapy and PCP on this     # Hyperlipidemia   - LDL above goal for patient (history CAD with CARLOS ALBERTO)   - Repeat lipid panel, call results  - Tolerating high dose oral statin, Lipitor 80mg    # Carotid Disease, bilaterally  - Discussed results today with patient  - Will repeat imaging in 1 year for follow up     RTC 6 months     Nish Mendes MD [facility]  Interventional Cardiology  Endovascular Interventions  [email or web address]    **Disclaimer: This note was dictated by speech recognition, and every effort has been made to prevent any error in transcription, however minor errors may be present**

## 2024-01-24 ENCOUNTER — LAB (OUTPATIENT)
Dept: LAB | Facility: LAB | Age: 64
End: 2024-01-24
Payer: COMMERCIAL

## 2024-01-24 DIAGNOSIS — I25.110 CORONARY ARTERY DISEASE INVOLVING NATIVE CORONARY ARTERY OF NATIVE HEART WITH UNSTABLE ANGINA PECTORIS (MULTI): ICD-10-CM

## 2024-01-24 DIAGNOSIS — Z98.61 CAD S/P PERCUTANEOUS CORONARY ANGIOPLASTY: ICD-10-CM

## 2024-01-24 DIAGNOSIS — I25.10 CAD S/P PERCUTANEOUS CORONARY ANGIOPLASTY: ICD-10-CM

## 2024-01-24 DIAGNOSIS — E78.2 MIXED HYPERLIPIDEMIA: ICD-10-CM

## 2024-01-24 LAB
ALBUMIN SERPL BCP-MCNC: 3.8 G/DL (ref 3.4–5)
ALP SERPL-CCNC: 51 U/L (ref 33–136)
ALT SERPL W P-5'-P-CCNC: 10 U/L (ref 7–45)
ANION GAP SERPL CALC-SCNC: 15 MMOL/L (ref 10–20)
AST SERPL W P-5'-P-CCNC: 15 U/L (ref 9–39)
BILIRUB SERPL-MCNC: 0.6 MG/DL (ref 0–1.2)
BUN SERPL-MCNC: 18 MG/DL (ref 6–23)
CALCIUM SERPL-MCNC: 8 MG/DL (ref 8.6–10.3)
CHLORIDE SERPL-SCNC: 103 MMOL/L (ref 98–107)
CHOLEST SERPL-MCNC: 110 MG/DL (ref 0–199)
CHOLESTEROL/HDL RATIO: 3.2
CO2 SERPL-SCNC: 29 MMOL/L (ref 21–32)
CREAT SERPL-MCNC: 1.08 MG/DL (ref 0.5–1.05)
EGFRCR SERPLBLD CKD-EPI 2021: 57 ML/MIN/1.73M*2
GLUCOSE SERPL-MCNC: 110 MG/DL (ref 74–99)
HDLC SERPL-MCNC: 34 MG/DL
LDLC SERPL CALC-MCNC: 29 MG/DL
NON HDL CHOLESTEROL: 76 MG/DL (ref 0–149)
POTASSIUM SERPL-SCNC: 3.4 MMOL/L (ref 3.5–5.3)
PROT SERPL-MCNC: 7.5 G/DL (ref 6.4–8.2)
SODIUM SERPL-SCNC: 144 MMOL/L (ref 136–145)
TRIGL SERPL-MCNC: 233 MG/DL (ref 0–149)
VLDL: 47 MG/DL (ref 0–40)

## 2024-01-24 PROCEDURE — 36415 COLL VENOUS BLD VENIPUNCTURE: CPT

## 2024-01-24 PROCEDURE — 80053 COMPREHEN METABOLIC PANEL: CPT

## 2024-01-24 PROCEDURE — 80061 LIPID PANEL: CPT

## 2024-01-25 ENCOUNTER — TELEPHONE (OUTPATIENT)
Dept: CARDIOLOGY | Facility: CLINIC | Age: 64
End: 2024-01-25
Payer: COMMERCIAL

## 2024-01-25 NOTE — TELEPHONE ENCOUNTER
Pt left message asking for results of lab work done yesterday 1/24/2024.      Routed to Marc PAZ RN

## 2024-01-29 NOTE — TELEPHONE ENCOUNTER
Will forward to Dr. Nish Mendes MD [facility] to review lipid and BMP.     Of note- new renal insufficiency

## 2024-01-30 NOTE — TELEPHONE ENCOUNTER
Patient advised with verbal understanding. She will follow up with Dr. Marshal MD office in regards to kidney function.

## 2024-01-30 NOTE — TELEPHONE ENCOUNTER
Nish Parnell MD  You18 hours ago (2:57 PM)     Cholesterol has improved significantly, she should continue meds  Her kidney function is mildy reduced, she should follow up with PCP regarding this

## 2024-02-07 ENCOUNTER — APPOINTMENT (OUTPATIENT)
Dept: RADIOLOGY | Facility: HOSPITAL | Age: 64
DRG: 684 | End: 2024-02-07
Payer: COMMERCIAL

## 2024-02-07 ENCOUNTER — HOSPITAL ENCOUNTER (OUTPATIENT)
Facility: HOSPITAL | Age: 64
Setting detail: OBSERVATION
Discharge: HOME | DRG: 684 | End: 2024-02-08
Attending: EMERGENCY MEDICINE | Admitting: STUDENT IN AN ORGANIZED HEALTH CARE EDUCATION/TRAINING PROGRAM
Payer: COMMERCIAL

## 2024-02-07 ENCOUNTER — APPOINTMENT (OUTPATIENT)
Dept: CARDIOLOGY | Facility: HOSPITAL | Age: 64
DRG: 684 | End: 2024-02-07
Payer: COMMERCIAL

## 2024-02-07 DIAGNOSIS — E83.42 HYPOMAGNESEMIA: ICD-10-CM

## 2024-02-07 DIAGNOSIS — R07.9 CHEST PAIN, UNSPECIFIED TYPE: Primary | ICD-10-CM

## 2024-02-07 DIAGNOSIS — R41.82 ALTERED MENTAL STATUS, UNSPECIFIED ALTERED MENTAL STATUS TYPE: ICD-10-CM

## 2024-02-07 LAB
ALBUMIN SERPL BCP-MCNC: 4.1 G/DL (ref 3.4–5)
ALP SERPL-CCNC: 52 U/L (ref 33–136)
ALT SERPL W P-5'-P-CCNC: 17 U/L (ref 7–45)
ANION GAP SERPL CALC-SCNC: 14 MMOL/L (ref 10–20)
APTT PPP: 30 SECONDS (ref 27–38)
AST SERPL W P-5'-P-CCNC: 23 U/L (ref 9–39)
BASOPHILS # BLD AUTO: 0.07 X10*3/UL (ref 0–0.1)
BASOPHILS NFR BLD AUTO: 0.6 %
BILIRUB SERPL-MCNC: 0.6 MG/DL (ref 0–1.2)
BUN SERPL-MCNC: 25 MG/DL (ref 6–23)
CALCIUM SERPL-MCNC: 9.5 MG/DL (ref 8.6–10.3)
CARDIAC TROPONIN I PNL SERPL HS: 3 NG/L (ref 0–13)
CARDIAC TROPONIN I PNL SERPL HS: <3 NG/L (ref 0–13)
CHLORIDE SERPL-SCNC: 102 MMOL/L (ref 98–107)
CO2 SERPL-SCNC: 26 MMOL/L (ref 21–32)
CREAT SERPL-MCNC: 1.31 MG/DL (ref 0.5–1.05)
D DIMER PPP FEU-MCNC: 540 NG/ML FEU
EGFRCR SERPLBLD CKD-EPI 2021: 45 ML/MIN/1.73M*2
EOSINOPHIL # BLD AUTO: 0.38 X10*3/UL (ref 0–0.7)
EOSINOPHIL NFR BLD AUTO: 3.2 %
ERYTHROCYTE [DISTWIDTH] IN BLOOD BY AUTOMATED COUNT: 15.6 % (ref 11.5–14.5)
FLUAV RNA RESP QL NAA+PROBE: NOT DETECTED
FLUBV RNA RESP QL NAA+PROBE: NOT DETECTED
GLUCOSE SERPL-MCNC: 112 MG/DL (ref 74–99)
HCT VFR BLD AUTO: 33.7 % (ref 36–46)
HGB BLD-MCNC: 10.4 G/DL (ref 12–16)
IMM GRANULOCYTES # BLD AUTO: 0.06 X10*3/UL (ref 0–0.7)
IMM GRANULOCYTES NFR BLD AUTO: 0.5 % (ref 0–0.9)
INR PPP: 1.1 (ref 0.9–1.1)
LIPASE SERPL-CCNC: 39 U/L (ref 9–82)
LYMPHOCYTES # BLD AUTO: 1.01 X10*3/UL (ref 1.2–4.8)
LYMPHOCYTES NFR BLD AUTO: 8.6 %
MAGNESIUM SERPL-MCNC: 1.49 MG/DL (ref 1.6–2.4)
MCH RBC QN AUTO: 25 PG (ref 26–34)
MCHC RBC AUTO-ENTMCNC: 30.9 G/DL (ref 32–36)
MCV RBC AUTO: 81 FL (ref 80–100)
MONOCYTES # BLD AUTO: 0.69 X10*3/UL (ref 0.1–1)
MONOCYTES NFR BLD AUTO: 5.9 %
NEUTROPHILS # BLD AUTO: 9.49 X10*3/UL (ref 1.2–7.7)
NEUTROPHILS NFR BLD AUTO: 81.2 %
NRBC BLD-RTO: 0 /100 WBCS (ref 0–0)
PLATELET # BLD AUTO: 290 X10*3/UL (ref 150–450)
POTASSIUM SERPL-SCNC: 4.2 MMOL/L (ref 3.5–5.3)
PROT SERPL-MCNC: 7.9 G/DL (ref 6.4–8.2)
PROTHROMBIN TIME: 12.7 SECONDS (ref 9.8–12.8)
RBC # BLD AUTO: 4.16 X10*6/UL (ref 4–5.2)
SARS-COV-2 RNA RESP QL NAA+PROBE: NOT DETECTED
SODIUM SERPL-SCNC: 138 MMOL/L (ref 136–145)
WBC # BLD AUTO: 11.7 X10*3/UL (ref 4.4–11.3)

## 2024-02-07 PROCEDURE — 85379 FIBRIN DEGRADATION QUANT: CPT | Performed by: EMERGENCY MEDICINE

## 2024-02-07 PROCEDURE — 96365 THER/PROPH/DIAG IV INF INIT: CPT

## 2024-02-07 PROCEDURE — 36415 COLL VENOUS BLD VENIPUNCTURE: CPT | Performed by: EMERGENCY MEDICINE

## 2024-02-07 PROCEDURE — 71275 CT ANGIOGRAPHY CHEST: CPT

## 2024-02-07 PROCEDURE — G0378 HOSPITAL OBSERVATION PER HR: HCPCS

## 2024-02-07 PROCEDURE — 85025 COMPLETE CBC W/AUTO DIFF WBC: CPT | Performed by: EMERGENCY MEDICINE

## 2024-02-07 PROCEDURE — 84484 ASSAY OF TROPONIN QUANT: CPT | Performed by: EMERGENCY MEDICINE

## 2024-02-07 PROCEDURE — 84075 ASSAY ALKALINE PHOSPHATASE: CPT | Performed by: EMERGENCY MEDICINE

## 2024-02-07 PROCEDURE — 83690 ASSAY OF LIPASE: CPT | Performed by: EMERGENCY MEDICINE

## 2024-02-07 PROCEDURE — 70450 CT HEAD/BRAIN W/O DYE: CPT

## 2024-02-07 PROCEDURE — 71275 CT ANGIOGRAPHY CHEST: CPT | Performed by: RADIOLOGY

## 2024-02-07 PROCEDURE — 71045 X-RAY EXAM CHEST 1 VIEW: CPT

## 2024-02-07 PROCEDURE — 93005 ELECTROCARDIOGRAM TRACING: CPT

## 2024-02-07 PROCEDURE — 71045 X-RAY EXAM CHEST 1 VIEW: CPT | Performed by: RADIOLOGY

## 2024-02-07 PROCEDURE — 2500000001 HC RX 250 WO HCPCS SELF ADMINISTERED DRUGS (ALT 637 FOR MEDICARE OP): Performed by: EMERGENCY MEDICINE

## 2024-02-07 PROCEDURE — 2550000001 HC RX 255 CONTRASTS: Performed by: EMERGENCY MEDICINE

## 2024-02-07 PROCEDURE — 99285 EMERGENCY DEPT VISIT HI MDM: CPT | Mod: 25 | Performed by: EMERGENCY MEDICINE

## 2024-02-07 PROCEDURE — 2500000004 HC RX 250 GENERAL PHARMACY W/ HCPCS (ALT 636 FOR OP/ED): Performed by: EMERGENCY MEDICINE

## 2024-02-07 PROCEDURE — 70450 CT HEAD/BRAIN W/O DYE: CPT | Performed by: STUDENT IN AN ORGANIZED HEALTH CARE EDUCATION/TRAINING PROGRAM

## 2024-02-07 PROCEDURE — 99223 1ST HOSP IP/OBS HIGH 75: CPT | Performed by: INTERNAL MEDICINE

## 2024-02-07 PROCEDURE — 85730 THROMBOPLASTIN TIME PARTIAL: CPT | Performed by: EMERGENCY MEDICINE

## 2024-02-07 PROCEDURE — 85610 PROTHROMBIN TIME: CPT | Performed by: EMERGENCY MEDICINE

## 2024-02-07 PROCEDURE — 87636 SARSCOV2 & INF A&B AMP PRB: CPT | Performed by: EMERGENCY MEDICINE

## 2024-02-07 PROCEDURE — 83735 ASSAY OF MAGNESIUM: CPT | Performed by: EMERGENCY MEDICINE

## 2024-02-07 RX ORDER — ACETAMINOPHEN 650 MG/1
650 SUPPOSITORY RECTAL EVERY 4 HOURS PRN
Status: DISCONTINUED | OUTPATIENT
Start: 2024-02-07 | End: 2024-02-09 | Stop reason: HOSPADM

## 2024-02-07 RX ORDER — POLYETHYLENE GLYCOL 3350 17 G/17G
17 POWDER, FOR SOLUTION ORAL DAILY PRN
Status: DISCONTINUED | OUTPATIENT
Start: 2024-02-07 | End: 2024-02-09 | Stop reason: HOSPADM

## 2024-02-07 RX ORDER — ONDANSETRON HYDROCHLORIDE 2 MG/ML
4 INJECTION, SOLUTION INTRAVENOUS ONCE
Status: DISCONTINUED | OUTPATIENT
Start: 2024-02-07 | End: 2024-02-09 | Stop reason: HOSPADM

## 2024-02-07 RX ORDER — MAGNESIUM SULFATE HEPTAHYDRATE 40 MG/ML
2 INJECTION, SOLUTION INTRAVENOUS ONCE
Status: COMPLETED | OUTPATIENT
Start: 2024-02-07 | End: 2024-02-07

## 2024-02-07 RX ORDER — ONDANSETRON 4 MG/1
4 TABLET, FILM COATED ORAL EVERY 8 HOURS PRN
Status: DISCONTINUED | OUTPATIENT
Start: 2024-02-07 | End: 2024-02-09 | Stop reason: HOSPADM

## 2024-02-07 RX ORDER — NAPROXEN SODIUM 220 MG/1
324 TABLET, FILM COATED ORAL ONCE
Status: COMPLETED | OUTPATIENT
Start: 2024-02-07 | End: 2024-02-07

## 2024-02-07 RX ORDER — ACETAMINOPHEN 160 MG/5ML
650 SOLUTION ORAL EVERY 4 HOURS PRN
Status: DISCONTINUED | OUTPATIENT
Start: 2024-02-07 | End: 2024-02-09 | Stop reason: HOSPADM

## 2024-02-07 RX ORDER — PANTOPRAZOLE SODIUM 40 MG/1
40 TABLET, DELAYED RELEASE ORAL DAILY
Status: DISCONTINUED | OUTPATIENT
Start: 2024-02-08 | End: 2024-02-09 | Stop reason: HOSPADM

## 2024-02-07 RX ORDER — ENOXAPARIN SODIUM 100 MG/ML
40 INJECTION SUBCUTANEOUS EVERY 24 HOURS
Status: DISCONTINUED | OUTPATIENT
Start: 2024-02-07 | End: 2024-02-09 | Stop reason: HOSPADM

## 2024-02-07 RX ORDER — ONDANSETRON HYDROCHLORIDE 2 MG/ML
4 INJECTION, SOLUTION INTRAVENOUS EVERY 8 HOURS PRN
Status: DISCONTINUED | OUTPATIENT
Start: 2024-02-07 | End: 2024-02-09 | Stop reason: HOSPADM

## 2024-02-07 RX ORDER — PANTOPRAZOLE SODIUM 40 MG/10ML
40 INJECTION, POWDER, LYOPHILIZED, FOR SOLUTION INTRAVENOUS DAILY
Status: DISCONTINUED | OUTPATIENT
Start: 2024-02-08 | End: 2024-02-09 | Stop reason: HOSPADM

## 2024-02-07 RX ORDER — SODIUM CHLORIDE, SODIUM LACTATE, POTASSIUM CHLORIDE, CALCIUM CHLORIDE 600; 310; 30; 20 MG/100ML; MG/100ML; MG/100ML; MG/100ML
50 INJECTION, SOLUTION INTRAVENOUS CONTINUOUS
Status: DISCONTINUED | OUTPATIENT
Start: 2024-02-07 | End: 2024-02-09 | Stop reason: HOSPADM

## 2024-02-07 RX ORDER — ACETAMINOPHEN 325 MG/1
650 TABLET ORAL EVERY 4 HOURS PRN
Status: DISCONTINUED | OUTPATIENT
Start: 2024-02-07 | End: 2024-02-09 | Stop reason: HOSPADM

## 2024-02-07 RX ORDER — TALC
6 POWDER (GRAM) TOPICAL NIGHTLY PRN
Status: DISCONTINUED | OUTPATIENT
Start: 2024-02-07 | End: 2024-02-09 | Stop reason: HOSPADM

## 2024-02-07 RX ADMIN — ASPIRIN 324 MG: 81 TABLET, CHEWABLE ORAL at 21:47

## 2024-02-07 RX ADMIN — SODIUM CHLORIDE 500 ML: 9 INJECTION, SOLUTION INTRAVENOUS at 20:39

## 2024-02-07 RX ADMIN — MAGNESIUM SULFATE HEPTAHYDRATE 2 G: 40 INJECTION, SOLUTION INTRAVENOUS at 21:38

## 2024-02-07 RX ADMIN — IOHEXOL 75 ML: 350 INJECTION, SOLUTION INTRAVENOUS at 22:13

## 2024-02-07 ASSESSMENT — PAIN - FUNCTIONAL ASSESSMENT: PAIN_FUNCTIONAL_ASSESSMENT: 0-10

## 2024-02-07 ASSESSMENT — PAIN SCALES - GENERAL
PAINLEVEL_OUTOF10: 0 - NO PAIN

## 2024-02-07 ASSESSMENT — COLUMBIA-SUICIDE SEVERITY RATING SCALE - C-SSRS
2. HAVE YOU ACTUALLY HAD ANY THOUGHTS OF KILLING YOURSELF?: NO
1. IN THE PAST MONTH, HAVE YOU WISHED YOU WERE DEAD OR WISHED YOU COULD GO TO SLEEP AND NOT WAKE UP?: NO
6. HAVE YOU EVER DONE ANYTHING, STARTED TO DO ANYTHING, OR PREPARED TO DO ANYTHING TO END YOUR LIFE?: NO

## 2024-02-07 ASSESSMENT — HEART SCORE
AGE: 65+
ECG: NORMAL
HEART SCORE: 5
HISTORY: MODERATELY SUSPICIOUS
RISK FACTORS: >2 RISK FACTORS OR HX OF ATHEROSCLEROTIC DISEASE
TROPONIN: LESS THAN OR EQUAL TO NORMAL LIMIT

## 2024-02-07 ASSESSMENT — PAIN DESCRIPTION - DESCRIPTORS: DESCRIPTORS: DULL

## 2024-02-07 ASSESSMENT — LIFESTYLE VARIABLES
EVER FELT BAD OR GUILTY ABOUT YOUR DRINKING: NO
HAVE PEOPLE ANNOYED YOU BY CRITICIZING YOUR DRINKING: NO
EVER HAD A DRINK FIRST THING IN THE MORNING TO STEADY YOUR NERVES TO GET RID OF A HANGOVER: NO
HAVE YOU EVER FELT YOU SHOULD CUT DOWN ON YOUR DRINKING: NO

## 2024-02-08 ENCOUNTER — APPOINTMENT (OUTPATIENT)
Dept: RADIOLOGY | Facility: HOSPITAL | Age: 64
DRG: 684 | End: 2024-02-08
Payer: COMMERCIAL

## 2024-02-08 ENCOUNTER — APPOINTMENT (OUTPATIENT)
Dept: CARDIOLOGY | Facility: HOSPITAL | Age: 64
DRG: 684 | End: 2024-02-08
Payer: COMMERCIAL

## 2024-02-08 VITALS
TEMPERATURE: 97.3 F | DIASTOLIC BLOOD PRESSURE: 74 MMHG | HEART RATE: 67 BPM | SYSTOLIC BLOOD PRESSURE: 170 MMHG | RESPIRATION RATE: 16 BRPM | OXYGEN SATURATION: 94 % | BODY MASS INDEX: 33.42 KG/M2 | WEIGHT: 165.79 LBS | HEIGHT: 59 IN

## 2024-02-08 PROBLEM — D50.9 IRON DEFICIENCY ANEMIA: Status: ACTIVE | Noted: 2024-02-08

## 2024-02-08 PROBLEM — N17.9 ACUTE KIDNEY INJURY (CMS-HCC): Status: ACTIVE | Noted: 2024-02-08

## 2024-02-08 PROBLEM — R19.01 RIGHT UPPER QUADRANT ABDOMINAL MASS: Status: ACTIVE | Noted: 2024-02-08

## 2024-02-08 PROBLEM — E83.42 HYPOMAGNESEMIA: Status: ACTIVE | Noted: 2024-02-08

## 2024-02-08 LAB
ALBUMIN SERPL BCP-MCNC: 3.5 G/DL (ref 3.4–5)
ALP SERPL-CCNC: 45 U/L (ref 33–136)
ALT SERPL W P-5'-P-CCNC: 15 U/L (ref 7–45)
ANION GAP SERPL CALC-SCNC: 13 MMOL/L (ref 10–20)
AST SERPL W P-5'-P-CCNC: 17 U/L (ref 9–39)
ATRIAL RATE: 71 BPM
BILIRUB SERPL-MCNC: 0.3 MG/DL (ref 0–1.2)
BUN SERPL-MCNC: 24 MG/DL (ref 6–23)
CALCIUM SERPL-MCNC: 9.2 MG/DL (ref 8.6–10.3)
CARDIAC TROPONIN I PNL SERPL HS: 3 NG/L (ref 0–13)
CHLORIDE SERPL-SCNC: 103 MMOL/L (ref 98–107)
CO2 SERPL-SCNC: 26 MMOL/L (ref 21–32)
CREAT SERPL-MCNC: 1.01 MG/DL (ref 0.5–1.05)
EGFRCR SERPLBLD CKD-EPI 2021: 62 ML/MIN/1.73M*2
ERYTHROCYTE [DISTWIDTH] IN BLOOD BY AUTOMATED COUNT: 15.7 % (ref 11.5–14.5)
FOLATE SERPL-MCNC: 6.1 NG/ML
GLUCOSE SERPL-MCNC: 99 MG/DL (ref 74–99)
HCT VFR BLD AUTO: 28.4 % (ref 36–46)
HGB BLD-MCNC: 8.7 G/DL (ref 12–16)
HOLD SPECIMEN: NORMAL
IRON SATN MFR SERPL: 5 % (ref 25–45)
IRON SERPL-MCNC: 21 UG/DL (ref 35–150)
MAGNESIUM SERPL-MCNC: 2.08 MG/DL (ref 1.6–2.4)
MCH RBC QN AUTO: 24.6 PG (ref 26–34)
MCHC RBC AUTO-ENTMCNC: 30.6 G/DL (ref 32–36)
MCV RBC AUTO: 81 FL (ref 80–100)
NRBC BLD-RTO: 0 /100 WBCS (ref 0–0)
P AXIS: 56 DEGREES
P OFFSET: 202 MS
P ONSET: 155 MS
PLATELET # BLD AUTO: 204 X10*3/UL (ref 150–450)
POTASSIUM SERPL-SCNC: 3.8 MMOL/L (ref 3.5–5.3)
PR INTERVAL: 118 MS
PROT SERPL-MCNC: 7 G/DL (ref 6.4–8.2)
Q ONSET: 214 MS
QRS COUNT: 12 BEATS
QRS DURATION: 88 MS
QT INTERVAL: 424 MS
QTC CALCULATION(BAZETT): 460 MS
QTC FREDERICIA: 448 MS
R AXIS: -24 DEGREES
RBC # BLD AUTO: 3.53 X10*6/UL (ref 4–5.2)
SODIUM SERPL-SCNC: 138 MMOL/L (ref 136–145)
T AXIS: 52 DEGREES
T OFFSET: 426 MS
TIBC SERPL-MCNC: 419 UG/DL (ref 240–445)
UIBC SERPL-MCNC: 398 UG/DL (ref 110–370)
VENTRICULAR RATE: 71 BPM
VIT B12 SERPL-MCNC: 380 PG/ML (ref 211–911)
WBC # BLD AUTO: 9.8 X10*3/UL (ref 4.4–11.3)

## 2024-02-08 PROCEDURE — 82607 VITAMIN B-12: CPT | Performed by: STUDENT IN AN ORGANIZED HEALTH CARE EDUCATION/TRAINING PROGRAM

## 2024-02-08 PROCEDURE — 84484 ASSAY OF TROPONIN QUANT: CPT | Performed by: INTERNAL MEDICINE

## 2024-02-08 PROCEDURE — 93005 ELECTROCARDIOGRAM TRACING: CPT

## 2024-02-08 PROCEDURE — 36415 COLL VENOUS BLD VENIPUNCTURE: CPT | Performed by: INTERNAL MEDICINE

## 2024-02-08 PROCEDURE — 2500000001 HC RX 250 WO HCPCS SELF ADMINISTERED DRUGS (ALT 637 FOR MEDICARE OP): Performed by: INTERNAL MEDICINE

## 2024-02-08 PROCEDURE — 83540 ASSAY OF IRON: CPT | Performed by: STUDENT IN AN ORGANIZED HEALTH CARE EDUCATION/TRAINING PROGRAM

## 2024-02-08 PROCEDURE — 83735 ASSAY OF MAGNESIUM: CPT | Performed by: INTERNAL MEDICINE

## 2024-02-08 PROCEDURE — G0378 HOSPITAL OBSERVATION PER HR: HCPCS

## 2024-02-08 PROCEDURE — 96375 TX/PRO/DX INJ NEW DRUG ADDON: CPT

## 2024-02-08 PROCEDURE — 1200000002 HC GENERAL ROOM WITH TELEMETRY DAILY

## 2024-02-08 PROCEDURE — 82746 ASSAY OF FOLIC ACID SERUM: CPT | Performed by: STUDENT IN AN ORGANIZED HEALTH CARE EDUCATION/TRAINING PROGRAM

## 2024-02-08 PROCEDURE — C9113 INJ PANTOPRAZOLE SODIUM, VIA: HCPCS | Performed by: INTERNAL MEDICINE

## 2024-02-08 PROCEDURE — 2500000001 HC RX 250 WO HCPCS SELF ADMINISTERED DRUGS (ALT 637 FOR MEDICARE OP): Performed by: NURSE PRACTITIONER

## 2024-02-08 PROCEDURE — 74176 CT ABD & PELVIS W/O CONTRAST: CPT

## 2024-02-08 PROCEDURE — 80053 COMPREHEN METABOLIC PANEL: CPT | Performed by: INTERNAL MEDICINE

## 2024-02-08 PROCEDURE — 99232 SBSQ HOSP IP/OBS MODERATE 35: CPT | Performed by: STUDENT IN AN ORGANIZED HEALTH CARE EDUCATION/TRAINING PROGRAM

## 2024-02-08 PROCEDURE — 74176 CT ABD & PELVIS W/O CONTRAST: CPT | Performed by: RADIOLOGY

## 2024-02-08 PROCEDURE — 99223 1ST HOSP IP/OBS HIGH 75: CPT | Performed by: INTERNAL MEDICINE

## 2024-02-08 PROCEDURE — 2500000004 HC RX 250 GENERAL PHARMACY W/ HCPCS (ALT 636 FOR OP/ED): Performed by: INTERNAL MEDICINE

## 2024-02-08 PROCEDURE — 93010 ELECTROCARDIOGRAM REPORT: CPT | Performed by: INTERNAL MEDICINE

## 2024-02-08 PROCEDURE — 85027 COMPLETE CBC AUTOMATED: CPT | Performed by: INTERNAL MEDICINE

## 2024-02-08 RX ORDER — FLUOXETINE HYDROCHLORIDE 20 MG/1
20 CAPSULE ORAL DAILY
Status: DISCONTINUED | OUTPATIENT
Start: 2024-02-08 | End: 2024-02-09 | Stop reason: HOSPADM

## 2024-02-08 RX ORDER — LANOLIN ALCOHOL/MO/W.PET/CERES
400 CREAM (GRAM) TOPICAL DAILY
Status: DISCONTINUED | OUTPATIENT
Start: 2024-02-08 | End: 2024-02-09 | Stop reason: HOSPADM

## 2024-02-08 RX ORDER — ATORVASTATIN CALCIUM 80 MG/1
80 TABLET, FILM COATED ORAL DAILY
Status: DISCONTINUED | OUTPATIENT
Start: 2024-02-08 | End: 2024-02-09 | Stop reason: HOSPADM

## 2024-02-08 RX ORDER — CHOLECALCIFEROL (VITAMIN D3) 25 MCG
1000 TABLET ORAL DAILY
Status: DISCONTINUED | OUTPATIENT
Start: 2024-02-08 | End: 2024-02-09 | Stop reason: HOSPADM

## 2024-02-08 RX ORDER — CARVEDILOL 12.5 MG/1
25 TABLET ORAL 2 TIMES DAILY
Status: DISCONTINUED | OUTPATIENT
Start: 2024-02-08 | End: 2024-02-09 | Stop reason: HOSPADM

## 2024-02-08 RX ORDER — LOSARTAN POTASSIUM 100 MG/1
100 TABLET ORAL DAILY
Status: DISCONTINUED | OUTPATIENT
Start: 2024-02-08 | End: 2024-02-09 | Stop reason: HOSPADM

## 2024-02-08 RX ORDER — AMLODIPINE BESYLATE 5 MG/1
5 TABLET ORAL DAILY
Status: DISCONTINUED | OUTPATIENT
Start: 2024-02-08 | End: 2024-02-09 | Stop reason: HOSPADM

## 2024-02-08 RX ORDER — ASPIRIN 81 MG/1
81 TABLET ORAL DAILY
Status: DISCONTINUED | OUTPATIENT
Start: 2024-02-08 | End: 2024-02-09 | Stop reason: HOSPADM

## 2024-02-08 RX ORDER — POTASSIUM CHLORIDE 750 MG/1
10 TABLET, FILM COATED, EXTENDED RELEASE ORAL DAILY
Status: DISCONTINUED | OUTPATIENT
Start: 2024-02-08 | End: 2024-02-09 | Stop reason: HOSPADM

## 2024-02-08 RX ADMIN — PANTOPRAZOLE SODIUM 40 MG: 40 INJECTION, POWDER, FOR SOLUTION INTRAVENOUS at 06:50

## 2024-02-08 RX ADMIN — MAGNESIUM OXIDE 400 MG (241.3 MG MAGNESIUM) TABLET 400 MG: TABLET at 08:35

## 2024-02-08 RX ADMIN — TICAGRELOR 90 MG: 90 TABLET ORAL at 08:35

## 2024-02-08 RX ADMIN — FLUOXETINE HYDROCHLORIDE 20 MG: 20 CAPSULE ORAL at 08:35

## 2024-02-08 RX ADMIN — ACETAMINOPHEN 650 MG: 325 TABLET ORAL at 21:24

## 2024-02-08 RX ADMIN — TICAGRELOR 90 MG: 90 TABLET ORAL at 21:25

## 2024-02-08 RX ADMIN — CARVEDILOL 25 MG: 12.5 TABLET, FILM COATED ORAL at 21:24

## 2024-02-08 RX ADMIN — AMLODIPINE BESYLATE 5 MG: 5 TABLET ORAL at 11:26

## 2024-02-08 RX ADMIN — SODIUM CHLORIDE, POTASSIUM CHLORIDE, SODIUM LACTATE AND CALCIUM CHLORIDE 50 ML/HR: 600; 310; 30; 20 INJECTION, SOLUTION INTRAVENOUS at 01:09

## 2024-02-08 RX ADMIN — ENOXAPARIN SODIUM 40 MG: 40 INJECTION SUBCUTANEOUS at 01:14

## 2024-02-08 RX ADMIN — POTASSIUM CHLORIDE 10 MEQ: 750 TABLET, EXTENDED RELEASE ORAL at 08:35

## 2024-02-08 RX ADMIN — Medication 6 MG: at 04:03

## 2024-02-08 RX ADMIN — ASPIRIN 81 MG: 81 TABLET, COATED ORAL at 08:34

## 2024-02-08 RX ADMIN — CARVEDILOL 25 MG: 12.5 TABLET, FILM COATED ORAL at 08:35

## 2024-02-08 RX ADMIN — ATORVASTATIN CALCIUM 80 MG: 80 TABLET, FILM COATED ORAL at 08:35

## 2024-02-08 RX ADMIN — Medication 1000 UNITS: at 08:35

## 2024-02-08 RX ADMIN — LOSARTAN POTASSIUM 100 MG: 100 TABLET, FILM COATED ORAL at 08:35

## 2024-02-08 RX ADMIN — ENOXAPARIN SODIUM 40 MG: 40 INJECTION SUBCUTANEOUS at 21:24

## 2024-02-08 SDOH — SOCIAL STABILITY: SOCIAL INSECURITY: WERE YOU ABLE TO COMPLETE ALL THE BEHAVIORAL HEALTH SCREENINGS?: YES

## 2024-02-08 SDOH — SOCIAL STABILITY: SOCIAL INSECURITY: ARE THERE ANY APPARENT SIGNS OF INJURIES/BEHAVIORS THAT COULD BE RELATED TO ABUSE/NEGLECT?: NO

## 2024-02-08 SDOH — SOCIAL STABILITY: SOCIAL INSECURITY: DO YOU FEEL ANYONE HAS EXPLOITED OR TAKEN ADVANTAGE OF YOU FINANCIALLY OR OF YOUR PERSONAL PROPERTY?: NO

## 2024-02-08 SDOH — SOCIAL STABILITY: SOCIAL INSECURITY: DO YOU FEEL UNSAFE GOING BACK TO THE PLACE WHERE YOU ARE LIVING?: NO

## 2024-02-08 SDOH — SOCIAL STABILITY: SOCIAL INSECURITY: DOES ANYONE TRY TO KEEP YOU FROM HAVING/CONTACTING OTHER FRIENDS OR DOING THINGS OUTSIDE YOUR HOME?: NO

## 2024-02-08 SDOH — SOCIAL STABILITY: SOCIAL INSECURITY: ARE YOU OR HAVE YOU BEEN THREATENED OR ABUSED PHYSICALLY, EMOTIONALLY, OR SEXUALLY BY ANYONE?: NO

## 2024-02-08 SDOH — SOCIAL STABILITY: SOCIAL INSECURITY: HAS ANYONE EVER THREATENED TO HURT YOUR FAMILY OR YOUR PETS?: NO

## 2024-02-08 SDOH — SOCIAL STABILITY: SOCIAL INSECURITY: HAVE YOU HAD THOUGHTS OF HARMING ANYONE ELSE?: NO

## 2024-02-08 SDOH — SOCIAL STABILITY: SOCIAL INSECURITY: ABUSE: ADULT

## 2024-02-08 ASSESSMENT — COGNITIVE AND FUNCTIONAL STATUS - GENERAL
DAILY ACTIVITIY SCORE: 24
PATIENT BASELINE BEDBOUND: NO
MOBILITY SCORE: 24
MOBILITY SCORE: 24
DAILY ACTIVITIY SCORE: 24

## 2024-02-08 ASSESSMENT — ACTIVITIES OF DAILY LIVING (ADL)
TOILETING: INDEPENDENT
WALKS IN HOME: INDEPENDENT
BATHING: INDEPENDENT
LACK_OF_TRANSPORTATION: NO
LACK_OF_TRANSPORTATION: NO
HEARING - LEFT EAR: FUNCTIONAL
HEARING - RIGHT EAR: FUNCTIONAL
GROOMING: INDEPENDENT
ADEQUATE_TO_COMPLETE_ADL: YES
DRESSING YOURSELF: INDEPENDENT
FEEDING YOURSELF: INDEPENDENT
PATIENT'S MEMORY ADEQUATE TO SAFELY COMPLETE DAILY ACTIVITIES?: YES
JUDGMENT_ADEQUATE_SAFELY_COMPLETE_DAILY_ACTIVITIES: YES

## 2024-02-08 ASSESSMENT — LIFESTYLE VARIABLES
HOW OFTEN DO YOU HAVE A DRINK CONTAINING ALCOHOL: NEVER
HOW OFTEN DO YOU HAVE 6 OR MORE DRINKS ON ONE OCCASION: NEVER
SUBSTANCE_ABUSE_PAST_12_MONTHS: NO
AUDIT-C TOTAL SCORE: 0
HOW MANY STANDARD DRINKS CONTAINING ALCOHOL DO YOU HAVE ON A TYPICAL DAY: PATIENT DOES NOT DRINK
PRESCIPTION_ABUSE_PAST_12_MONTHS: NO
AUDIT-C TOTAL SCORE: 0
SKIP TO QUESTIONS 9-10: 1

## 2024-02-08 ASSESSMENT — PAIN - FUNCTIONAL ASSESSMENT
PAIN_FUNCTIONAL_ASSESSMENT: 0-10
PAIN_FUNCTIONAL_ASSESSMENT: 0-10

## 2024-02-08 ASSESSMENT — PATIENT HEALTH QUESTIONNAIRE - PHQ9
SUM OF ALL RESPONSES TO PHQ9 QUESTIONS 1 & 2: 0
2. FEELING DOWN, DEPRESSED OR HOPELESS: NOT AT ALL
1. LITTLE INTEREST OR PLEASURE IN DOING THINGS: NOT AT ALL

## 2024-02-08 ASSESSMENT — PAIN SCALES - GENERAL
PAINLEVEL_OUTOF10: 5 - MODERATE PAIN
PAINLEVEL_OUTOF10: 0 - NO PAIN

## 2024-02-08 NOTE — ED PROVIDER NOTES
63-year-old female presents emergency department with chief complaint of chest pain and episode of feeling like she may pass out at work.  Patient reports that she was at work when she all of a sudden felt very hot and sweaty and like she may pass out.  She states that she also felt confused.  She reports she tried to use the phone and could not figure out how to do.  In addition, she states she felt like she was having trouble speaking.  Patient reports that she also had multiple episodes of vomiting.  She states she did not pass out or hit her head.  She does report that her symptoms have improved since vomiting.  However, she states she is having continued chest pain.  She describes her chest pain as a left-sided pressure-like discomfort.  She denies fevers, coughing, or congestion.  Denies any headache.  No abdominal pain.  No hematemesis.  Patient denies any dysuria, diarrhea, constipation, black or bloody stools.  Does report significant past medical history of recent MI with multiple stents in October.  She reports previous tobacco use.  She states she is on Brilinta, but has had multiple nosebleeds related to this. Chart review shows significant past medical history of hypertension, GERD, hyperlipidemia, fatty liver disease, anxiety, anemia, chronic kidney disease, CAD, and bilateral carotid artery stenosis.  No reported illicit drug use or regular alcohol use.      History provided by:  Patient   used: No           ------------------------------------------------------------------------------------------------------------------------------------------    VS: As documented in the triage note and EMR flowsheet from this visit were reviewed.    Review of Systems  Constitutional: no fever, chills reports sweating during episode  Eyes: no redness, discharge, pain  HENT: no sore throat, nose bleeds, congestion, rhinorrhea   Cardiovascular: Admits to left-sided chest pressure pain no leg edema,  palpitations  Respiratory: Reports shortness of breath during episode no cough or wheezing  GI: Admits to nausea and vomiting no pain, diarrhea, constipation, BRBPR, melena  : no dysuria, frequency, hematuria  Musculoskeletal: no neck pain, stiffness,  no joint deformity, swelling  Skin: no rash, erythema, wounds  Neurological: no headache, dizziness, weakness, numbness, or tingling reports difficulty speaking during episode.  Admits to feeling lightheaded like she may pass out.  Psychiatric: no suicidal thoughts, agitation admits to confusion during episode  Metabolic: no polyuria or polydipsia  Hematologic: Patient is on Brilinta and aspirin  Immunology: No immunocompromise state    [facility]  Nursing notes reviewed and confirmed by me.  Chart review performed including medications, allergies, and medical, surgical, and family history  Visit Vitals  /71 (BP Location: Right arm, Patient Position: Lying)   Pulse 66   Temp 36 °C (96.8 °F) (Temporal)   Resp 18     Physical Exam  Vitals and nursing note reviewed.   Constitutional:       General: She is not in acute distress.     Appearance: Normal appearance. She is not ill-appearing.   HENT:      Head: Normocephalic and atraumatic.      Nose: Nose normal. No congestion or rhinorrhea.      Mouth/Throat:      Mouth: Mucous membranes are moist.      Pharynx: No oropharyngeal exudate or posterior oropharyngeal erythema.   Eyes:      Extraocular Movements: Extraocular movements intact.      Conjunctiva/sclera: Conjunctivae normal.      Pupils: Pupils are equal, round, and reactive to light.   Cardiovascular:      Rate and Rhythm: Normal rate and regular rhythm.      Pulses: Normal pulses.      Heart sounds: Normal heart sounds.   Pulmonary:      Effort: Pulmonary effort is normal. No respiratory distress.      Breath sounds: Normal breath sounds. No stridor. No wheezing, rhonchi or rales.   Abdominal:      General: There is no distension.      Palpations: Abdomen is  soft.      Tenderness: There is no abdominal tenderness. There is no guarding or rebound.   Musculoskeletal:         General: No swelling or deformity. Normal range of motion.      Cervical back: Normal range of motion and neck supple. No rigidity.      Right lower leg: No edema.      Left lower leg: No edema.      Comments: No calf tenderness    Skin:     General: Skin is warm and dry.      Capillary Refill: Capillary refill takes less than 2 seconds.      Coloration: Skin is not jaundiced.      Findings: No rash.   Neurological:      General: No focal deficit present.      Mental Status: She is alert and oriented to person, place, and time.      Sensory: No sensory deficit.      Motor: No weakness.      Comments: Cranial nerves II through XII grossly intact.  NIH stroke scale is 0.   Psychiatric:         Mood and Affect: Mood normal.         Behavior: Behavior normal.        Past Medical History:   Diagnosis Date    Anxiety     Hyperlipidemia       Past Surgical History:   Procedure Laterality Date    CARDIAC CATHETERIZATION N/A 10/2/2023    Procedure: PCI CARLOS ALBERTO Stent- Coronary;  Surgeon: Nish Parnell MD;  Location: [facility];  Service: Cardiovascular;  Laterality: N/A;  RCA PCI. Please schedule to follow my 2nd case of the afternoon    CARDIAC CATHETERIZATION N/A 10/2/2023    Procedure: IVUS - Coronary;  Surgeon: Nish Parnell MD;  Location: [facility];  Service: Cardiovascular;  Laterality: N/A;    CARDIAC CATHETERIZATION N/A 10/1/2023    Procedure: Left Heart Cath, No LV;  Surgeon: Nish Parnell MD;  Location: [facility];  Service: Cardiovascular;  Laterality: N/A;    CARDIAC CATHETERIZATION N/A 10/1/2023    Procedure: PCI;  Surgeon: Nish Parnell MD;  Location: [facility];  Service: Cardiovascular;  Laterality: N/A;    CARDIAC CATHETERIZATION N/A 10/6/2023    Procedure: Left Heart Cath;  Surgeon: Nish Mendes  MD Parmjit;  Location: [facility];  Service: Cardiovascular;  Laterality: N/A;    CARDIAC CATHETERIZATION N/A 10/6/2023    Procedure: PCI CARLOS ALBERTO Stent- Coronary;  Surgeon: Nish Parnell MD;  Location: [facility];  Service: Cardiovascular;  Laterality: N/A;  RCA PCI with Shockwave. Please schedule at 9 am if possible    CARDIAC CATHETERIZATION N/A 10/6/2023    Procedure: IVUS - Coronary;  Surgeon: Nish Parnell MD;  Location: [facility];  Service: Cardiovascular;  Laterality: N/A;    CORONARY ANGIOPLASTY WITH STENT PLACEMENT      US ASPIRATION INJECTION INTERMEDIATE JOINT  2019    US ASPIRATION INJECTION INTERMEDIATE JOINT 2019 [facility]    US GUIDED THYROID BIOPSY  2019    US GUIDED THYROID BIOPSY 2019 [facility]      Social History     Socioeconomic History    Marital status:      Spouse name: Not on file    Number of children: Not on file    Years of education: Not on file    Highest education level: Not on file   Occupational History    Not on file   Tobacco Use    Smoking status: Former     Packs/day: 1.00     Years: 15.00     Additional pack years: 0.00     Total pack years: 15.00     Types: Cigarettes     Quit date: 2023     Years since quittin.3    Smokeless tobacco: Never   Vaping Use    Vaping Use: Never used   Substance and Sexual Activity    Alcohol use: Never    Drug use: Never    Sexual activity: Defer   Other Topics Concern    Not on file   Social History Narrative    Not on file     Social Determinants of Health     Financial Resource Strain: Low Risk  (10/2/2023)    Overall Financial Resource Strain (CARDIA)     Difficulty of Paying Living Expenses: Not hard at all   Food Insecurity: No Food Insecurity (10/2/2023)    Hunger Vital Sign     Worried About Running Out of Food in the Last Year: Never true     Ran Out of Food in the Last Year: Never true   Transportation Needs: No  Transportation Needs (10/2/2023)    PRAPARE - Transportation     Lack of Transportation (Medical): No     Lack of Transportation (Non-Medical): No   Physical Activity: Sufficiently Active (10/2/2023)    Exercise Vital Sign     Days of Exercise per Week: 5 days     Minutes of Exercise per Session: 30 min   Stress: Stress Concern Present (10/2/2023)    [place] [employer] - Occupational Stress Questionnaire     Feeling of Stress : To some extent   Social Connections: Moderately Isolated (10/2/2023)    Social Connection and Isolation Panel [NHANES]     Frequency of Communication with Friends and Family: More than three times a week     Frequency of Social Gatherings with Friends and Family: Once a week     Attends Druze Services: Never     Active Member of Clubs or Organizations: No     Attends Club or Organization Meetings: Never     Marital Status:    Intimate Partner Violence: Not At Risk (10/2/2023)    Humiliation, Afraid, Rape, and Kick questionnaire     Fear of Current or Ex-Partner: No     Emotionally Abused: No     Physically Abused: No     Sexually Abused: No   Housing Stability: Low Risk  (10/2/2023)    Housing Stability Vital Sign     Unable to Pay for Housing in the Last Year: No     Number of Places Lived in the Last Year: 1     Unstable Housing in the Last Year: No      ------------------------------------------------------------------------------------------------------------------------------------------  CT head wo IV contrast   Final Result   1.  No evidence of hemorrhage, CT apparent transcortical infarct, or   other acute intracranial abnormality.   2. Subtle attenuation changes present in the periventricular and   subcortical white matter of bilateral cerebral hemispheres are   nonspecific, though favored to represent sequela of microvascular   disease.        MACRO:   None        Signed by: Gerson Pelayo 2/7/2024 8:56 PM   Dictation workstation:    [id number]      XR chest 1 view   Final Result   1. Bronchial thickening. No focal infiltrate.                  MACRO:   None        Signed by: Pavel Veliz 2/7/2024 8:43 PM   Dictation workstation:   [id number]      CT angio chest for pulmonary embolism    (Results Pending)      Labs Reviewed   CBC WITH AUTO DIFFERENTIAL - Abnormal       Result Value    WBC 11.7 (*)     nRBC 0.0      RBC 4.16      Hemoglobin 10.4 (*)     Hematocrit 33.7 (*)     MCV 81      MCH 25.0 (*)     MCHC 30.9 (*)     RDW 15.6 (*)     Platelets 290      Neutrophils % 81.2      Immature Granulocytes %, Automated 0.5      Lymphocytes % 8.6      Monocytes % 5.9      Eosinophils % 3.2      Basophils % 0.6      Neutrophils Absolute 9.49 (*)     Immature Granulocytes Absolute, Automated 0.06      Lymphocytes Absolute 1.01 (*)     Monocytes Absolute 0.69      Eosinophils Absolute 0.38      Basophils Absolute 0.07     COMPREHENSIVE METABOLIC PANEL - Abnormal    Glucose 112 (*)     Sodium 138      Potassium 4.2      Chloride 102      Bicarbonate 26      Anion Gap 14      Urea Nitrogen 25 (*)     Creatinine 1.31 (*)     eGFR 45 (*)     Calcium 9.5      Albumin 4.1      Alkaline Phosphatase 52      Total Protein 7.9      AST 23      Bilirubin, Total 0.6      ALT 17     MAGNESIUM - Abnormal    Magnesium 1.49 (*)    D-DIMER, VTE EXCLUSION - Abnormal    D-Dimer, Quantitative VTE Exclusion 540 (*)     Narrative:     The VTE Exclusion D-Dimer assay is reported in ng/mL Fibrinogen Equivalent Units (FEU).    Per 's instructions for use, a value of less than 500 ng/mL (FEU) may help to exclude DVT or PE in outpatients when the assay is used with a clinical pretest probability assessment.([facility] must utilize and document eCalc 'Wells Score Deep Vein Thrombosis Risk' for DVT exclusion only. Emergency Department should utilize  Guidelines for Emergency Department Use of the VTE Exclusion D-Dimer and Clinical Pretest probability assessment model  for DVT or PE exclusion.)   LIPASE - Normal    Lipase 39      Narrative:     Venipuncture immediately after or during the administration of Metamizole may lead to falsely low results. Testing should be performed immediately prior to Metamizole dosing.   PROTIME-INR - Normal    Protime 12.7      INR 1.1     APTT - Normal    aPTT 30      Narrative:     The APTT is no longer used for monitoring Unfractionated Heparin Therapy. For monitoring Heparin Therapy, use the Heparin Assay.   SARS-COV-2 AND INFLUENZA A/B PCR - Normal    Flu A Result Not Detected      Flu B Result Not Detected      Coronavirus 2019, PCR Not Detected      Narrative:     This assay has received FDA Emergency Use Authorization (EUA) and  is only authorized for the duration of time that circumstances exist to justify the authorization of the emergency use of in vitro diagnostic tests for the detection of SARS-CoV-2 virus and/or diagnosis of COVID-19 infection under section 564(b)(1) of the Act, 21 U.S.C. 360bbb-3(b)(1). Testing for SARS-CoV-2 is only recommended for patients who meet current clinical and/or epidemiological criteria as defined by federal, state, or local public health directives. This assay is an in vitro diagnostic nucleic acid amplification test for the qualitative detection of SARS-CoV-2, Influenza A, and Influenza B from nasopharyngeal specimens and has been validated for use at [facility]. Negative results do not preclude COVID-19 infections or Influenza A/B infections, and should not be used as the sole basis for diagnosis, treatment, or other management decisions. If Influenza A/B and RSV PCR results are negative, testing for Parainfluenza virus, Adenovirus and Metapneumovirus is routinely performed for [facility] pediatric oncology and intensive care inpatients, and is available on other patients by placing an add-on request.    SERIAL TROPONIN-INITIAL - Normal    Troponin I, High Sensitivity <3      Narrative:      Less than 99th percentile of normal range cutoff-  Female and children under 18 years old <14 ng/L; Male <21 ng/L: Negative  Repeat testing should be performed if clinically indicated.     Female and children under 18 years old 14-50 ng/L; Male 21-50 ng/L:  Consistent with possible cardiac damage and possible increased clinical   risk. Serial measurements may help to assess extent of myocardial damage.     >50 ng/L: Consistent with cardiac damage, increased clinical risk and  myocardial infarction. Serial measurements may help assess extent of   myocardial damage.      NOTE: Children less than 1 year old may have higher baseline troponin   levels and results should be interpreted in conjunction with the overall   clinical context.     NOTE: Troponin I testing is performed using a different   testing methodology at [facility] than at other   [facility]. Direct result comparisons should only   be made within the same method.   TROPONIN SERIES- (INITIAL, 1 HR)    Narrative:     The following orders were created for panel order Troponin I Series, High Sensitivity (0, 1 HR).  Procedure                               Abnormality         Status                     ---------                               -----------         ------                     Troponin I, High Sensiti...[[id number]]  Normal              Final result               Troponin, High Sensitivi...[[id number]]                                                   Please view results for these tests on the individual orders.   SERIAL TROPONIN, 1 HOUR        Medical Decision Making  EKG interpreted by ED physician: Normal sinus rhythm rate of 71.  NM, QRS, QTc intervals are within normal limits.  No significant ST elevations or depressions.  No significant Q waves.  Good R wave progression.  Left axis deviation.    63-year-old female presents emergency department with complaints of chest pain and episode of feeling like she may pass out as  well as associated confusion and difficulty speaking.  On my exam of the patient she is afebrile nontoxic.  She has no focal neurologic deficit and reports that her symptoms of near syncope and confusion have resolved.  A thorough workup was obtained given her presenting symptoms.  Flu and COVID testing are negative.  CBC shows nonspecific leukocytosis and mild anemia improved from previous.  Patient does not have findings of sepsis.  Patient's magnesium is found to be low and this was repleted via IV.  Initial cardiac enzyme and EKG show no acute ACS or significant arrhythmia.  CMP shows mild acute kidney injury and patient given IV fluid bolus.  Lipase within normal range I do not suspect pancreatitis.  Head CT shows no acute intracranial process such as hemorrhage or mass effect.  Chest x-ray does not show any acute cardiopulmonary process such as pneumonia, pleural effusion, or pulmonary edema.  At the end of my shift repeat cardiac enzyme and PE study are pending along with reevaluation and disposition.  I suspect given the patient's presenting symptoms and history of cardiac disease she will require admission for further evaluation and treatment of her symptoms.  Patient has heart score of 5.  Patient signed out to Dr. Delatorre.  Patient is given aspirin.       Diagnoses as of 02/07/24 2140   Chest pain, unspecified type   Altered mental status, unspecified altered mental status type   Hypomagnesemia      1. Chest pain, unspecified type        2. Altered mental status, unspecified altered mental status type        3. Hypomagnesemia           Procedures     This note was dictated using dragon software and may contain errors related to dictation interpretation errors.      Flako Barbosa, DO  02/07/24 2140

## 2024-02-08 NOTE — ED PROVIDER NOTES
Patient was signed out to me Dr. Barbosa to reassess troponin and CT of the chest  Labs Reviewed   CBC WITH AUTO DIFFERENTIAL - Abnormal       Result Value    WBC 11.7 (*)     nRBC 0.0      RBC 4.16      Hemoglobin 10.4 (*)     Hematocrit 33.7 (*)     MCV 81      MCH 25.0 (*)     MCHC 30.9 (*)     RDW 15.6 (*)     Platelets 290      Neutrophils % 81.2      Immature Granulocytes %, Automated 0.5      Lymphocytes % 8.6      Monocytes % 5.9      Eosinophils % 3.2      Basophils % 0.6      Neutrophils Absolute 9.49 (*)     Immature Granulocytes Absolute, Automated 0.06      Lymphocytes Absolute 1.01 (*)     Monocytes Absolute 0.69      Eosinophils Absolute 0.38      Basophils Absolute 0.07     COMPREHENSIVE METABOLIC PANEL - Abnormal    Glucose 112 (*)     Sodium 138      Potassium 4.2      Chloride 102      Bicarbonate 26      Anion Gap 14      Urea Nitrogen 25 (*)     Creatinine 1.31 (*)     eGFR 45 (*)     Calcium 9.5      Albumin 4.1      Alkaline Phosphatase 52      Total Protein 7.9      AST 23      Bilirubin, Total 0.6      ALT 17     MAGNESIUM - Abnormal    Magnesium 1.49 (*)    D-DIMER, VTE EXCLUSION - Abnormal    D-Dimer, Quantitative VTE Exclusion 540 (*)     Narrative:     The VTE Exclusion D-Dimer assay is reported in ng/mL Fibrinogen Equivalent Units (FEU).    Per 's instructions for use, a value of less than 500 ng/mL (FEU) may help to exclude DVT or PE in outpatients when the assay is used with a clinical pretest probability assessment.([facility] must utilize and document eCalc 'Wells Score Deep Vein Thrombosis Risk' for DVT exclusion only. Emergency Department should utilize  Guidelines for Emergency Department Use of the VTE Exclusion D-Dimer and Clinical Pretest probability assessment model for DVT or PE exclusion.)   LIPASE - Normal    Lipase 39      Narrative:     Venipuncture immediately after or during the administration of Metamizole may lead to falsely low results. Testing should be  performed immediately prior to Metamizole dosing.   PROTIME-INR - Normal    Protime 12.7      INR 1.1     APTT - Normal    aPTT 30      Narrative:     The APTT is no longer used for monitoring Unfractionated Heparin Therapy. For monitoring Heparin Therapy, use the Heparin Assay.   SARS-COV-2 AND INFLUENZA A/B PCR - Normal    Flu A Result Not Detected      Flu B Result Not Detected      Coronavirus 2019, PCR Not Detected      Narrative:     This assay has received FDA Emergency Use Authorization (EUA) and  is only authorized for the duration of time that circumstances exist to justify the authorization of the emergency use of in vitro diagnostic tests for the detection of SARS-CoV-2 virus and/or diagnosis of COVID-19 infection under section 564(b)(1) of the Act, 21 U.S.C. 360bbb-3(b)(1). Testing for SARS-CoV-2 is only recommended for patients who meet current clinical and/or epidemiological criteria as defined by federal, state, or local public health directives. This assay is an in vitro diagnostic nucleic acid amplification test for the qualitative detection of SARS-CoV-2, Influenza A, and Influenza B from nasopharyngeal specimens and has been validated for use at [facility]. Negative results do not preclude COVID-19 infections or Influenza A/B infections, and should not be used as the sole basis for diagnosis, treatment, or other management decisions. If Influenza A/B and RSV PCR results are negative, testing for Parainfluenza virus, Adenovirus and Metapneumovirus is routinely performed for [facility] [place] [place], and is available on other patients by placing an add-on request.    SERIAL TROPONIN-INITIAL - Normal    Troponin I, High Sensitivity <3      Narrative:     Less than 99th percentile of normal range cutoff-  Female and children under 18 years old <14 ng/L; Male <21 ng/L: Negative  Repeat testing should be performed if clinically indicated.     Female  and children under 18 years old 14-50 ng/L; Male 21-50 ng/L:  Consistent with possible cardiac damage and possible increased clinical   risk. Serial measurements may help to assess extent of myocardial damage.     >50 ng/L: Consistent with cardiac damage, increased clinical risk and  myocardial infarction. Serial measurements may help assess extent of   myocardial damage.      NOTE: Children less than 1 year old may have higher baseline troponin   levels and results should be interpreted in conjunction with the overall   clinical context.     NOTE: Troponin I testing is performed using a different   testing methodology at [facility] than at other   [facility]. Direct result comparisons should only   be made within the same method.   SERIAL TROPONIN, 1 HOUR - Normal    Troponin I, High Sensitivity 3      Narrative:     Less than 99th percentile of normal range cutoff-  Female and children under 18 years old <14 ng/L; Male <21 ng/L: Negative  Repeat testing should be performed if clinically indicated.     Female and children under 18 years old 14-50 ng/L; Male 21-50 ng/L:  Consistent with possible cardiac damage and possible increased clinical   risk. Serial measurements may help to assess extent of myocardial damage.     >50 ng/L: Consistent with cardiac damage, increased clinical risk and  myocardial infarction. Serial measurements may help assess extent of   myocardial damage.      NOTE: Children less than 1 year old may have higher baseline troponin   levels and results should be interpreted in conjunction with the overall   clinical context.     NOTE: Troponin I testing is performed using a different   testing methodology at [facility] than at other   [facility]. Direct result comparisons should only   be made within the same method.   TROPONIN SERIES- (INITIAL, 1 HR)    Narrative:     The following orders were created for panel order Troponin I Series, High Sensitivity (0,  1 HR).  Procedure                               Abnormality         Status                     ---------                               -----------         ------                     Troponin I, High Sensiti...[[id number]]  Normal              Final result               Troponin, High Sensitivi...[[id number]]  Normal              Final result                 Please view results for these tests on the individual orders.        CT angio chest for pulmonary embolism   Final Result   1. No evidence of pulmonary embolism to the level of the segmental   arteries.   2. No focal airspace disease.   3. Possible mass in the right upper quadrant, incompletely   visualized. Recommend CT of the abdomen and pelvis for further   assessment.        MACRO:   None        Signed by: Jori Guerrero 2/7/2024 10:39 PM   Dictation workstation:   [id number]      CT head wo IV contrast   Final Result   1.  No evidence of hemorrhage, CT apparent transcortical infarct, or   other acute intracranial abnormality.   2. Subtle attenuation changes present in the periventricular and   subcortical white matter of bilateral cerebral hemispheres are   nonspecific, though favored to represent sequela of microvascular   disease.        MACRO:   None        Signed by: Gerson Pelayo 2/7/2024 8:56 PM   Dictation workstation:   [id number]      XR chest 1 view   Final Result   1. Bronchial thickening. No focal infiltrate.                  MACRO:   None        Signed by: Pavel Veliz 2/7/2024 8:43 PM   Dictation workstation:   [id number]         Serial troponins were negative CT of the chest was negative for dissection or PE Dr. Steel was consulted     Rei Delatorre MD  02/07/24 3802

## 2024-02-08 NOTE — PROGRESS NOTES
"Daily progress note     Audrey Estrada is 63 y.o. female with history of anxiety depression, hyperlipidemia, CAD with 5 stents in the past, hypertension and prediabetes, presented with chest pain and near syncopal episode. Patient had covid recently 2 weeks back. negative troponin x 2, and has hypomagnesemia 1.49 and acute kidney injury 1.3 from normal baseline. CT chest angiogram was obtained due to elevated D-dimer shows no evidence of pulmonary embolism. There is no evidence of pneumonia however there is possible mass in the right upper quadrant incompletely visualized by CT scan.     Subjective    Patient was seen and examined at bedside  Patient reports having some palpitation and, an episode where she felt confused, some chest discomfort but no pain currently  No dizziness or lightheadedness    Vital signs in last 24 hours:  Temp:  [36 °C (96.8 °F)-37.1 °C (98.8 °F)] 36.7 °C (98.1 °F)  Heart Rate:  [62-74] 62  Resp:  [16-18] 16  BP: (141-208)/(67-87) 208/85  BP (!) 208/85   Pulse 62   Temp 36.7 °C (98.1 °F)   Resp 16   Ht 1.499 m (4' 11\")   Wt 75.2 kg (165 lb 12.6 oz)   SpO2 95%   BMI 33.48 kg/m²    Intake/Output last 3 shifts:  I/O last 3 completed shifts:  In: 742.5 (9.9 mL/kg) [I.V.:242.5 (3.2 mL/kg); IV Piggyback:500]  Out: - (0 mL/kg)   Weight: 75.2 kg   Intake/Output this shift:  No intake/output data recorded.    Physical Exam  Constitutional:       General: She is not in acute distress.     Appearance: Normal appearance. She is not ill-appearing, toxic-appearing or diaphoretic.   HENT:      Head: Normocephalic and atraumatic.      Mouth/Throat:      Mouth: Mucous membranes are moist.      Pharynx: No oropharyngeal exudate.   Eyes:      Extraocular Movements: Extraocular movements intact.      Pupils: Pupils are equal, round, and reactive to light.   Cardiovascular:      Rate and Rhythm: Normal rate and regular rhythm.      Heart sounds: No murmur heard.  Pulmonary:      Effort: Pulmonary effort " is normal. No respiratory distress.      Breath sounds: Normal breath sounds. No wheezing.   Abdominal:      General: Abdomen is flat. Bowel sounds are normal. There is no distension.      Tenderness: There is no abdominal tenderness. There is no guarding or rebound.   Musculoskeletal:         General: No swelling.      Right lower leg: No edema.      Left lower leg: No edema.   Skin:     Findings: No lesion or rash.   Neurological:      General: No focal deficit present.      Mental Status: She is alert and oriented to person, place, and time.      Cranial Nerves: No cranial nerve deficit.      Motor: No weakness.   Psychiatric:         Mood and Affect: Mood normal.         Behavior: Behavior normal.         Current medication   Current Facility-Administered Medications   Medication Dose Route Frequency Provider Last Rate Last Admin    acetaminophen (Tylenol) tablet 650 mg  650 mg oral q4h PRN Soco Steel MD        Or    acetaminophen (Tylenol) oral liquid 650 mg  650 mg nasogastric tube q4h PRN Soco Steel MD        Or    acetaminophen (Tylenol) suppository 650 mg  650 mg rectal q4h PRN Soco Steel MD        aspirin EC tablet 81 mg  81 mg oral Daily Soco Steel MD        atorvastatin (Lipitor) tablet 80 mg  80 mg oral Daily Soco Steel MD        carvedilol (Coreg) tablet 25 mg  25 mg oral BID Soco Steel MD        cholecalciferol (Vitamin D-3) tablet 1,000 Units  1,000 Units oral Daily Soco Steel MD        enoxaparin (Lovenox) syringe 40 mg  40 mg subcutaneous q24h Soco Steel MD   40 mg at 02/08/24 0114    FLUoxetine (PROzac) capsule 20 mg  20 mg oral Daily Soco Steel MD        lactated Ringer's infusion  50 mL/hr intravenous Continuous Soco Steel MD 50 mL/hr at 02/08/24 0109 50 mL/hr at 02/08/24 0109    losartan (Cozaar) tablet 100 mg  100 mg oral Daily Soco Steel MD        magnesium oxide (Mag-Ox) tablet 400 mg  400 mg oral Daily Soco Steel MD        melatonin tablet 6  mg  6 mg oral Nightly PRN Soco Steel MD   6 mg at 02/08/24 0403    ondansetron (Zofran) injection 4 mg  4 mg intravenous Once Flako Barbosa DO        ondansetron (Zofran) tablet 4 mg  4 mg oral q8h PRN Soco Steel MD        Or    ondansetron (Zofran) injection 4 mg  4 mg intravenous q8h PRN Soco Steel MD        pantoprazole (ProtoNix) EC tablet 40 mg  40 mg oral Daily Soco Steel MD        Or    pantoprazole (ProtoNix) injection 40 mg  40 mg intravenous Daily Soco Steel MD   40 mg at 02/08/24 0650    polyethylene glycol (Glycolax, Miralax) packet 17 g  17 g oral Daily PRN Soco Steel MD        potassium chloride CR (Klor-Con) ER tablet 10 mEq  10 mEq oral Daily Soco Steel MD        ticagrelor (Brilinta) tablet 90 mg  90 mg oral BID Soco Steel MD            Labs  Lab Results   Component Value Date    WBC 9.8 02/08/2024    HGB 8.7 (L) 02/08/2024    HCT 28.4 (L) 02/08/2024    MCV 81 02/08/2024     02/08/2024     Lab Results   Component Value Date    HGBA1C 6.2 (H) 10/01/2023     Lab Results   Component Value Date    GLUCOSE 99 02/08/2024    CALCIUM 9.2 02/08/2024     02/08/2024    K 3.8 02/08/2024    CO2 26 02/08/2024     02/08/2024    BUN 24 (H) 02/08/2024    CREATININE 1.01 02/08/2024           Principal Problem:    Chest pain, unspecified type  Active Problems:    HTN (hypertension)    Hyperlipidemia    CAD S/P percutaneous coronary angioplasty    Iron deficiency anemia    Acute kidney injury (CMS/HCC)    Right upper quadrant abdominal mass    Hypomagnesemia      Assessment and Plan   #Near syncopal episode, palpitation  #Atypical chest pain  #History of CAD with stents in the past      Continue telemetry monitoring  There was a questionable episode of A-fib but unclear at this moment  Continue monitoring for 24 hours  Continue on Coreg  Monitor electrolytes and replace  Cardiology is on board      #Uncontrolled blood pressure  Antihypertensives were adjusted  Monitor  blood pressure  Adjust antihypertensives accordingly    Hypomagnesemia  Replaced and corrected  Continue monitoring    #Anemia  Check B12 iron and folate and ferritin level  Continue monitoring BMP  No signs of bleeding noted    #DVT prophylaxis on Lovenox  Disposition likely discharge over the next 24 hours   LOS: 0 days

## 2024-02-08 NOTE — H&P
History Of Present Illness  Audrey Estrada is a 63 y.o. female presenting with chest pain and near syncope.  Patient reported was at work when she suddenly started to have palpitation, chest tightness, diaphoresis and nausea.  She denied fever.  She had recent COVID infection about 2 weeks ago.  She reported was not feeling back to normal since her last heart attack in October.  Patient does take aspirin and Brilinta.  She reported occasional nasal bleed.    ER course: Patient was awake, alert, oriented, in no acute distress.  Blood pressure was elevated up to 205/87.  She looked anxious.  Labs shows negative troponin x 2, and has hypomagnesemia 1.49 and acute kidney injury 1.3 from normal baseline.  Patient does have mild leukocytosis 11.7.  She has anemia hemoglobin 10.4 which is closer to baseline.  Flu test was negative.  CT chest angiogram was obtained due to elevated D-dimer shows no evidence of pulmonary embolism.  There is no evidence of pneumonia however there is possible mass in the right upper quadrant incompletely visualized by CT scan.  Recommendation is to get CT scan of the abdomen and pelvis for further assessment.       Past Medical History  Anxiety and depression  Hyperlipidemia  Coronary artery disease with 5 stents  Hypertension  Prediabetes    Surgical History  She has a past surgical history that includes US guided thyroid biopsy (12/13/2019); US aspiration injection intermediate joint (12/23/2019); Coronary angioplasty with stent; Cardiac catheterization (N/A, 10/2/2023); Cardiac catheterization (N/A, 10/2/2023); Cardiac catheterization (N/A, 10/1/2023); Cardiac catheterization (N/A, 10/1/2023); Cardiac catheterization (N/A, 10/6/2023); Cardiac catheterization (N/A, 10/6/2023); and Cardiac catheterization (N/A, 10/6/2023).     Social History  She reports that she quit smoking about 4 months ago. Her smoking use included cigarettes. She has a 15.00 pack-year smoking history. She has never used  smokeless tobacco. She reports that she does not drink alcohol and does not use drugs.    Family History  Family History   Problem Relation Name Age of Onset    Heart attack Father  65    Heart disease Paternal Grandmother  65        Allergies  Patient has no known allergies.    Review of Systems  10/10 points review of system were conducted, negative except as above    Physical Exam  Constitutional:       General: She is not in acute distress.     Appearance: She is obese. She is not ill-appearing, toxic-appearing or diaphoretic.   HENT:      Head: Normocephalic and atraumatic.      Nose: Nose normal.      Mouth/Throat:      Mouth: Mucous membranes are moist.   Eyes:      General: No scleral icterus.     Extraocular Movements: Extraocular movements intact.      Pupils: Pupils are equal, round, and reactive to light.   Cardiovascular:      Rate and Rhythm: Normal rate and regular rhythm.      Pulses: Normal pulses.      Heart sounds: Normal heart sounds. No murmur heard.     No friction rub. No gallop.   Pulmonary:      Effort: Pulmonary effort is normal. No respiratory distress.      Breath sounds: Normal breath sounds. No stridor. No wheezing, rhonchi or rales.   Chest:      Chest wall: No tenderness.   Abdominal:      General: There is no distension.      Palpations: Abdomen is soft. There is no mass.      Tenderness: There is no abdominal tenderness. There is no right CVA tenderness, left CVA tenderness, guarding or rebound.      Hernia: No hernia is present.   Musculoskeletal:         General: No swelling, tenderness, deformity or signs of injury. Normal range of motion.      Cervical back: Normal range of motion and neck supple. No rigidity or tenderness.      Right lower leg: No edema.      Left lower leg: No edema.   Skin:     General: Skin is warm and dry.      Findings: No lesion or rash.   Neurological:      General: No focal deficit present.      Mental Status: She is alert and oriented to person, place,  "and time. Mental status is at baseline.      Cranial Nerves: No cranial nerve deficit.      Sensory: No sensory deficit.      Motor: No weakness.      Coordination: Coordination normal.      Gait: Gait normal.      Deep Tendon Reflexes: Reflexes normal.   Psychiatric:         Mood and Affect: Mood normal.         Behavior: Behavior normal.          Last Recorded Vitals  Blood pressure 141/67, pulse 74, temperature 37.1 °C (98.8 °F), resp. rate 16, height 1.499 m (4' 11\"), weight 75.2 kg (165 lb 12.6 oz), SpO2 97 %.    Relevant Results  Scheduled medications  aspirin, 81 mg, oral, Daily  atorvastatin, 80 mg, oral, Daily  carvedilol, 25 mg, oral, BID  cholecalciferol, 1,000 Units, oral, Daily  enoxaparin, 40 mg, subcutaneous, q24h  FLUoxetine, 20 mg, oral, Daily  losartan, 100 mg, oral, Daily  magnesium oxide, 400 mg, oral, Daily  ondansetron, 4 mg, intravenous, Once  pantoprazole, 40 mg, oral, Daily   Or  pantoprazole, 40 mg, intravenous, Daily  potassium chloride CR, 10 mEq, oral, Daily  ticagrelor, 90 mg, oral, BID      Continuous medications  lactated Ringer's, 50 mL/hr, Last Rate: 50 mL/hr (02/08/24 0109)      PRN medications  PRN medications: acetaminophen **OR** acetaminophen **OR** acetaminophen, melatonin, ondansetron **OR** ondansetron, polyethylene glycol     Results for orders placed or performed during the hospital encounter of 02/07/24 (from the past 24 hour(s))   CBC and Auto Differential   Result Value Ref Range    WBC 11.7 (H) 4.4 - 11.3 x10*3/uL    nRBC 0.0 0.0 - 0.0 /100 WBCs    RBC 4.16 4.00 - 5.20 x10*6/uL    Hemoglobin 10.4 (L) 12.0 - 16.0 g/dL    Hematocrit 33.7 (L) 36.0 - 46.0 %    MCV 81 80 - 100 fL    MCH 25.0 (L) 26.0 - 34.0 pg    MCHC 30.9 (L) 32.0 - 36.0 g/dL    RDW 15.6 (H) 11.5 - 14.5 %    Platelets 290 150 - 450 x10*3/uL    Neutrophils % 81.2 40.0 - 80.0 %    Immature Granulocytes %, Automated 0.5 0.0 - 0.9 %    Lymphocytes % 8.6 13.0 - 44.0 %    Monocytes % 5.9 2.0 - 10.0 %    " Eosinophils % 3.2 0.0 - 6.0 %    Basophils % 0.6 0.0 - 2.0 %    Neutrophils Absolute 9.49 (H) 1.20 - 7.70 x10*3/uL    Immature Granulocytes Absolute, Automated 0.06 0.00 - 0.70 x10*3/uL    Lymphocytes Absolute 1.01 (L) 1.20 - 4.80 x10*3/uL    Monocytes Absolute 0.69 0.10 - 1.00 x10*3/uL    Eosinophils Absolute 0.38 0.00 - 0.70 x10*3/uL    Basophils Absolute 0.07 0.00 - 0.10 x10*3/uL   Comprehensive Metabolic Panel   Result Value Ref Range    Glucose 112 (H) 74 - 99 mg/dL    Sodium 138 136 - 145 mmol/L    Potassium 4.2 3.5 - 5.3 mmol/L    Chloride 102 98 - 107 mmol/L    Bicarbonate 26 21 - 32 mmol/L    Anion Gap 14 10 - 20 mmol/L    Urea Nitrogen 25 (H) 6 - 23 mg/dL    Creatinine 1.31 (H) 0.50 - 1.05 mg/dL    eGFR 45 (L) >60 mL/min/1.73m*2    Calcium 9.5 8.6 - 10.3 mg/dL    Albumin 4.1 3.4 - 5.0 g/dL    Alkaline Phosphatase 52 33 - 136 U/L    Total Protein 7.9 6.4 - 8.2 g/dL    AST 23 9 - 39 U/L    Bilirubin, Total 0.6 0.0 - 1.2 mg/dL    ALT 17 7 - 45 U/L   Magnesium   Result Value Ref Range    Magnesium 1.49 (L) 1.60 - 2.40 mg/dL   D-Dimer, Quantitative VTE Exclusion   Result Value Ref Range    D-Dimer, Quantitative VTE Exclusion 540 (H) <=500 ng/mL FEU   Lipase   Result Value Ref Range    Lipase 39 9 - 82 U/L   Protime-INR   Result Value Ref Range    Protime 12.7 9.8 - 12.8 seconds    INR 1.1 0.9 - 1.1   aPTT   Result Value Ref Range    aPTT 30 27 - 38 seconds   Troponin I, High Sensitivity, Initial   Result Value Ref Range    Troponin I, High Sensitivity <3 0 - 13 ng/L   Sars-CoV-2 and Influenza A/B PCR   Result Value Ref Range    Flu A Result Not Detected Not Detected    Flu B Result Not Detected Not Detected    Coronavirus 2019, PCR Not Detected Not Detected   Troponin, High Sensitivity, 1 Hour   Result Value Ref Range    Troponin I, High Sensitivity 3 0 - 13 ng/L       CT angio chest for pulmonary embolism    Result Date: 2/7/2024  Interpreted By:  Jori Guerrero, STUDY: CT ANGIO CHEST FOR PULMONARY EMBOLISM;   2/7/2024 10:13 pm   INDICATION: Signs/Symptoms:elevated d dimer, near syncope.   COMPARISON: CT chest 06/02/2021   ACCESSION NUMBER(S): [id number]   ORDERING CLINICIAN: ROME ALMEIDA   TECHNIQUE: Contiguous axial images of the chest were obtained after the intravenous administration of 75 mL Omnipaque 350 contrast using angiographic PE protocol. Coronal and sagittal reformatted images were reconstructed from the axial data. MIP images were created on an independent workstation and reviewed.   FINDINGS: PULMONARY ARTERIES: Adequate opacification to the level of the distal segmental arteries. The subsegmental arteries are suboptimally assessed due to mixing artifact and respiratory motion. No filling defect to suggest pulmonary embolus in the visualized pulmonary arteries. The main pulmonary artery is normal in diameter. No CT evidence of right heart strain.   HEART: Left ventricular enlargement. Severe triple-vessel coronary vascular calcifications. Calcifications in the plane of the aortic and mitral valves. No significant pericardial effusion.   VESSELS: Normal caliber aorta without dissection. No significant aortic atherosclerosis.   MEDIASTINUM AND LYMPH NODES: There appears to be a 1.6 cm fatty lesion within the right lobe of the thyroid, recommend nonemergent ultrasound for further assessment. No enlarged intrathoracic or axillary lymph nodes by imaging criteria. No pneumomediastinum. The esophagus appears within normal limits.   LUNG, AIRWAYS, AND PLEURA: Trachea and proximal mainstem bronchi are patent. No consolidation, pulmonary edema, pleural effusion or pneumothorax.   OSSEOUS STRUCTURES: No acute osseous abnormality.   CHEST WALL SOFT TISSUES: No discernible abnormality.   UPPER ABDOMEN/OTHER: Partially visualized lobulated soft tissue density in the upper right quadrant anterior to the pancreatic head.       1. No evidence of pulmonary embolism to the level of the segmental arteries. 2. No focal  airspace disease. 3. Possible mass in the right upper quadrant, incompletely visualized. Recommend CT of the abdomen and pelvis for further assessment.   MACRO: None   Signed by: Jori Guerrero 2/7/2024 10:39 PM Dictation workstation:   [id number]    CT head wo IV contrast    Result Date: 2/7/2024  Interpreted By:  Gerson Pelayo, STUDY: CT HEAD WO IV CONTRAST;  2/7/2024 8:52 pm   INDICATION: Signs/Symptoms:difficulty speaking, confusion.   COMPARISON: None.   ACCESSION NUMBER(S): [id number]   ORDERING CLINICIAN: ROME ALMEIDA   TECHNIQUE: Noncontrast axial CT scan of head was performed. Angled reformats in brain and bone windows were generated. The images were reviewed in bone, brain, blood and soft tissue windows.   FINDINGS: No hyperdense intracranial hemorrhage is identified. There is no mass effect or midline shift.   Gray-white differentiation is intact, without evidence of CT apparent transcortical infarct. Subtle attenuation changes present in the periventricular and subcortical white matter of bilateral cerebral hemispheres are nonspecific, but favored to represent sequela of microvascular disease.   No ventricular dilatation is evident. Basal cisterns are patent. No abnormal extra-axial fluid collections are identified.   Scalp soft tissues do not demonstrate any acute abnormality. Calvarium is unremarkable in appearance. Mastoid air cells and middle ear cavities are well aerated without evidence of fluid fluid levels.   Visualized paranasal sinuses are unremarkable in appearance.       1.  No evidence of hemorrhage, CT apparent transcortical infarct, or other acute intracranial abnormality. 2. Subtle attenuation changes present in the periventricular and subcortical white matter of bilateral cerebral hemispheres are nonspecific, though favored to represent sequela of microvascular disease.   MACRO: None   Signed by: Gerson Pelayo 2/7/2024 8:56 PM Dictation workstation:    [id number]    XR chest 1 view    Result Date: 2/7/2024  Interpreted By:  Pavel Veliz, STUDY: XR CHEST 1 VIEW;  2/7/2024 8:40 pm   INDICATION: Signs/Symptoms:Chest Pain.   COMPARISON: 11/03/2023   ACCESSION NUMBER(S): [id number]   ORDERING CLINICIAN: ROME ALMEIDA   FINDINGS:         Heart size is mildly enlarged.   Bronchial thickening   ABDOMEN: No remarkable upper abdominal findings.   BONES: No acute osseous changes.       1. Bronchial thickening. No focal infiltrate.       MACRO: None   Signed by: Pavel Veliz 2/7/2024 8:43 PM Dictation workstation:   [id number]          Assessment/Plan   Principal Problem:    Chest pain, unspecified type  Active Problems:    HTN (hypertension)    Hyperlipidemia    CAD S/P percutaneous coronary angioplasty    Iron deficiency anemia    Acute kidney injury (CMS/HCC)    Right upper quadrant abdominal mass    Hypomagnesemia    This patient presented with chest pain, near syncope, diaphoresis, nausea vomiting.  She had recent PCI with 5 stents.  Currently on Plavix and Brilinta reported compliant with medication.    Troponin is negative x 2.  She has mildly elevated D-dimer likely from recent COVID infection.  Patient does have anemia and mild ANGE.    -Trend another troponin.  -Cardiology consult for further management.  -Keep n.p.o. after midnight for possible stress test versus cardiac cath.  -Continue with aspirin and Brilinta for now.  -Continuous telemonitoring.  -Monitor serum electrolyte and correct as indicated.  -Magnesium was replaced in the ER.  Will give another 1 g of magnesium and will check magnesium level in the morning  -Continue gentle IV fluid hydration for ANGE.  -Resume atorvastatin    Patient does have incidental finding of mass in the right upper quadrant.  Due to acute kidney injury, will hold off another CT scan with IV contrast for now, and once her kidney function improves then we will obtain CT scan otherwise we will obtain MRI of the  abdomen.      Soco Steel MD

## 2024-02-08 NOTE — PROGRESS NOTES
02/08/24 1501   Discharge Planning   Living Arrangements Spouse/significant other   Support Systems Spouse/significant other   Assistance Needed pt is independet in [place]ds. drives, no AD use, no recent falls.   Type of Residence Private residence   Who is requesting discharge planning? Provider   Home or Post Acute Services None   Patient expects to be discharged to: home     Met w/ pt. Confirmed demo's ins and pcp. Pt is independent. Drives, denies medication barrier. Prefers home discharge. Pt is hoping to discharge today. Denies any needs.

## 2024-02-08 NOTE — CONSULTS
Inpatient consult to Cardiology  Consult performed by: YULIA Strauss-CNP  Consult ordered by: Soco Steel MD  Reason for consult: cp      Cardiology Consult Note      Date:   2/8/2024  Patient name:  Audrey Estrada  Date of admission:  2/7/2024  7:56 PM  MRN:   [id number]  YOB: 1960  Time of Consult:  10:19 AM    Consulting Cardiologist: YULIA Watts, CNP  Primary Cardiologist:   Dr Perry     Referring Provider: Dr Treviño    FULL CARDIOLOGIST CONSULT END OF DOCUMENT    Admission Diagnosis:     Chest pain, unspecified type      History of Present Illness:      Audrey Estrada is a 63 y.o.  female patient who is being at the request of Dr. Treviño for inpatient consultation of angina. She was admitted on 2/7/2024.  Previous [facility] and [facility] records have been reviewed in detail.    With a history of CAD, hypertension, dyslipidemia came in yesterday into the emergency department after having an episode of palpitations, shortness of breath, vomiting with some dull chest pain.  She states that she was at work she started to feel little clammy she felt some palpitations she did not feel right so she went into the bathroom had a large emesis came back she felt like her hands were very diaphoretic but she kept feeling these heart palpitations so they called 911 brought into the emergency department.  Positive for chest pain, diaphoresis, nausea.  She is chest pain-free at this time she states she is feeling much better now.  She did tell me that approximately 1 week ago she just got over COVID-19.  EKG is normal sinus rhythm no acute ST elevations or depressions appreciated  Troponins negative        Cardiology  10/1/23   1. Left ventricular systolic function is normal with a 60% estimated ejection fraction.   2. Spectral Doppler shows an impaired relaxation pattern of left ventricular diastolic filling.   3. There is evidence of mild mitral valve stenosis.   4. There  is moderate mitral annular calcification.   5. Mild tricuspid regurgitation is visualized.   6. Mild aortic valve stenosis.   7. The main pulmonary artery is normal in size, and position, with normal bifurcation into the left and right pulmonary arteries.  10/3/23  1. IVUS guided PCI to mid to proximal RCA stenosis.     ICD 10 Codes:  ST elevation (STEMI) myocardial infarction involving left circumflex coronary artery-I21.21   oronary Angiography:  The coronary circulation is co-dominant.     Left Main Coronary Artery:  The left main coronary artery is a normal caliber vessel. The left main arises normally from the left coronary sinus of Valsalva. The left main coronary artery showed a normal vessel.     Left Anterior Descending Coronary Artery Distribution:  The left anterior descending coronary artery is a normal caliber vessel. The LAD arises normally from the left main coronary artery. The LAD demonstrated diffuse mild atherosclerotic disease.     Circumflex Coronary Artery Distribution:  The circumflex coronary artery is a normal caliber vessel. The circumflex arises normally from the left main coronary artery. The circumflex revealed 2 previous patent stents.     Right Coronary Artery Distribution:     The right coronary artery is a normal caliber vessel. The RCA arises normally from the right sinus of Valsalva. The RCA showed multiple previous patent stents.        Left Ventriculography:  Left ventricular angiography was not performed. LVEDP of 11mmHg.       Allergies:     No Known Allergies      Past Medical History:     Past Medical History:   Diagnosis Date    Anxiety     Hyperlipidemia        Past Surgical History:     Past Surgical History:   Procedure Laterality Date    CARDIAC CATHETERIZATION N/A 10/2/2023    Procedure: PCI CARLOS ALBERTO Stent- Coronary;  Surgeon: Nish Parnell MD;  Location: [facility];  Service: Cardiovascular;  Laterality: N/A;  RCA PCI. Please schedule to follow my  2nd case of the afternoon    CARDIAC CATHETERIZATION N/A 10/2/2023    Procedure: IVUS - Coronary;  Surgeon: Nish Parnell MD;  Location: [facility];  Service: Cardiovascular;  Laterality: N/A;    CARDIAC CATHETERIZATION N/A 10/1/2023    Procedure: Left Heart Cath, No LV;  Surgeon: Nish Parnell MD;  Location: [facility];  Service: Cardiovascular;  Laterality: N/A;    CARDIAC CATHETERIZATION N/A 10/1/2023    Procedure: PCI;  Surgeon: Nish Parnell MD;  Location: [facility];  Service: Cardiovascular;  Laterality: N/A;    CARDIAC CATHETERIZATION N/A 10/6/2023    Procedure: Left Heart Cath;  Surgeon: Nish Parnell MD;  Location: [facility];  Service: Cardiovascular;  Laterality: N/A;    CARDIAC CATHETERIZATION N/A 10/6/2023    Procedure: PCI CARLOS ALBERTO Stent- Coronary;  Surgeon: Nish Parnell MD;  Location: [facility];  Service: Cardiovascular;  Laterality: N/A;  RCA PCI with Shockwave. Please schedule at 9 am if possible    CARDIAC CATHETERIZATION N/A 10/6/2023    Procedure: IVUS - Coronary;  Surgeon: Nish Parnell MD;  Location: [facility];  Service: Cardiovascular;  Laterality: N/A;    CORONARY ANGIOPLASTY WITH STENT PLACEMENT      US ASPIRATION INJECTION INTERMEDIATE JOINT  2019    US ASPIRATION INJECTION INTERMEDIATE JOINT 2019 [facility]    US GUIDED THYROID BIOPSY  2019    US GUIDED THYROID BIOPSY 2019 [facility]       Family History:     Family History   Problem Relation Name Age of Onset    Heart attack Father  65    Heart disease Paternal Grandmother  65       Social History:     Social History     Tobacco Use    Smoking status: Former     Packs/day: 1.00     Years: 15.00     Additional pack years: 0.00     Total pack years: 15.00     Types: Cigarettes     Quit date: 2023     Years since quittin.3    Smokeless tobacco:  Never   Vaping Use    Vaping Use: Never used   Substance Use Topics    Alcohol use: Never    Drug use: Never       CURRENT INPATIENT MEDICATIONS    aspirin, 81 mg, oral, Daily  atorvastatin, 80 mg, oral, Daily  carvedilol, 25 mg, oral, BID  cholecalciferol, 1,000 Units, oral, Daily  enoxaparin, 40 mg, subcutaneous, q24h  FLUoxetine, 20 mg, oral, Daily  losartan, 100 mg, oral, Daily  magnesium oxide, 400 mg, oral, Daily  ondansetron, 4 mg, intravenous, Once  pantoprazole, 40 mg, oral, Daily   Or  pantoprazole, 40 mg, intravenous, Daily  potassium chloride CR, 10 mEq, oral, Daily  ticagrelor, 90 mg, oral, BID      lactated Ringer's, 50 mL/hr, Last Rate: 50 mL/hr (02/08/24 0109)      Current Outpatient Medications   Medication Instructions    aspirin 81 mg, oral, Daily    atorvastatin (LIPITOR) 80 mg, oral, Daily    carvedilol (COREG) 25 mg, oral, 2 times daily    cholecalciferol (VITAMIN D-3) 25 mcg, oral, Daily    FLUoxetine (PROZAC) 20 mg, oral, Daily    losartan (COZAAR) 100 mg, oral, Daily    magnesium oxide (MAGOX) 400 mg, oral, Daily    omega-3 acid ethyl esters (Lovaza) 1 gram capsule TAKE 1 CAPSULE BY MOUTH TWICE A DAY    omeprazole OTC (PRILOSEC OTC) 1 mg, oral, Daily before breakfast, Do not crush, chew, or split.    potassium chloride CR (Klor-Con) 10 mEq ER tablet 10 mEq, oral, Daily, Do not crush, chew, or split.    ticagrelor (BRILINTA) 90 mg, oral, 2 times daily        Review of Systems:      12 point review of systems was obtained in detail and is negative other than that detailed above.    Vital Signs:     Vitals:    02/07/24 2352 02/08/24 0010 02/08/24 0414 02/08/24 0741   BP: (!) 184/76 141/67 179/77 (!) 208/85   BP Location:    Right arm   Patient Position:    Lying   Pulse: 70 74 62 62   Resp: 18 16 16 16   Temp:  37.1 °C (98.8 °F) 36.4 °C (97.5 °F) 36.7 °C (98.1 °F)   TempSrc:    Temporal   SpO2: 98% 97% 97% 95%   Weight:  75.2 kg (165 lb 12.6 oz)     Height:           Intake/Output Summary (Last  24 hours) at 2/8/2024 1019  Last data filed at 2/8/2024 0500  Gross per 24 hour   Intake 742.5 ml   Output --   Net 742.5 ml       Wt Readings from Last 4 Encounters:   02/08/24 75.2 kg (165 lb 12.6 oz)   01/22/24 72.8 kg (160 lb 6.4 oz)   11/30/23 73.9 kg (163 lb)   11/09/23 71.7 kg (158 lb)       Physical Examination:     GENERAL APPEARANCE: Well developed, well nourished, in no acute distress.  CHEST: Symmetric and non-tender.  INTEGUMENT: Skin warm and dry, without gross excoriationis or lesions.  HEENT: No gross abnormalities of conjunctiva, teeth, gums, oral mucosa  NECK: Supple, no JVD, no bruit. Thyroid not palpable. Carotid upstrokes normal.  NEURO/PSHCY: Alert and oriented x3; appropriate behavior and responses and responses, grossly normal cerebellar function with normal balance and coordination  LUNGS: Clear to auscultation bilaterally; normal respiratory effort.  HEART: Rate and rhythm regular with no evident murmur; no gallop appreciated. There are no rubs, clicks or heaves. PMI nondisplaced.  ABDOMEN: Soft, nontender, no palpable hepatosplenomegaly, no mases, no bruits. Abdominal aorta not noted to be enlarged.  MUSCULOSKELETAL: Ambulatory with normal tandem gait.  EXTREMITIES: Warm with good color, no clubbing or cyanois. There is no edema noted.  PERIPHERAL VASCULAR: Pulses present and equally palpable; 2+ throughout. No femoral bruits.      Lab:     CBC:   Results from last 7 days   Lab Units 02/08/24  0643 02/07/24 2019   WBC AUTO x10*3/uL 9.8 11.7*   RBC AUTO x10*6/uL 3.53* 4.16   HEMOGLOBIN g/dL 8.7* 10.4*   HEMATOCRIT % 28.4* 33.7*   MCV fL 81 81   MCH pg 24.6* 25.0*   MCHC g/dL 30.6* 30.9*   RDW % 15.7* 15.6*   PLATELETS AUTO x10*3/uL 204 290     CMP:    Results from last 7 days   Lab Units 02/08/24  0643 02/07/24 2019   SODIUM mmol/L 138 138   POTASSIUM mmol/L 3.8 4.2   CHLORIDE mmol/L 103 102   CO2 mmol/L 26 26   BUN mg/dL 24* 25*   CREATININE mg/dL 1.01 1.31*   GLUCOSE mg/dL 99 112*    PROTEIN TOTAL g/dL 7.0 7.9   CALCIUM mg/dL 9.2 9.5   BILIRUBIN TOTAL mg/dL 0.3 0.6   ALK PHOS U/L 45 52   AST U/L 17 23   ALT U/L 15 17     BMP:    Results from last 7 days   Lab Units 02/08/24  0643 02/07/24 2019   SODIUM mmol/L 138 138   POTASSIUM mmol/L 3.8 4.2   CHLORIDE mmol/L 103 102   CO2 mmol/L 26 26   BUN mg/dL 24* 25*   CREATININE mg/dL 1.01 1.31*   CALCIUM mg/dL 9.2 9.5   GLUCOSE mg/dL 99 112*     Magnesium:  Results from last 7 days   Lab Units 02/08/24  0643 02/07/24 2019   MAGNESIUM mg/dL 2.08 1.49*     Troponin:    Results from last 7 days   Lab Units 02/08/24  0643 02/07/24 2137 02/07/24 2019   TROPHS ng/L 3 3 <3     BNP:     Lipid Panel:         Diagnostic Studies:   @No results found for this or any previous visit.    ECG 12 lead    Result Date: 2/8/2024  Normal sinus rhythm Normal ECG When compared with ECG of 03-NOV-2023 11:58, Previous ECG has undetermined rhythm, needs review    CT angio chest for pulmonary embolism    Result Date: 2/7/2024  Interpreted By:  Jori Guerrero, STUDY: CT ANGIO CHEST FOR PULMONARY EMBOLISM;  2/7/2024 10:13 pm   INDICATION: Signs/Symptoms:elevated d dimer, near syncope.   COMPARISON: CT chest 06/02/2021   ACCESSION NUMBER(S): [id number]   ORDERING CLINICIAN: ROME ALMEIDA   TECHNIQUE: Contiguous axial images of the chest were obtained after the intravenous administration of 75 mL Omnipaque 350 contrast using angiographic PE protocol. Coronal and sagittal reformatted images were reconstructed from the axial data. MIP images were created on an independent workstation and reviewed.   FINDINGS: PULMONARY ARTERIES: Adequate opacification to the level of the distal segmental arteries. The subsegmental arteries are suboptimally assessed due to mixing artifact and respiratory motion. No filling defect to suggest pulmonary embolus in the visualized pulmonary arteries. The main pulmonary artery is normal in diameter. No CT evidence of right heart strain.   HEART: Left  ventricular enlargement. Severe triple-vessel coronary vascular calcifications. Calcifications in the plane of the aortic and mitral valves. No significant pericardial effusion.   VESSELS: Normal caliber aorta without dissection. No significant aortic atherosclerosis.   MEDIASTINUM AND LYMPH NODES: There appears to be a 1.6 cm fatty lesion within the right lobe of the thyroid, recommend nonemergent ultrasound for further assessment. No enlarged intrathoracic or axillary lymph nodes by imaging criteria. No pneumomediastinum. The esophagus appears within normal limits.   LUNG, AIRWAYS, AND PLEURA: Trachea and proximal mainstem bronchi are patent. No consolidation, pulmonary edema, pleural effusion or pneumothorax.   OSSEOUS STRUCTURES: No acute osseous abnormality.   CHEST WALL SOFT TISSUES: No discernible abnormality.   UPPER ABDOMEN/OTHER: Partially visualized lobulated soft tissue density in the upper right quadrant anterior to the pancreatic head.       1. No evidence of pulmonary embolism to the level of the segmental arteries. 2. No focal airspace disease. 3. Possible mass in the right upper quadrant, incompletely visualized. Recommend CT of the abdomen and pelvis for further assessment.   MACRO: None   Signed by: Jori Guerrero 2/7/2024 10:39 PM Dictation workstation:   [id number]    CT head wo IV contrast    Result Date: 2/7/2024  Interpreted By:  Gerson Pelayo, STUDY: CT HEAD WO IV CONTRAST;  2/7/2024 8:52 pm   INDICATION: Signs/Symptoms:difficulty speaking, confusion.   COMPARISON: None.   ACCESSION NUMBER(S): [id number]   ORDERING CLINICIAN: ROME ALMEIDA   TECHNIQUE: Noncontrast axial CT scan of head was performed. Angled reformats in brain and bone windows were generated. The images were reviewed in bone, brain, blood and soft tissue windows.   FINDINGS: No hyperdense intracranial hemorrhage is identified. There is no mass effect or midline shift.   Gray-white differentiation is intact, without  evidence of CT apparent transcortical infarct. Subtle attenuation changes present in the periventricular and subcortical white matter of bilateral cerebral hemispheres are nonspecific, but favored to represent sequela of microvascular disease.   No ventricular dilatation is evident. Basal cisterns are patent. No abnormal extra-axial fluid collections are identified.   Scalp soft tissues do not demonstrate any acute abnormality. Calvarium is unremarkable in appearance. Mastoid air cells and middle ear cavities are well aerated without evidence of fluid fluid levels.   Visualized paranasal sinuses are unremarkable in appearance.       1.  No evidence of hemorrhage, CT apparent transcortical infarct, or other acute intracranial abnormality. 2. Subtle attenuation changes present in the periventricular and subcortical white matter of bilateral cerebral hemispheres are nonspecific, though favored to represent sequela of microvascular disease.   MACRO: None   Signed by: Gerson Pelayo 2/7/2024 8:56 PM Dictation workstation:   [id number]    XR chest 1 view    Result Date: 2/7/2024  Interpreted By:  Pavel Veliz, STUDY: XR CHEST 1 VIEW;  2/7/2024 8:40 pm   INDICATION: Signs/Symptoms:Chest Pain.   COMPARISON: 11/03/2023   ACCESSION NUMBER(S): [id number]   ORDERING CLINICIAN: ROME ALMEIDA   FINDINGS:         Heart size is mildly enlarged.   Bronchial thickening   ABDOMEN: No remarkable upper abdominal findings.   BONES: No acute osseous changes.       1. Bronchial thickening. No focal infiltrate.       MACRO: None   Signed by: Pavel Veliz 2/7/2024 8:43 PM Dictation workstation:   [id number]      No echocardiogram results found for the past 14 days    Radiology:     CT angio chest for pulmonary embolism   Final Result   1. No evidence of pulmonary embolism to the level of the segmental   arteries.   2. No focal airspace disease.   3. Possible mass in the right upper quadrant, incompletely   visualized.  Recommend CT of the abdomen and pelvis for further   assessment.        MACRO:   None        Signed by: Jori Guerrero 2/7/2024 10:39 PM   Dictation workstation:   [id number]      CT head wo IV contrast   Final Result   1.  No evidence of hemorrhage, CT apparent transcortical infarct, or   other acute intracranial abnormality.   2. Subtle attenuation changes present in the periventricular and   subcortical white matter of bilateral cerebral hemispheres are   nonspecific, though favored to represent sequela of microvascular   disease.        MACRO:   None        Signed by: Gerson Pelayo 2/7/2024 8:56 PM   Dictation workstation:   [id number]      XR chest 1 view   Final Result   1. Bronchial thickening. No focal infiltrate.                  MACRO:   None        Signed by: Pavel Veliz 2/7/2024 8:43 PM   Dictation workstation:   [id number]      CT abdomen pelvis wo IV contrast    (Results Pending)       Problem List:     Patient Active Problem List   Diagnosis    HTN (hypertension)    Gastroesophageal reflux disease    Hyperlipidemia    NAFLD (nonalcoholic fatty liver disease)    Thyroid nodule    Coronary artery disease involving native coronary artery of native heart with unstable angina pectoris (CMS/HCC)    Anxiety    Anemia    BMI 32.0-32.9,adult    Former cigarette smoker    Stage 3a chronic kidney disease (CMS/HCC)    Epistaxis    CAD S/P percutaneous coronary angioplasty    STEMI involving oth coronary artery of inferior wall (CMS/HCC)    Bilateral carotid artery stenosis    Chest pain, unspecified type    Iron deficiency anemia    Acute kidney injury (CMS/HCC)    Right upper quadrant abdominal mass    Hypomagnesemia       Assessment:   Chest Pain rule out ACS  Palpitations  Hypertension  Dyslipidemia  EF normal      Plan:   Tele monitoring  Serial enzymes negative  Daily EKG  Will discuss possible Zio patch as an outpatient  Add Norvasc 5 mg daily    Further recommendations per   Yas Silvestre CNP  [facility]    2/8/2024 CARDIOLOGIST CONSULT  I have personally seen and examined patient as well as personally formulated treatment plan.  I have reviewed this note as created by YULIA Celaya CNP and agree with his findings and physical exam.    63-year-old woman with acute febrile myocardial infarction October 2023 presents with an episode of chest fullness but the primary issue was that her heart felt irregular and skipping.  She suddenly felt associated nausea.  Also was somewhat confused difficult communicating had severe vomiting and then the symptoms resolved.  She says since her infarct she has noted her heart to be skipping and racing on occasion.  She has not had anterior precordial pressure or tightness.  Her chest just felt funny.  She did not feel like she did when she had the heart attack.  She did not have syncope but felt very weak at the time of the event yesterday.    Exam: Comfortable though somewhat anxious.  Lungs are clear cardiac exam is a regular rhythm and rate without murmur abdomen is obese soft nontender extremities show no sinus clubbing or edema    ECG: Personally reviewed normal study    Pertinent laboratory studies presents with low magnesium anemia.  Troponins normal    Telemetry: Telemetry is problematic.  Hardwired monitor on the unit shows her to remain in sinus rhythm throughout.  However remote telemetry purportedly connected to the same monitor showed at 5/28/1950 an episode of atrial fibrillation rate 150.  I have discussed with the nursing supervisor this discrepancy and this issue is currently being evaluated.    Impression  1.  Near syncope/palpitation: Her symptoms are very suggestive of dysrhythmia.  Unfortunately monitoring has 2 recorded episodes at the exact same time 1 sinus rhythm 1 atrial fibrillation.  And that she almost passed out with this sensation she needs to be  monitored here in the hospital 1 more day and assure her electrolytes remained stable.  Continue on carvedilol for now.  She is on maximal dose.  In that she has easy bruising and is developing anemia would not fully anticoagulate until she clearly has evidence of atrial fibrillation.  Mildly volume depleted as well on presentation and magnesium was low this needs to be supplemented as well.  2.  Coronary artery disease: No angina troponins negative her chest discomfort is more suggestive of dysrhythmia as opposed to angina.  ECG shows no acute changes continue aspirin and Brilinta for now and that she is 3 months since angioplasty could consider going to single agent but I am concerned given her presentation with acute infarct and would prefer 6 months dual antiplatelet therapy  3.  Potential dysrhythmia would monitor another 24 hours  4.  Anemia: Now just below 9 was just over 9 in November continue to monitor this and service following no obvious blood loss at this point    Flash Sorenson MD        Of note, this documentation is completed using the Dragon Dictation system (voice recognition software). There may be spelling and/or grammatical errors that were not corrected prior to final submission.      Electronically signed by YULIA Strauss-JENNIFER, on 2/8/2024 at 10:19 AM

## 2024-02-09 ENCOUNTER — PATIENT OUTREACH (OUTPATIENT)
Dept: CARDIOLOGY | Facility: CLINIC | Age: 64
End: 2024-02-09
Payer: COMMERCIAL

## 2024-02-09 ENCOUNTER — DOCUMENTATION (OUTPATIENT)
Dept: CARDIOLOGY | Facility: CLINIC | Age: 64
End: 2024-02-09
Payer: COMMERCIAL

## 2024-02-09 NOTE — DISCHARGE SUMMARY
Discharge Diagnosis  1. Chest pain, unspecified type    2. Altered mental status, unspecified altered mental status type    3. Hypomagnesemia       Issues Requiring Follow-Up  Follow up with cardiology     Discharge Meds     Your medication list        CONTINUE taking these medications        Instructions Last Dose Given Next Dose Due   aspirin 81 mg EC tablet      Take 1 tablet (81 mg) by mouth once daily.       atorvastatin 80 mg tablet  Commonly known as: Lipitor      Take 1 tablet (80 mg) by mouth once daily.       carvedilol 25 mg tablet  Commonly known as: Coreg      Take 1 tablet (25 mg) by mouth 2 times a day.       cholecalciferol 25 MCG (1000 UT) capsule  Commonly known as: Vitamin D-3           FLUoxetine 20 mg capsule  Commonly known as: PROzac      TAKE 1 CAPSULE BY MOUTH DAILY       losartan 100 mg tablet  Commonly known as: Cozaar      Take 1 tablet (100 mg) by mouth once daily.       magnesium oxide 400 mg (241.3 mg magnesium) tablet  Commonly known as: MagOx      Take 1 tablet (400 mg) by mouth once daily.       omega-3 acid ethyl esters 1 gram capsule  Commonly known as: Lovaza      TAKE 1 CAPSULE BY MOUTH TWICE A DAY       omeprazole OTC 20 mg EC tablet  Commonly known as: PriLOSEC OTC           potassium chloride CR 10 mEq ER tablet  Commonly known as: Klor-Con      Take 1 tablet (10 mEq) by mouth once daily. Do not crush, chew, or split.       ticagrelor 90 mg tablet  Commonly known as: Brilinta      Take 1 tablet (90 mg) by mouth 2 times a day.                Test Results Pending At Discharge  Pending Labs       No current pending labs.            Hospital Course    Audrey Estrada is 63 y.o. female with history of anxiety depression, hyperlipidemia, CAD with 5 stents in the past, hypertension and prediabetes, presented with chest pain and near syncopal episode. Patient had covid recently 2 weeks back. negative troponin x 2, and has hypomagnesemia 1.49 and acute kidney injury 1.3 from normal  baseline. CT chest angiogram was obtained due to elevated D-dimer shows no evidence of pulmonary embolism. There is no evidence of pneumonia however there is possible mass in the right upper quadrant incompletely visualized by CT scan.  CT abdomen pelvis was done showed that the possible mass seen on the CT scan of the chest was only decompressed bowel but otherwise no significant finding except cholelithiasis.  Electrolytes were replaced and corrected.  Patient noted to have elevated blood pressure and medications optimized.  Patient was seen by cardiology.  Cardiology recommended monitoring overnight for arrhythmias as well as optimization of blood pressure control.  Patient left overnight even though was recommended to stay overnight to be monitored and cardiology did not clear yet.   Pertinent Physical Exam At Time of Discharge  Physical Exam  Patient was seen during the day and plan was to keep overnight for monitoring but patient left overnight, did not examine the patient at the time of leaving the hospital  Outpatient Follow-Up  Future Appointments   Date Time Provider Department Center   2/29/2024  1:00 PM Cam Araya MD [id number] [place]   7/22/2024 10:45 AM Nish Parnell MD [id number] [place]         Soco Steel MD

## 2024-02-09 NOTE — NURSING NOTE
"Patient requested to speak with a provider regarding discharge. She stated \"I don't know why I am being kept since the heart rhythm that was recorded earlier, wasn't me it was someone else's rhythm\". Patient expressed that she would not like to pay for another day in the hospital, and would rather go home and sleep in her own bed. Dr. Steel notified and came to bedside to speak with patient. Patient was given education regarding the risks of discharging too soon and the benefits of staying. Patient made the decision to be discharged tonight, and will contact her cardiologist in the morning.       Patient called once home and reported that she did not receive a prescription for the Norvasc that she was started on inpatient. She was notified to report to her cardiologist in the morning that she was started on this medication but was not given a prescription upon discharge and to see what they would like to do regarding the medication.     "

## 2024-02-09 NOTE — PROGRESS NOTES
This patient called this CM stating she did not have her Norvasc. This CM reached out to discharging MD regarding this medications and updated this CM that she had signed out AMA and this medication could not be prescribed. Patient did reach out to PCP but office closed at 1230pm. Patient has a follow up with cardiology on 2/19/24 but [place]. This CM reviews being evaluated in ER

## 2024-02-09 NOTE — PROGRESS NOTES
Discharge Facility:  [facility]  Discharge Diagnosis: CP  Admission Date:  2/7/24  Discharge Date: 2/8/24    PCP Appointment Date: Dr Araya 2/29/24  Specialist Appointment Date: Sending a task to Dr Mendes's office  Hospital Encounter and Summary: Linked     2 Calls with messages left and no return calls as of this note

## 2024-02-11 LAB
ATRIAL RATE: 59 BPM
P AXIS: 54 DEGREES
P OFFSET: 195 MS
P ONSET: 147 MS
PR INTERVAL: 134 MS
Q ONSET: 214 MS
QRS COUNT: 9 BEATS
QRS DURATION: 92 MS
QT INTERVAL: 468 MS
QTC CALCULATION(BAZETT): 463 MS
QTC FREDERICIA: 465 MS
R AXIS: -27 DEGREES
T AXIS: 38 DEGREES
T OFFSET: 448 MS
VENTRICULAR RATE: 59 BPM

## 2024-02-12 ENCOUNTER — OFFICE VISIT (OUTPATIENT)
Dept: PRIMARY CARE | Facility: CLINIC | Age: 64
End: 2024-02-12
Payer: COMMERCIAL

## 2024-02-12 ENCOUNTER — APPOINTMENT (OUTPATIENT)
Dept: RADIOLOGY | Facility: HOSPITAL | Age: 64
End: 2024-02-12
Payer: COMMERCIAL

## 2024-02-12 VITALS
DIASTOLIC BLOOD PRESSURE: 80 MMHG | SYSTOLIC BLOOD PRESSURE: 132 MMHG | BODY MASS INDEX: 28.35 KG/M2 | HEART RATE: 66 BPM | TEMPERATURE: 97.3 F | WEIGHT: 160 LBS | HEIGHT: 63 IN

## 2024-02-12 DIAGNOSIS — F41.1 GAD (GENERALIZED ANXIETY DISORDER): ICD-10-CM

## 2024-02-12 DIAGNOSIS — N18.31 STAGE 3A CHRONIC KIDNEY DISEASE (MULTI): ICD-10-CM

## 2024-02-12 DIAGNOSIS — I10 PRIMARY HYPERTENSION: Primary | ICD-10-CM

## 2024-02-12 PROCEDURE — 3079F DIAST BP 80-89 MM HG: CPT | Performed by: INTERNAL MEDICINE

## 2024-02-12 PROCEDURE — 1111F DSCHRG MED/CURRENT MED MERGE: CPT | Performed by: INTERNAL MEDICINE

## 2024-02-12 PROCEDURE — 1160F RVW MEDS BY RX/DR IN RCRD: CPT | Performed by: INTERNAL MEDICINE

## 2024-02-12 PROCEDURE — 1159F MED LIST DOCD IN RCRD: CPT | Performed by: INTERNAL MEDICINE

## 2024-02-12 PROCEDURE — 3008F BODY MASS INDEX DOCD: CPT | Performed by: INTERNAL MEDICINE

## 2024-02-12 PROCEDURE — 3075F SYST BP GE 130 - 139MM HG: CPT | Performed by: INTERNAL MEDICINE

## 2024-02-12 PROCEDURE — 1036F TOBACCO NON-USER: CPT | Performed by: INTERNAL MEDICINE

## 2024-02-12 PROCEDURE — 1125F AMNT PAIN NOTED PAIN PRSNT: CPT | Performed by: INTERNAL MEDICINE

## 2024-02-12 PROCEDURE — 99496 TRANSJ CARE MGMT HIGH F2F 7D: CPT | Performed by: INTERNAL MEDICINE

## 2024-02-12 RX ORDER — AMLODIPINE BESYLATE 5 MG/1
TABLET ORAL
Qty: 30 TABLET | Refills: 5 | Status: SHIPPED | OUTPATIENT
Start: 2024-02-12

## 2024-02-12 RX ORDER — ESCITALOPRAM OXALATE 10 MG/1
10 TABLET ORAL DAILY
Qty: 30 TABLET | Refills: 5 | Status: SHIPPED | OUTPATIENT
Start: 2024-02-12 | End: 2024-05-16

## 2024-02-12 RX ORDER — ESCITALOPRAM OXALATE 20 MG/1
20 TABLET ORAL DAILY
Qty: 30 TABLET | Refills: 5 | Status: SHIPPED | OUTPATIENT
Start: 2024-02-12 | End: 2024-02-12 | Stop reason: SDUPTHER

## 2024-02-12 ASSESSMENT — ENCOUNTER SYMPTOMS
FATIGUE: 0
MUSCULOSKELETAL NEGATIVE: 1
NEUROLOGICAL NEGATIVE: 1
NERVOUS/ANXIOUS: 1
CARDIOVASCULAR NEGATIVE: 1
RESPIRATORY NEGATIVE: 1
CONSTITUTIONAL NEGATIVE: 1
GASTROINTESTINAL NEGATIVE: 1

## 2024-02-12 NOTE — PROGRESS NOTES
"Patient: Audrey Estrada  : 1960  PCP: Cam Araya MD  MRN: [id number]  Program: No linked episodes     Audrey Estrada is a 63 y.o. female presenting today for follow-up after being discharged from the hospital 3 days ago. The main problem requiring admission was accelerated HTN. The discharge summary and/or Transitional Care Management documentation was reviewed. Medication reconciliation was performed as indicated via the \"Miguel as Reviewed\" timestamp.   Patient was hospitalized because of accelerated hypertension, she stayed in the hospital for 2 nights, her magnesium was low, blood pressure came down, patient was quite anxious and apprehensive about it, she just have a myocardial infarction few months ago.  On top of that she continued to have a bruising from Brilinta treatment.  She was discharged and now she came year for follow-up because of these accelerated hypertension and further recommendations.  Audrey Estrada was contacted by Transitional Care Management services two days after her discharge. This encounter and supporting documentation was reviewed.    The complexity of medical decision making for this patient's transitional care is high.    Physical Exam  Constitutional:       Appearance: Normal appearance. She is normal weight.   HENT:      Head: Normocephalic.   Eyes:      Conjunctiva/sclera: Conjunctivae normal.   Cardiovascular:      Rate and Rhythm: Normal rate and regular rhythm.      Pulses: Normal pulses.      Heart sounds: Normal heart sounds.   Pulmonary:      Effort: Pulmonary effort is normal.      Breath sounds: Normal breath sounds.   Abdominal:      General: Abdomen is flat.      Palpations: Abdomen is soft.   Musculoskeletal:         General: No tenderness.      Cervical back: Neck supple.   Skin:     General: Skin is warm and dry.   Neurological:      General: No focal deficit present.      Mental Status: She is oriented to person, place, and time. Mental status is at baseline. "   Psychiatric:         Mood and Affect: Mood normal.         Assessment/Plan   Problem List Items Addressed This Visit             ICD-10-CM    HTN (hypertension) - Primary I10    MEMO (generalized anxiety disorder) F41.1    Stage 3a chronic kidney disease (CMS/HCC) N18.31   Recent hospitalization data and information reviewed, all reports, labs, radiological investigations and consultations and follow ups reviewed during this visit. Pt is doing well, precautions to be taken in the future for acute ailments or acute illness and change of condition are discussed, will continue to have close follow up, medication list was reviewed and updated and necessary changes were applied..  While I give the patient instructions how to check blood pressure water the precautions, position, relaxed state and what time of the day.  Then I gave her amlodipine 5 mg for any accelerated BP readings she gets she can use amlodipine.  Then I stopped Prozac because 20 mg of Prozac is not doing much and she does not tolerate 40 mg of Prozac we will start escitalopram 10 mg if needed we will go up to 20 mg.  Routine antihypertensives will be losartan and carvedilol because here her blood pressure was not high, she will continue all other medications and she will be seen at neck scheduled appointment.  Her creatinine was 1.01, LDL was 29, hemoglobin was 10.2, iron was low, ferritin is not done, percentage saturation for iron was low, I suggested patient to take iron 3 times a week.  Except for cardiology care all other care will be provided by me, she will keep her next scheduled appointment and this is a transitional care related visit.    Review of Systems   Constitutional: Negative.  Negative for fatigue.   Respiratory: Negative.     Cardiovascular: Negative.    Gastrointestinal: Negative.    Musculoskeletal: Negative.    Neurological: Negative.    Psychiatric/Behavioral:  The patient is nervous/anxious.        Family History   Problem  Relation Name Age of Onset    Heart attack Father  65    Heart disease Paternal Grandmother  65       No data recorded    No follow-ups on file.

## 2024-02-19 ENCOUNTER — OFFICE VISIT (OUTPATIENT)
Dept: CARDIOLOGY | Facility: CLINIC | Age: 64
End: 2024-02-19
Payer: COMMERCIAL

## 2024-02-19 VITALS
SYSTOLIC BLOOD PRESSURE: 112 MMHG | BODY MASS INDEX: 28.75 KG/M2 | HEART RATE: 68 BPM | DIASTOLIC BLOOD PRESSURE: 58 MMHG | WEIGHT: 162.3 LBS

## 2024-02-19 DIAGNOSIS — K21.9 GASTROESOPHAGEAL REFLUX DISEASE, UNSPECIFIED WHETHER ESOPHAGITIS PRESENT: ICD-10-CM

## 2024-02-19 DIAGNOSIS — I25.110 CORONARY ARTERY DISEASE INVOLVING NATIVE CORONARY ARTERY OF NATIVE HEART WITH UNSTABLE ANGINA PECTORIS (MULTI): ICD-10-CM

## 2024-02-19 DIAGNOSIS — R55 NEAR SYNCOPE: ICD-10-CM

## 2024-02-19 DIAGNOSIS — E78.2 MIXED HYPERLIPIDEMIA: ICD-10-CM

## 2024-02-19 DIAGNOSIS — Z87.891 FORMER CIGARETTE SMOKER: ICD-10-CM

## 2024-02-19 DIAGNOSIS — I10 PRIMARY HYPERTENSION: ICD-10-CM

## 2024-02-19 DIAGNOSIS — I21.19 STEMI INVOLVING OTH CORONARY ARTERY OF INFERIOR WALL (MULTI): ICD-10-CM

## 2024-02-19 DIAGNOSIS — I65.23 BILATERAL CAROTID ARTERY STENOSIS: ICD-10-CM

## 2024-02-19 PROCEDURE — 1160F RVW MEDS BY RX/DR IN RCRD: CPT | Performed by: STUDENT IN AN ORGANIZED HEALTH CARE EDUCATION/TRAINING PROGRAM

## 2024-02-19 PROCEDURE — 1125F AMNT PAIN NOTED PAIN PRSNT: CPT | Performed by: STUDENT IN AN ORGANIZED HEALTH CARE EDUCATION/TRAINING PROGRAM

## 2024-02-19 PROCEDURE — 99214 OFFICE O/P EST MOD 30 MIN: CPT | Performed by: STUDENT IN AN ORGANIZED HEALTH CARE EDUCATION/TRAINING PROGRAM

## 2024-02-19 PROCEDURE — 3008F BODY MASS INDEX DOCD: CPT | Performed by: STUDENT IN AN ORGANIZED HEALTH CARE EDUCATION/TRAINING PROGRAM

## 2024-02-19 PROCEDURE — 1159F MED LIST DOCD IN RCRD: CPT | Performed by: STUDENT IN AN ORGANIZED HEALTH CARE EDUCATION/TRAINING PROGRAM

## 2024-02-19 PROCEDURE — 3074F SYST BP LT 130 MM HG: CPT | Performed by: STUDENT IN AN ORGANIZED HEALTH CARE EDUCATION/TRAINING PROGRAM

## 2024-02-19 PROCEDURE — 3078F DIAST BP <80 MM HG: CPT | Performed by: STUDENT IN AN ORGANIZED HEALTH CARE EDUCATION/TRAINING PROGRAM

## 2024-02-19 PROCEDURE — 1111F DSCHRG MED/CURRENT MED MERGE: CPT | Performed by: STUDENT IN AN ORGANIZED HEALTH CARE EDUCATION/TRAINING PROGRAM

## 2024-02-19 PROCEDURE — 1036F TOBACCO NON-USER: CPT | Performed by: STUDENT IN AN ORGANIZED HEALTH CARE EDUCATION/TRAINING PROGRAM

## 2024-02-19 RX ORDER — FERROUS SULFATE 325(65) MG
65 TABLET, DELAYED RELEASE (ENTERIC COATED) ORAL
COMMUNITY
Start: 2023-10-16 | End: 2024-04-16 | Stop reason: SDUPTHER

## 2024-02-19 NOTE — PROGRESS NOTES
Chief complaint:   Chief Complaint   Patient presents with    Hospital Follow-up        History of Present Illness  Audrey Estrada is a 63 y.o. year old female patient with history of smoking, hyperlipidemia, and hypertension, CAD with STEMI status post PCI to circumflex and RCA in 10/2023 who is presenting for cardiovascular follow-up  At last visit, patient reported to be doing well. Denies chest pain, syncope or near syncope. Is tolerating Brilinta- does note some shortness of breath at times while moving around the house. Had remote episode of epistaxis, seen in ER (2023).  Tolerating medications, continues with cardiac rehab. Quit smoking after I-STEMI event in October.    Since last visit, patient had episode of chest pain and dizziness / near syncope- seen inpatient overnight 2024.       Social History     Tobacco Use    Smoking status: Former     Packs/day: 1.00     Years: 15.00     Additional pack years: 0.00     Total pack years: 15.00     Types: Cigarettes     Quit date: 2023     Years since quittin.3    Smokeless tobacco: Never   Vaping Use    Vaping Use: Never used   Substance Use Topics    Alcohol use: Never    Drug use: Never       Outpatient Medications:  Current Outpatient Medications   Medication Instructions    amLODIPine (Norvasc) 5 mg tablet Take one tablet as needed once a day if SBP more then 160 and DBP more then 90 mm Hg    aspirin 81 mg, oral, Daily    atorvastatin (LIPITOR) 80 mg, oral, Daily    carvedilol (COREG) 25 mg, oral, 2 times daily    cholecalciferol (VITAMIN D-3) 25 mcg, oral, Daily    escitalopram (LEXAPRO) 10 mg, oral, Daily    ferrous sulfate 65 mg, oral, Daily RT, Take on Mon, Wed, and Fri    losartan (COZAAR) 100 mg, oral, Daily    magnesium oxide (MAGOX) 400 mg, oral, Daily    omega-3 acid ethyl esters (Lovaza) 1 gram capsule TAKE 1 CAPSULE BY MOUTH TWICE A DAY    omeprazole OTC (PRILOSEC OTC) 1 mg, oral, Daily before breakfast, Do not crush, chew, or  split.    potassium chloride CR (Klor-Con) 10 mEq ER tablet 10 mEq, oral, Daily, Do not crush, chew, or split.    ticagrelor (BRILINTA) 90 mg, oral, 2 times daily         Vitals:  Vitals:    02/19/24 1026   BP: (!) 98/48   Pulse: 68       Physical Exam:  General: NAD, well-appearing  HEENT: moist mucous membranes, no jaundice  Neck: No JVD, no carotid bruit  Lungs: CTA luisa, no wheezing or rales  Cardiac: RRR, no murmurs  Abdomen: soft, non-tender, non-distended  Extremities: 2+ radial pulses, no edema, palpable pulses  Skin: warm, dry, no wound  Neurologic: AAOx3,  no focal deficits       Reviewed Study(s):    Carotid Ultrasound 12/4/23:  Utilizing the peak systolic velocities, the estimated degree of  stenosis within the internal carotid arteries is 50-69% on the left  and less than 50% on the right.      Lipid 1/2024:    HDL 34  LDL 29  Tri 232     Left Heart Cath:  10/1/2023- IVUS CARLOS ALBERTO to mid circumflex and Ostium of obtuse marginal  10/2/2023- IVUS CARLOS ALBERTO to mid to proximal RCA  10/3/2023- IVUS Shockwave IVL to the under expanded ostial RCA Stent; CARLOS ALBERTO to severe stenosis of mid to distal RPL Branch.   10/6/2023- Recurrent complaints of Chest Pain in post op stage, patent coronary stents with RUBI 3 flow in all vessels and no residual significant stenosis.      Echo: 10/1/2023:   1. Left ventricular systolic function is normal with a 60% estimated ejection fraction.   2. Spectral Doppler shows an impaired relaxation pattern of left ventricular diastolic filling.   3. There is evidence of mild mitral valve stenosis.   4. There is moderate mitral annular calcification.   5. Mild tricuspid regurgitation is visualized.   6. Mild aortic valve stenosis.   7. The main pulmonary artery is normal in size, and position, with normal bifurcation into the left and right pulmonary arteries.     Lexiscan Nuclear: 7/19/2021:  Normal Lexiscan myocardial perfusion study.  No evidence of ischemia or myocardial infarction by  perfusion imaging.  Normal left ventricular systolic function, ejection fraction is 68%.  There are no prior studies for comparison.  None invasive risk stratification is low risk         Assessment/Plan   Diagnoses and all orders for this visit:  Coronary artery disease involving native coronary artery of native heart with unstable angina pectoris (CMS/HCC)  STEMI involving oth coronary artery of inferior wall (CMS/HCC)  Primary hypertension  Mixed hyperlipidemia  Bilateral carotid artery stenosis  Gastroesophageal reflux disease, unspecified whether esophagitis present  Near syncope  Former cigarette smoker  BMI 32.0-32.9,adult    # Near Syncope  -  Will complete 30day monitor for further evaluation   -  closely monitor blood pressure at home    # Coronary Artery Disease, s/p PCI  - CAD with inferior STEMI status post PCI to circumflex and RCA in 10/2023   - Continue DAPT with aspirin and Brilinta for minimum 1 year  - Continue cardiac rehab      # Hyperlipidemia   - LDL at goal for patient (history CAD with CARLOS ALBERTO)   - Tolerating high dose oral statin, Lipitor 80mg     # Carotid Disease, bilaterally  - Discussed results today with patient  - Will repeat imaging in 1 year for follow up     Will call monitor results  Keep same future follow up     Nish Mendes MD [facility]  Interventional Cardiology  Endovascular Interventions  [email or web address]    **Disclaimer: This note was dictated by speech recognition, and every effort has been made to prevent any error in transcription, however minor errors may be present**

## 2024-02-19 NOTE — PATIENT INSTRUCTIONS
Patient to keep future follow up as scheduled with Dr. Nish Mendes MD [facility]     Office will arrange 30 day Jose of [facility] monitor in near future- will call with results.     No other changes today.   Continue same medications and treatments.   Patient educated on proper medication use.   Patient educated on risk factor modification.   Please bring any lab results from other providers / physicians to your next appointment.     Please bring all medicines, vitamins, and herbal supplements with you when you come to the office.     Prescriptions will not be filled unless you are compliant with your follow up appointments or have a follow up appointment scheduled as per instruction of your physician. Refills should be requested at the time of your visit.    IMarc RN am scribing for and in the presence of Dr. Nish Mendes MD [facility]

## 2024-02-21 NOTE — DOCUMENTATION CLARIFICATION NOTE
"    PATIENT:               EH ONTIVEROS  ACCT #:                  [id number]  MRN:                       [id number]  :                       1960  ADMIT DATE:       2024 7:56 PM  DISCH DATE:        2024 10:52 PM  RESPONDING PROVIDER #:        [id number]          PROVIDER RESPONSE TEXT:    Acute Kidney Injury is ruled out    CDI QUERY TEXT:    UH_CV ANGE        Instruction:    Based on your assessment of the patient and the clinical information, please provide the requested documentation by clicking on the appropriate radio button and enter any additional information if prompted.    Question: Please clarify the diagnosis of Acute Kidney Injury    When answering this query, please exercise your independent professional judgment. The fact that a question is being asked, does not imply that any particular answer is desired or expected.    The patient's clinical indicators include:  Clinical Information: 63 year old female admitted  with unspecified CP, uncontrolled blood pressure. Troponins negative. Anti-hypertensives were adjusted, pt. left AMA .    Documented Diagnosis: Daily Progress Note  per Dr. Mejia states: \"acute kidney injury 1.3 from normal baseline.\"    Clinical Indicators and Documentation: ED Note  per Dr. Barbosa states: \"significant past medical history of hypertension...chronic kidney disease\"    Progress Note 2023 per Dr. Araya states: \"Stage 3a chronic kidney disease\"    -SCr lab values: 2024: 1.08  2024: 1.31  2024: 1.01    Treatment: Trend daily lab, IVF.    Risk Factors: Hx CKD, HTN, CAD with unspecified CP.  Options provided:  -- Acute Kidney Injury is ruled out  -- Acute Kidney Injury ruled in as evidenced by, Please specify additional information below  -- Other - I will add my own diagnosis  -- Refer to Clinical Documentation Reviewer    Query created by: Nuha Neal on 2024 9:39 AM      Electronically signed by:  LOKESH MEJIA MD " 2/21/2024 5:28 PM

## 2024-02-23 ENCOUNTER — PATIENT OUTREACH (OUTPATIENT)
Dept: CARDIOLOGY | Facility: CLINIC | Age: 64
End: 2024-02-23
Payer: COMMERCIAL

## 2024-02-23 NOTE — PROGRESS NOTES
Unable to reach patient for call back after patient's follow up appointment with PCP.   ARIANAM with call back number for patient to call if needed   If no voicemail available call attempts x 2 were made to contact the patient to assist with any questions or concerns patient may have.

## 2024-02-29 ENCOUNTER — OFFICE VISIT (OUTPATIENT)
Dept: PRIMARY CARE | Facility: CLINIC | Age: 64
End: 2024-02-29
Payer: COMMERCIAL

## 2024-02-29 VITALS
BODY MASS INDEX: 28.7 KG/M2 | HEIGHT: 63 IN | SYSTOLIC BLOOD PRESSURE: 120 MMHG | HEART RATE: 67 BPM | DIASTOLIC BLOOD PRESSURE: 74 MMHG | WEIGHT: 162 LBS | TEMPERATURE: 96.9 F

## 2024-02-29 DIAGNOSIS — Z12.31 ENCOUNTER FOR SCREENING MAMMOGRAM FOR BREAST CANCER: ICD-10-CM

## 2024-02-29 DIAGNOSIS — Z00.00 ROUTINE GENERAL MEDICAL EXAMINATION AT HEALTH CARE FACILITY: Primary | ICD-10-CM

## 2024-02-29 PROCEDURE — 1160F RVW MEDS BY RX/DR IN RCRD: CPT | Performed by: INTERNAL MEDICINE

## 2024-02-29 PROCEDURE — G0402 INITIAL PREVENTIVE EXAM: HCPCS | Performed by: INTERNAL MEDICINE

## 2024-02-29 PROCEDURE — 1123F ACP DISCUSS/DSCN MKR DOCD: CPT | Performed by: INTERNAL MEDICINE

## 2024-02-29 PROCEDURE — 3074F SYST BP LT 130 MM HG: CPT | Performed by: INTERNAL MEDICINE

## 2024-02-29 PROCEDURE — 1036F TOBACCO NON-USER: CPT | Performed by: INTERNAL MEDICINE

## 2024-02-29 PROCEDURE — 1170F FXNL STATUS ASSESSED: CPT | Performed by: INTERNAL MEDICINE

## 2024-02-29 PROCEDURE — 1125F AMNT PAIN NOTED PAIN PRSNT: CPT | Performed by: INTERNAL MEDICINE

## 2024-02-29 PROCEDURE — 1159F MED LIST DOCD IN RCRD: CPT | Performed by: INTERNAL MEDICINE

## 2024-02-29 PROCEDURE — 3078F DIAST BP <80 MM HG: CPT | Performed by: INTERNAL MEDICINE

## 2024-02-29 PROCEDURE — 3008F BODY MASS INDEX DOCD: CPT | Performed by: INTERNAL MEDICINE

## 2024-02-29 ASSESSMENT — ACTIVITIES OF DAILY LIVING (ADL)
STIL DRIVING: YES
DRESSING YOURSELF: INDEPENDENT
ADEQUATE_TO_COMPLETE_ADL: YES
TAKING_MEDICATION: INDEPENDENT
WALKS IN HOME: INDEPENDENT
HEARING - LEFT EAR: FUNCTIONAL
PREPARING MEALS: INDEPENDENT
TAKING MEDICATION: INDEPENDENT
PATIENT'S MEMORY ADEQUATE TO SAFELY COMPLETE DAILY ACTIVITIES?: YES
MANAGING FINANCES: INDEPENDENT
ADEQUATE_TO_COMPLETE_ADL: YES
FEEDING YOURSELF: INDEPENDENT
JUDGMENT_ADEQUATE_SAFELY_COMPLETE_DAILY_ACTIVITIES: YES
BATHING: INDEPENDENT
FEEDING: INDEPENDENT
GROCERY_SHOPPING: INDEPENDENT
MANAGING_FINANCES: INDEPENDENT
NEEDS ASSISTANCE WITH FOOD: INDEPENDENT
GROCERY SHOPPING: INDEPENDENT
GROOMING: INDEPENDENT
BATHING: INDEPENDENT
DOING HOUSEWORK: INDEPENDENT
DRESSING: INDEPENDENT
USING TRANSPORTATION: INDEPENDENT
EATING: INDEPENDENT
DRESSING: INDEPENDENT
TOILETING: INDEPENDENT
HEARING - RIGHT EAR: FUNCTIONAL
JUDGMENT_ADEQUATE_SAFELY_COMPLETE_DAILY_ACTIVITIES: YES
USING TELEPHONE: INDEPENDENT
DOING_HOUSEWORK: INDEPENDENT
BATHING: INDEPENDENT
TOILETING: INDEPENDENT

## 2024-02-29 ASSESSMENT — PATIENT HEALTH QUESTIONNAIRE - PHQ9
2. FEELING DOWN, DEPRESSED OR HOPELESS: NOT AT ALL
SUM OF ALL RESPONSES TO PHQ9 QUESTIONS 1 AND 2: 0
SUM OF ALL RESPONSES TO PHQ9 QUESTIONS 1 AND 2: 0
1. LITTLE INTEREST OR PLEASURE IN DOING THINGS: NOT AT ALL
1. LITTLE INTEREST OR PLEASURE IN DOING THINGS: NOT AT ALL
2. FEELING DOWN, DEPRESSED OR HOPELESS: NOT AT ALL

## 2024-02-29 ASSESSMENT — ENCOUNTER SYMPTOMS
OCCASIONAL FEELINGS OF UNSTEADINESS: 0
MUSCULOSKELETAL NEGATIVE: 1
NEUROLOGICAL NEGATIVE: 1
RESPIRATORY NEGATIVE: 1
NERVOUS/ANXIOUS: 1
CONSTITUTIONAL NEGATIVE: 1
CARDIOVASCULAR NEGATIVE: 1
DEPRESSION: 0
EYES NEGATIVE: 1
LOSS OF SENSATION IN FEET: 0
GASTROINTESTINAL NEGATIVE: 1

## 2024-02-29 ASSESSMENT — COLUMBIA-SUICIDE SEVERITY RATING SCALE - C-SSRS
1. IN THE PAST MONTH, HAVE YOU WISHED YOU WERE DEAD OR WISHED YOU COULD GO TO SLEEP AND NOT WAKE UP?: NO
2. HAVE YOU ACTUALLY HAD ANY THOUGHTS OF KILLING YOURSELF?: NO

## 2024-02-29 NOTE — PROGRESS NOTES
"Subjective   Reason for Visit: Audrey Estrada is an 63 y.o. female here for a Medicare Wellness visit.     Past Medical, Surgical, and Family History reviewed and updated in chart.    Reviewed all medications by prescribing practitioner or clinical pharmacist (such as prescriptions, OTCs, herbal therapies and supplements) and documented in the medical record.    63-year-old female patient was seen for welcome Medicare exam.  She has a MI, MI was quite devastating to her but she is feeling much better now, she has a carotid Dopplers done left side has a 50 to 60% disease right side less than 50% disease.  She is on statin, she is on Brilinta, her colonoscopy is up to date, she has not have any mammogram, she has a pain and discomfort in the right knee for which injection therapy could be considered.  She does not have any angina, she is less anxious, therapeutic effects of Lexapro is awaited.  She remains calm and comfortable, there is no ER visit, electrolytes has not been checked, in February she has a CT scan of chest so that will serve as a low-dose CT scan of the lung for former smoker patient, she has to have a living will.  She is  for 48 years and she resides with her spouse.        Patient Care Team:  Cam Araya MD as PCP - General  Nish Parnell MD as PCP - [employer] PCP  Nish Parnell MD as Cardiologist (Cardiology)  Lilli Guerrero RN as Care Manager (Case Management)     Review of Systems   Constitutional: Negative.    HENT: Negative.     Eyes: Negative.    Respiratory: Negative.     Cardiovascular: Negative.    Gastrointestinal: Negative.    Musculoskeletal: Negative.    Skin: Negative.    Neurological: Negative.    Psychiatric/Behavioral:  The patient is nervous/anxious.        Objective   Vitals:  /74 (BP Location: Right arm, Patient Position: Sitting, BP Cuff Size: Adult)   Pulse 67   Temp 36.1 °C (96.9 °F) (Temporal)   Ht 1.6 m (5' 3\") "   Wt 73.5 kg (162 lb)   BMI 28.70 kg/m²       Physical Exam  Constitutional:       Appearance: Normal appearance. She is overweight.   HENT:      Head: Normocephalic.      Nose: Nose normal.   Eyes:      Conjunctiva/sclera: Conjunctivae normal.   Cardiovascular:      Rate and Rhythm: Normal rate and regular rhythm.      Pulses: Normal pulses.      Heart sounds: Normal heart sounds.   Pulmonary:      Effort: Pulmonary effort is normal.      Breath sounds: Normal breath sounds.   Abdominal:      Palpations: Abdomen is soft.   Musculoskeletal:         General: Normal range of motion.      Cervical back: Neck supple.   Skin:     General: Skin is warm and dry.   Neurological:      General: No focal deficit present.      Mental Status: She is oriented to person, place, and time. Mental status is at baseline.   Psychiatric:         Mood and Affect: Mood normal.         Judgment: Judgment normal.       Assessment/Plan   Problem List Items Addressed This Visit    None  Visit Diagnoses       Routine general medical examination at health care facility    -  Primary    Encounter for screening mammogram for breast cancer        Relevant Orders    BI mammo bilateral screening tomosynthesis        I spent 6 minutes discussing advance care planning including the explanation and discussion of advance directives. If pt does not have currant up to date documents, examples and information provided on how to create both living will and power of .Patient was encouraged to work on completing these documents.  Wellness exam was completed, this is a welcome Medicare Medicare wellness exam we did not do EKG because she just have a coronary event.  Therapeutic effects of Lexapro is expected, beta-blocker to be continued, amlodipine is not used frequently, losartan to be continued, BP readings are stable, Brilinta therapy for at least 12 months.  She lives in the independent setting, she has been following her lifestyle modifications  much better, make sure that we have a proper sleep, make sure we have a proper amount of water consumption and plant-based diet.  I reassured her, coronary events can happen unexpectedly and we just have to deal with it in view to make changes in the life I told her and I congratulated on not smoking at least for last several weeks to months now, no new orders were given besides mammogram, wellness exam related welcome preventive care visit was completed, follow-up in 3 months.

## 2024-03-04 DIAGNOSIS — M25.561 ACUTE PAIN OF RIGHT KNEE: ICD-10-CM

## 2024-03-07 ENCOUNTER — OFFICE VISIT (OUTPATIENT)
Dept: ORTHOPEDIC SURGERY | Facility: CLINIC | Age: 64
End: 2024-03-07
Payer: COMMERCIAL

## 2024-03-07 DIAGNOSIS — M17.11 PRIMARY LOCALIZED OSTEOARTHRITIS OF RIGHT KNEE: Primary | ICD-10-CM

## 2024-03-07 PROCEDURE — 1159F MED LIST DOCD IN RCRD: CPT | Performed by: INTERNAL MEDICINE

## 2024-03-07 PROCEDURE — 1125F AMNT PAIN NOTED PAIN PRSNT: CPT | Performed by: INTERNAL MEDICINE

## 2024-03-07 PROCEDURE — 1123F ACP DISCUSS/DSCN MKR DOCD: CPT | Performed by: INTERNAL MEDICINE

## 2024-03-07 PROCEDURE — 1160F RVW MEDS BY RX/DR IN RCRD: CPT | Performed by: INTERNAL MEDICINE

## 2024-03-07 PROCEDURE — 1036F TOBACCO NON-USER: CPT | Performed by: INTERNAL MEDICINE

## 2024-03-07 PROCEDURE — 3008F BODY MASS INDEX DOCD: CPT | Performed by: INTERNAL MEDICINE

## 2024-03-07 PROCEDURE — 99213 OFFICE O/P EST LOW 20 MIN: CPT | Performed by: INTERNAL MEDICINE

## 2024-03-07 PROCEDURE — 20610 DRAIN/INJ JOINT/BURSA W/O US: CPT | Performed by: INTERNAL MEDICINE

## 2024-03-07 RX ORDER — BETAMETHASONE SODIUM PHOSPHATE AND BETAMETHASONE ACETATE 3; 3 MG/ML; MG/ML
3 INJECTION, SUSPENSION INTRA-ARTICULAR; INTRALESIONAL; INTRAMUSCULAR; SOFT TISSUE
Status: COMPLETED | OUTPATIENT
Start: 2024-03-07 | End: 2024-03-07

## 2024-03-07 RX ORDER — LIDOCAINE HYDROCHLORIDE 10 MG/ML
6 INJECTION INFILTRATION; PERINEURAL
Status: COMPLETED | OUTPATIENT
Start: 2024-03-07 | End: 2024-03-07

## 2024-03-07 RX ADMIN — BETAMETHASONE SODIUM PHOSPHATE AND BETAMETHASONE ACETATE 3 ML: 3; 3 INJECTION, SUSPENSION INTRA-ARTICULAR; INTRALESIONAL; INTRAMUSCULAR; SOFT TISSUE at 14:21

## 2024-03-07 RX ADMIN — LIDOCAINE HYDROCHLORIDE 6 ML: 10 INJECTION INFILTRATION; PERINEURAL at 14:21

## 2024-03-07 NOTE — PROGRESS NOTES
New Patient Visit    CC: No chief complaint on file.      HPI: Audrey is a 63 y.o. female presents for evaluation for right knee pain secondary to known osteoarthritis of the right knee. She states that she would like to have a cortisone injection to the right knee today.  She had an injection in 2019 which gave her some relief.  She states that she is not diabetic.  Had a recent myocardial infarction in October status post PCI, thus is not a candidate for any elective surgery for at least 1 year or elective knee replacement.  She is requesting a cortisone shot today and other options for knee arthritis.      Review of Systems   GENERAL: Negative for malaise, significant weight loss, fever  MUSCULOSKELETAL: See HPI  NEURO:  Negative for numbness / tingling     Past Medical History  Past Medical History:   Diagnosis Date    Hyperlipidemia        Medication review  Medication Documentation Review Audit       Reviewed by Cam Araya MD (Physician) on 02/29/24 at 1309      Medication Order Taking? Sig Documenting Provider Last Dose Status   amLODIPine (Norvasc) 5 mg tablet [id number] No Take one tablet as needed once a day if SBP more then 160 and DBP more then 90 mm Hg Cam Araya MD Taking Active   aspirin 81 mg EC tablet [id number] No Take 1 tablet (81 mg) by mouth once daily. Jay Chester, APRN-CNP Taking Active   atorvastatin (Lipitor) 80 mg tablet [id number] No Take 1 tablet (80 mg) by mouth once daily. Nish Parnell MD Taking Active   carvedilol (Coreg) 25 mg tablet [id number] No Take 1 tablet (25 mg) by mouth 2 times a day. Nish Parnell MD Taking Active   cholecalciferol (Vitamin D-3) 25 MCG (1000 UT) capsule [id number] No Take 1 capsule (25 mcg) by mouth once daily. Historical Provider, MD Taking Active   escitalopram (Lexapro) 10 mg tablet [id number] No Take 1 tablet (10 mg) by mouth once daily. Cam Araya MD Taking Active   ferrous sulfate 325 (65 Fe) MG EC tablet  [id number] No Take 65 mg by mouth once daily. Take on Mon, Wed, and Fri Historical Provider, MD Taking Active   losartan (Cozaar) 100 mg tablet [id number] No Take 1 tablet (100 mg) by mouth once daily. Nish Parnell MD Taking Active   magnesium oxide (MagOx) 400 mg (241.3 mg magnesium) tablet [id number] No Take 1 tablet (400 mg) by mouth once daily. Cam Araya MD Taking Active   omega-3 acid ethyl esters (Lovaza) 1 gram capsule [id number] No TAKE 1 CAPSULE BY MOUTH TWICE A DAY Cam Araya MD Taking Active   omeprazole OTC (PriLOSEC OTC) 20 mg EC tablet [id number] No Take 1 mg by mouth once daily in the morning. Take before meals. Do not crush, chew, or split. Historical Provider, MD Taking Active   potassium chloride CR (Klor-Con) 10 mEq ER tablet [id number] No Take 1 tablet (10 mEq) by mouth once daily. Do not crush, chew, or split. Cam Araya MD Taking Active   ticagrelor (Brilinta) 90 mg tablet [id number] No Take 1 tablet (90 mg) by mouth 2 times a day. Nish Parnell MD Taking Active                    Allergies  No Known Allergies    Social History  Social History     Socioeconomic History    Marital status:      Spouse name: Not on file    Number of children: Not on file    Years of education: Not on file    Highest education level: Not on file   Occupational History    Not on file   Tobacco Use    Smoking status: Former     Packs/day: 1.00     Years: 15.00     Additional pack years: 0.00     Total pack years: 15.00     Types: Cigarettes     Quit date: 2023     Years since quittin.4    Smokeless tobacco: Never   Vaping Use    Vaping Use: Never used   Substance and Sexual Activity    Alcohol use: Never    Drug use: Never    Sexual activity: Defer   Other Topics Concern    Not on file   Social History Narrative    Not on file     Social Determinants of Health     Financial Resource Strain: Low Risk  (2024)    Overall Financial Resource Strain (CARDIA)      Difficulty of Paying Living Expenses: Not very hard   Food Insecurity: No Food Insecurity (10/2/2023)    Hunger Vital Sign     Worried About Running Out of Food in the Last Year: Never true     Ran Out of Food in the Last Year: Never true   Transportation Needs: No Transportation Needs (2/8/2024)    PRAPARE - Transportation     Lack of Transportation (Medical): No     Lack of Transportation (Non-Medical): No   Physical Activity: Sufficiently Active (10/2/2023)    Exercise Vital Sign     Days of Exercise per Week: 5 days     Minutes of Exercise per Session: 30 min   Stress: Stress Concern Present (10/2/2023)    [employer] [place] [employer] - Occupational Stress Questionnaire     Feeling of Stress : To some extent   Social Connections: Moderately Isolated (10/2/2023)    Social Connection and Isolation Panel [NHANES]     Frequency of Communication with Friends and Family: More than three times a week     Frequency of Social Gatherings with Friends and Family: Once a week     Attends Hindu Services: Never     Active Member of Clubs or Organizations: No     Attends Club or Organization Meetings: Never     Marital Status:    Intimate Partner Violence: Not At Risk (10/2/2023)    Humiliation, Afraid, Rape, and Kick questionnaire     Fear of Current or Ex-Partner: No     Emotionally Abused: No     Physically Abused: No     Sexually Abused: No   Housing Stability: Low Risk  (2/8/2024)    Housing Stability Vital Sign     Unable to Pay for Housing in the Last Year: No     Number of Places Lived in the Last Year: 0     Unstable Housing in the Last Year: No       Surgical History  Past Surgical History:   Procedure Laterality Date    CARDIAC CATHETERIZATION N/A 10/2/2023    Procedure: PCI CARLOS ALBERTO Stent- Coronary;  Surgeon: Nish Parnell MD;  Location: [facility];  Service: Cardiovascular;  Laterality: N/A;  RCA PCI. Please schedule to follow my 2nd case of the afternoon    CARDIAC  CATHETERIZATION N/A 10/2/2023    Procedure: IVUS - Coronary;  Surgeon: Nish Parnell MD;  Location: [facility];  Service: Cardiovascular;  Laterality: N/A;    CARDIAC CATHETERIZATION N/A 10/1/2023    Procedure: Left Heart Cath, No LV;  Surgeon: Nish Parnell MD;  Location: [facility];  Service: Cardiovascular;  Laterality: N/A;    CARDIAC CATHETERIZATION N/A 10/1/2023    Procedure: PCI;  Surgeon: Nish Parnell MD;  Location: [facility];  Service: Cardiovascular;  Laterality: N/A;    CARDIAC CATHETERIZATION N/A 10/6/2023    Procedure: Left Heart Cath;  Surgeon: Nish Parnell MD;  Location: [facility];  Service: Cardiovascular;  Laterality: N/A;    CARDIAC CATHETERIZATION N/A 10/6/2023    Procedure: PCI CARLOS ALBERTO Stent- Coronary;  Surgeon: Nish Parnell MD;  Location: [facility];  Service: Cardiovascular;  Laterality: N/A;  RCA PCI with Shockwave. Please schedule at 9 am if possible    CARDIAC CATHETERIZATION N/A 10/6/2023    Procedure: IVUS - Coronary;  Surgeon: Nish Parnell MD;  Location: [facility];  Service: Cardiovascular;  Laterality: N/A;    CORONARY ANGIOPLASTY WITH STENT PLACEMENT      US ASPIRATION INJECTION INTERMEDIATE JOINT  12/23/2019    US ASPIRATION INJECTION INTERMEDIATE JOINT 12/23/2019 [facility]    US GUIDED THYROID BIOPSY  12/13/2019    US GUIDED THYROID BIOPSY 12/13/2019 [facility]       Physical Exam:  GENERAL:  Patient is awake, alert, and oriented to person place and time.  Patient appears well nourished and well kept.  Affect Calm, Not Acutely Distressed.  HEENT:  Normocephalic, Atraumatic, EOMI  CARDIOVASCULAR:  Hemodynamically stable.  RESPIRATORY:  Normal respirations with unlabored breathing.  Extremity: Right knee shows skin is intact.  Trace to 1+ effusion.  She can flex her right knee to 120 degrees with pain.  Full extension is  0 degrees.  Pain and instability with valgus stress test.  Negative varus tests.  Pain over the medial joint line.  Negative Lachman's test.  Negative Mikala's test.  Mild pain over the lateral joint.  Patellar and quadricep mechanism tact.  Left knee was examined for comparison.      Diagnostics: Previous x-rays reviewed      Procedure: L Inj/Asp: R knee on 3/7/2024 2:21 PM  Indications: pain  Details: 22 G needle, lateral approach  Medications: 3 mL betamethasone acet,sod phos 6 mg/mL; 6 mL lidocaine 10 mg/mL (1 %)  Outcome: tolerated well, no immediate complications  Procedure, treatment alternatives, risks and benefits explained, specific risks discussed. Consent was given by the patient. Immediately prior to procedure a time out was called to verify the correct patient, procedure, equipment, support staff and site/side marked as required. Patient was prepped and draped in the usual sterile fashion.             Assessment: Right knee osteoarthritis    Plan: Audrey presents for right knee pain secondary to known right knee osteoarthritis and is requesting today for intra-articular corticosteroid injection to the right knee. She received the corticosteroid injection to the right knee today. She tolerated the injection and had immediate relief after the injection. We measured her for a medial OA knee brace for support and stability.  Will also submit for 3 series viscosupplementation injection.    In conclusion, this patient has OA of the knee which is symptomatic causing significant morning stiffness lasting up to an hour with popping, clicking, grinding with ROM, which is worse with prolonged standing or going up/down stairs.  This affects functional activities and ADLs.  There is radiographic evidence of OA with joint space narrowing and marginal osteophyte formation.  This patient has also failed pharmacologic treatment for OA including OTC analgesics, antiinflammatory medications, as well as intraarticular  corticosteroid injections.  For these reasons, I feel the patient is a candidate for viscosupplementation injections of the right knee.     No orders of the defined types were placed in this encounter.     At the conclusion of the visit there were no further questions by the patient/family regarding their plan of care.  Patient was instructed to call or return with any issues, questions, or concerns regarding their injury and/or treatment plan described above.     03/07/24 at 1:21 PM - Jannette Desouza MD  Scribe Attestation  By signing my name below, I, Palomo Zeenat, Scribe   attest that this documentation has been prepared under the direction and in the presence of Jannette Desouza MD.    Office: [phone]    This note was prepared using voice recognition software.  The details of this note are correct and have been reviewed, and corrected to the best of my ability.  Some grammatical errors may persist related to the Dragon software.

## 2024-03-08 ENCOUNTER — TELEPHONE (OUTPATIENT)
Dept: ORTHOPEDIC SURGERY | Facility: CLINIC | Age: 64
End: 2024-03-08
Payer: COMMERCIAL

## 2024-03-08 NOTE — TELEPHONE ENCOUNTER
3/8/24 - medial Freestyle Oa brace approved $0  Brace will be ordered and will call patient when it arrives to schedule fitting appointment.   Pt has requested to be fit in [place] if at all possible

## 2024-03-11 ENCOUNTER — TELEPHONE (OUTPATIENT)
Dept: ORTHOPEDIC SURGERY | Facility: CLINIC | Age: 64
End: 2024-03-11
Payer: COMMERCIAL

## 2024-03-11 NOTE — TELEPHONE ENCOUNTER
03/11/24  [facility] for pt re insurance benefits for OA knee brace and that there will be no OOP cost to her.  Because of this, the brace was ordered, has arrived and she may contact our office to schedule a fitting appt.

## 2024-03-14 ENCOUNTER — APPOINTMENT (OUTPATIENT)
Dept: ORTHOPEDIC SURGERY | Facility: CLINIC | Age: 64
End: 2024-03-14
Payer: COMMERCIAL

## 2024-03-15 ENCOUNTER — ANCILLARY PROCEDURE (OUTPATIENT)
Dept: CARDIOLOGY | Facility: CLINIC | Age: 64
End: 2024-03-15
Payer: COMMERCIAL

## 2024-03-15 DIAGNOSIS — R55 NEAR SYNCOPE: ICD-10-CM

## 2024-03-15 DIAGNOSIS — I10 PRIMARY HYPERTENSION: ICD-10-CM

## 2024-03-15 PROCEDURE — 93272 ECG/REVIEW INTERPRET ONLY: CPT | Performed by: INTERNAL MEDICINE

## 2024-03-21 ENCOUNTER — HOSPITAL ENCOUNTER (OUTPATIENT)
Dept: RADIOLOGY | Facility: CLINIC | Age: 64
Discharge: HOME | End: 2024-03-21
Payer: COMMERCIAL

## 2024-03-21 VITALS — BODY MASS INDEX: 33.26 KG/M2 | HEIGHT: 59 IN | WEIGHT: 165 LBS

## 2024-03-21 DIAGNOSIS — R92.8 ABNORMAL MAMMOGRAM: ICD-10-CM

## 2024-03-21 DIAGNOSIS — Z12.31 ENCOUNTER FOR SCREENING MAMMOGRAM FOR BREAST CANCER: ICD-10-CM

## 2024-03-21 PROCEDURE — 77063 BREAST TOMOSYNTHESIS BI: CPT | Performed by: RADIOLOGY

## 2024-03-21 PROCEDURE — 77067 SCR MAMMO BI INCL CAD: CPT | Performed by: RADIOLOGY

## 2024-03-21 PROCEDURE — 77067 SCR MAMMO BI INCL CAD: CPT

## 2024-03-22 ENCOUNTER — PATIENT OUTREACH (OUTPATIENT)
Dept: CARDIOLOGY | Facility: CLINIC | Age: 64
End: 2024-03-22
Payer: COMMERCIAL

## 2024-03-25 ENCOUNTER — APPOINTMENT (OUTPATIENT)
Dept: CARDIAC REHAB | Facility: HOSPITAL | Age: 64
End: 2024-03-25
Payer: COMMERCIAL

## 2024-03-28 ENCOUNTER — APPOINTMENT (OUTPATIENT)
Dept: CARDIAC REHAB | Facility: HOSPITAL | Age: 64
End: 2024-03-28
Payer: COMMERCIAL

## 2024-04-01 ENCOUNTER — APPOINTMENT (OUTPATIENT)
Dept: CARDIAC REHAB | Facility: HOSPITAL | Age: 64
End: 2024-04-01
Payer: COMMERCIAL

## 2024-04-04 ENCOUNTER — APPOINTMENT (OUTPATIENT)
Dept: CARDIAC REHAB | Facility: HOSPITAL | Age: 64
End: 2024-04-04
Payer: COMMERCIAL

## 2024-04-08 ENCOUNTER — APPOINTMENT (OUTPATIENT)
Dept: CARDIAC REHAB | Facility: HOSPITAL | Age: 64
End: 2024-04-08
Payer: COMMERCIAL

## 2024-04-09 ENCOUNTER — HOSPITAL ENCOUNTER (OUTPATIENT)
Dept: RADIOLOGY | Facility: EXTERNAL LOCATION | Age: 64
Discharge: HOME | End: 2024-04-09

## 2024-04-09 ENCOUNTER — OFFICE VISIT (OUTPATIENT)
Dept: ORTHOPEDIC SURGERY | Facility: CLINIC | Age: 64
End: 2024-04-09
Payer: COMMERCIAL

## 2024-04-09 DIAGNOSIS — M17.10 ARTHRITIS OF KNEE: ICD-10-CM

## 2024-04-09 PROCEDURE — 1123F ACP DISCUSS/DSCN MKR DOCD: CPT | Performed by: INTERNAL MEDICINE

## 2024-04-09 PROCEDURE — 1160F RVW MEDS BY RX/DR IN RCRD: CPT | Performed by: INTERNAL MEDICINE

## 2024-04-09 PROCEDURE — 3008F BODY MASS INDEX DOCD: CPT | Performed by: INTERNAL MEDICINE

## 2024-04-09 PROCEDURE — 1159F MED LIST DOCD IN RCRD: CPT | Performed by: INTERNAL MEDICINE

## 2024-04-09 PROCEDURE — 20611 DRAIN/INJ JOINT/BURSA W/US: CPT | Mod: RT | Performed by: INTERNAL MEDICINE

## 2024-04-09 PROCEDURE — 2500000004 HC RX 250 GENERAL PHARMACY W/ HCPCS (ALT 636 FOR OP/ED): Mod: JZ | Performed by: INTERNAL MEDICINE

## 2024-04-09 RX ADMIN — Medication 2 ML: at 18:51

## 2024-04-09 NOTE — PROGRESS NOTES
CC:   Chief Complaint   Patient presents with    Right Knee - Pain     Rt knee gel synvisc #1       HPI: Audrey is a 63 y.o. female presents for her first Synvisc injection to the right knee for knee osteoarthritis.        Review of Systems   GENERAL: Negative for malaise, significant weight loss, fever  MUSCULOSKELETAL: See HPI  NEURO:  Negative for numbness / tingling     Past Medical History  Past Medical History:   Diagnosis Date    Hyperlipidemia        Medication review  Medication Documentation Review Audit       Reviewed by Cam Araya MD (Physician) on 02/29/24 at 1309      Medication Order Taking? Sig Documenting Provider Last Dose Status   amLODIPine (Norvasc) 5 mg tablet [id number] No Take one tablet as needed once a day if SBP more then 160 and DBP more then 90 mm Hg Cam Araya MD Taking Active   aspirin 81 mg EC tablet [id number] No Take 1 tablet (81 mg) by mouth once daily. Jay Chester, APRN-CNP Taking Active   atorvastatin (Lipitor) 80 mg tablet [id number] No Take 1 tablet (80 mg) by mouth once daily. Nish Parnell MD Taking Active   carvedilol (Coreg) 25 mg tablet [id number] No Take 1 tablet (25 mg) by mouth 2 times a day. Nish Parnell MD Taking Active   cholecalciferol (Vitamin D-3) 25 MCG (1000 UT) capsule [id number] No Take 1 capsule (25 mcg) by mouth once daily. Walker Dahl MD Taking Active   escitalopram (Lexapro) 10 mg tablet [id number] No Take 1 tablet (10 mg) by mouth once daily. Cam Araya MD Taking Active   ferrous sulfate 325 (65 Fe) MG EC tablet [id number] No Take 65 mg by mouth once daily. Take on Mon, Wed, and Fri Historical Provider, MD Taking Active   losartan (Cozaar) 100 mg tablet [id number] No Take 1 tablet (100 mg) by mouth once daily. Nish Parnell MD Taking Active   magnesium oxide (MagOx) 400 mg (241.3 mg magnesium) tablet [id number] No Take 1 tablet (400 mg) by mouth once daily. Cam Araya MD Taking  Active   omega-3 acid ethyl esters (Lovaza) 1 gram capsule [id number] No TAKE 1 CAPSULE BY MOUTH TWICE A DAY Cam Araya MD Taking Active   omeprazole OTC (PriLOSEC OTC) 20 mg EC tablet [id number] No Take 1 mg by mouth once daily in the morning. Take before meals. Do not crush, chew, or split. Walker Dahl MD Taking Active   potassium chloride CR (Klor-Con) 10 mEq ER tablet [id number] No Take 1 tablet (10 mEq) by mouth once daily. Do not crush, chew, or split. Cam Araya MD Taking Active   ticagrelor (Brilinta) 90 mg tablet [id number] No Take 1 tablet (90 mg) by mouth 2 times a day. Nish Parnell MD Taking Active                    Allergies  No Known Allergies    Social History  Social History     Socioeconomic History    Marital status:      Spouse name: Not on file    Number of children: Not on file    Years of education: Not on file    Highest education level: Not on file   Occupational History    Not on file   Tobacco Use    Smoking status: Former     Packs/day: 1.00     Years: 15.00     Additional pack years: 0.00     Total pack years: 15.00     Types: Cigarettes     Quit date: 2023     Years since quittin.5    Smokeless tobacco: Never   Vaping Use    Vaping Use: Never used   Substance and Sexual Activity    Alcohol use: Never    Drug use: Never    Sexual activity: Defer   Other Topics Concern    Not on file   Social History Narrative    Not on file     Social Determinants of Health     Financial Resource Strain: Low Risk  (2024)    Overall Financial Resource Strain (CARDIA)     Difficulty of Paying Living Expenses: Not very hard   Food Insecurity: No Food Insecurity (10/2/2023)    Hunger Vital Sign     Worried About Running Out of Food in the Last Year: Never true     Ran Out of Food in the Last Year: Never true   Transportation Needs: No Transportation Needs (2024)    PRAPARE - Transportation     Lack of Transportation (Medical): No     Lack of  Transportation (Non-Medical): No   Physical Activity: Sufficiently Active (10/2/2023)    Exercise Vital Sign     Days of Exercise per Week: 5 days     Minutes of Exercise per Session: 30 min   Stress: Stress Concern Present (10/2/2023)    [employer] [place] [employer] - Occupational Stress Questionnaire     Feeling of Stress : To some extent   Social Connections: Moderately Isolated (10/2/2023)    Social Connection and Isolation Panel [NHANES]     Frequency of Communication with Friends and Family: More than three times a week     Frequency of Social Gatherings with Friends and Family: Once a week     Attends Adventist Services: Never     Active Member of Clubs or Organizations: No     Attends Club or Organization Meetings: Never     Marital Status:    Intimate Partner Violence: Not At Risk (10/2/2023)    Humiliation, Afraid, Rape, and Kick questionnaire     Fear of Current or Ex-Partner: No     Emotionally Abused: No     Physically Abused: No     Sexually Abused: No   Housing Stability: Low Risk  (2/8/2024)    Housing Stability Vital Sign     Unable to Pay for Housing in the Last Year: No     Number of Places Lived in the Last Year: 0     Unstable Housing in the Last Year: No       Surgical History  Past Surgical History:   Procedure Laterality Date    CARDIAC CATHETERIZATION N/A 10/2/2023    Procedure: PCI CARLOS ALBERTO Stent- Coronary;  Surgeon: Nish Parnell MD;  Location: [facility];  Service: Cardiovascular;  Laterality: N/A;  RCA PCI. Please schedule to follow my 2nd case of the afternoon    CARDIAC CATHETERIZATION N/A 10/2/2023    Procedure: IVUS - Coronary;  Surgeon: Nish Parnell MD;  Location: [facility];  Service: Cardiovascular;  Laterality: N/A;    CARDIAC CATHETERIZATION N/A 10/1/2023    Procedure: Left Heart Cath, No LV;  Surgeon: Nish Parnell MD;  Location: [facility];  Service: Cardiovascular;  Laterality: N/A;     CARDIAC CATHETERIZATION N/A 10/1/2023    Procedure: PCI;  Surgeon: Nish Parnell MD;  Location: [facility];  Service: Cardiovascular;  Laterality: N/A;    CARDIAC CATHETERIZATION N/A 10/6/2023    Procedure: Left Heart Cath;  Surgeon: Nish Parnell MD;  Location: [facility];  Service: Cardiovascular;  Laterality: N/A;    CARDIAC CATHETERIZATION N/A 10/6/2023    Procedure: PCI CARLOS ALBERTO Stent- Coronary;  Surgeon: Nish Parnell MD;  Location: [facility];  Service: Cardiovascular;  Laterality: N/A;  RCA PCI with Shockwave. Please schedule at 9 am if possible    CARDIAC CATHETERIZATION N/A 10/6/2023    Procedure: IVUS - Coronary;  Surgeon: Nish Parnell MD;  Location: [facility];  Service: Cardiovascular;  Laterality: N/A;    CORONARY ANGIOPLASTY WITH STENT PLACEMENT      US ASPIRATION INJECTION INTERMEDIATE JOINT  12/23/2019    US ASPIRATION INJECTION INTERMEDIATE JOINT 12/23/2019 [facility]    US GUIDED THYROID BIOPSY  12/13/2019    US GUIDED THYROID BIOPSY 12/13/2019 [facility]       Physical Exam:  GENERAL:  Patient is awake, alert, and oriented to person place and time.  Patient appears well nourished and well kept.  Affect Calm, Not Acutely Distressed.  HEENT:  Normocephalic, Atraumatic, EOMI  CARDIOVASCULAR:  Hemodynamically stable.  RESPIRATORY:  Normal respirations with unlabored breathing.  Extremity: Right knee skin is intact. No clinical signs of infection.      Diagnostics: None today  Point of Care Ultrasound  These images are not reportable by radiology and will not be interpreted   by  Radiologists.        Procedure: L Inj/Asp: R knee on 4/9/2024 6:51 PM  Indications: pain  Details: 22 G needle, ultrasound-guided lateral approach  Medications: 2 mL hylan 16 mg/2 mL  Outcome: tolerated well, no immediate complications  Procedure, treatment alternatives, risks and benefits explained, specific  risks discussed. Consent was given by the patient. Immediately prior to procedure a time out was called to verify the correct patient, procedure, equipment, support staff and site/side marked as required. Patient was prepped and draped in the usual sterile fashion.             Assessment: Right knee osteoarthritis    Plan: Audrey presents today for her first ultrasound-guided Synvisc injection to the right knee for knee osteoarthritis. She tolerated her injection today. She will follow-up next week for her second Synvisc injection.     Orders Placed This Encounter    Point of Care Ultrasound      At the conclusion of the visit there were no further questions by the patient/family regarding their plan of care.  Patient was instructed to call or return with any issues, questions, or concerns regarding their injury and/or treatment plan described above.     04/09/24 at 5:25 PM - Jannette Desouza MD  Scribe Attestation  By signing my name below, I Palomo Espinozamo, Scribe   attest that this documentation has been prepared under the direction and in the presence of Jannette Desouza MD.    Office: [phone]    This note was prepared using voice recognition software.  The details of this note are correct and have been reviewed, and corrected to the best of my ability.  Some grammatical errors may persist related to the Dragon software.

## 2024-04-10 ENCOUNTER — HOSPITAL ENCOUNTER (OUTPATIENT)
Dept: RADIOLOGY | Facility: HOSPITAL | Age: 64
Discharge: HOME | End: 2024-04-10
Payer: COMMERCIAL

## 2024-04-10 VITALS — HEIGHT: 59 IN | BODY MASS INDEX: 34.27 KG/M2 | WEIGHT: 170 LBS

## 2024-04-10 DIAGNOSIS — R92.8 ABNORMAL MAMMOGRAM: ICD-10-CM

## 2024-04-10 PROCEDURE — G0279 TOMOSYNTHESIS, MAMMO: HCPCS | Mod: RIGHT SIDE | Performed by: RADIOLOGY

## 2024-04-10 PROCEDURE — 77065 DX MAMMO INCL CAD UNI: CPT | Mod: RIGHT SIDE | Performed by: RADIOLOGY

## 2024-04-10 PROCEDURE — 76642 ULTRASOUND BREAST LIMITED: CPT | Mod: RT

## 2024-04-10 PROCEDURE — 77065 DX MAMMO INCL CAD UNI: CPT | Mod: RT

## 2024-04-10 PROCEDURE — 76642 ULTRASOUND BREAST LIMITED: CPT | Mod: RIGHT SIDE | Performed by: RADIOLOGY

## 2024-04-11 ENCOUNTER — APPOINTMENT (OUTPATIENT)
Dept: CARDIAC REHAB | Facility: HOSPITAL | Age: 64
End: 2024-04-11
Payer: COMMERCIAL

## 2024-04-14 ENCOUNTER — APPOINTMENT (OUTPATIENT)
Dept: RADIOLOGY | Facility: HOSPITAL | Age: 64
End: 2024-04-14
Payer: COMMERCIAL

## 2024-04-14 ENCOUNTER — HOSPITAL ENCOUNTER (EMERGENCY)
Facility: HOSPITAL | Age: 64
Discharge: HOME | End: 2024-04-14
Attending: STUDENT IN AN ORGANIZED HEALTH CARE EDUCATION/TRAINING PROGRAM
Payer: COMMERCIAL

## 2024-04-14 ENCOUNTER — APPOINTMENT (OUTPATIENT)
Dept: CARDIOLOGY | Facility: HOSPITAL | Age: 64
End: 2024-04-14
Payer: COMMERCIAL

## 2024-04-14 ENCOUNTER — HOSPITAL ENCOUNTER (OUTPATIENT)
Dept: CARDIOLOGY | Facility: HOSPITAL | Age: 64
Discharge: HOME | End: 2024-04-14
Payer: COMMERCIAL

## 2024-04-14 VITALS
HEIGHT: 59 IN | DIASTOLIC BLOOD PRESSURE: 60 MMHG | WEIGHT: 170 LBS | RESPIRATION RATE: 16 BRPM | BODY MASS INDEX: 34.27 KG/M2 | SYSTOLIC BLOOD PRESSURE: 127 MMHG | HEART RATE: 62 BPM | TEMPERATURE: 97.9 F | OXYGEN SATURATION: 99 %

## 2024-04-14 DIAGNOSIS — E87.6 HYPOKALEMIA: ICD-10-CM

## 2024-04-14 DIAGNOSIS — R00.2 PALPITATIONS: Primary | ICD-10-CM

## 2024-04-14 LAB
ALBUMIN SERPL BCP-MCNC: 3.8 G/DL (ref 3.4–5)
ALP SERPL-CCNC: 46 U/L (ref 33–136)
ALT SERPL W P-5'-P-CCNC: 15 U/L (ref 7–45)
ANION GAP SERPL CALC-SCNC: 11 MMOL/L (ref 10–20)
APTT PPP: 32 SECONDS (ref 27–38)
AST SERPL W P-5'-P-CCNC: 16 U/L (ref 9–39)
ATRIAL RATE: 65 BPM
BASOPHILS # BLD AUTO: 0.07 X10*3/UL (ref 0–0.1)
BASOPHILS NFR BLD AUTO: 0.9 %
BILIRUB SERPL-MCNC: 0.3 MG/DL (ref 0–1.2)
BUN SERPL-MCNC: 21 MG/DL (ref 6–23)
CALCIUM SERPL-MCNC: 8.9 MG/DL (ref 8.6–10.3)
CARDIAC TROPONIN I PNL SERPL HS: 10 NG/L (ref 0–13)
CARDIAC TROPONIN I PNL SERPL HS: 18 NG/L (ref 0–13)
CHLORIDE SERPL-SCNC: 107 MMOL/L (ref 98–107)
CO2 SERPL-SCNC: 26 MMOL/L (ref 21–32)
CREAT SERPL-MCNC: 0.9 MG/DL (ref 0.5–1.05)
EGFRCR SERPLBLD CKD-EPI 2021: 71 ML/MIN/1.73M*2
EOSINOPHIL # BLD AUTO: 0.26 X10*3/UL (ref 0–0.7)
EOSINOPHIL NFR BLD AUTO: 3.4 %
ERYTHROCYTE [DISTWIDTH] IN BLOOD BY AUTOMATED COUNT: 16.5 % (ref 11.5–14.5)
GLUCOSE SERPL-MCNC: 100 MG/DL (ref 74–99)
HCT VFR BLD AUTO: 32.6 % (ref 36–46)
HGB BLD-MCNC: 9.9 G/DL (ref 12–16)
IMM GRANULOCYTES # BLD AUTO: 0.03 X10*3/UL (ref 0–0.7)
IMM GRANULOCYTES NFR BLD AUTO: 0.4 % (ref 0–0.9)
INR PPP: 1 (ref 0.9–1.1)
LACTATE SERPL-SCNC: 0.9 MMOL/L (ref 0.4–2)
LYMPHOCYTES # BLD AUTO: 1.71 X10*3/UL (ref 1.2–4.8)
LYMPHOCYTES NFR BLD AUTO: 22.2 %
MAGNESIUM SERPL-MCNC: 1.51 MG/DL (ref 1.6–2.4)
MCH RBC QN AUTO: 25.1 PG (ref 26–34)
MCHC RBC AUTO-ENTMCNC: 30.4 G/DL (ref 32–36)
MCV RBC AUTO: 83 FL (ref 80–100)
MONOCYTES # BLD AUTO: 0.48 X10*3/UL (ref 0.1–1)
MONOCYTES NFR BLD AUTO: 6.2 %
NEUTROPHILS # BLD AUTO: 5.17 X10*3/UL (ref 1.2–7.7)
NEUTROPHILS NFR BLD AUTO: 66.9 %
NRBC BLD-RTO: 0 /100 WBCS (ref 0–0)
P AXIS: 31 DEGREES
P OFFSET: 196 MS
P ONSET: 152 MS
PLATELET # BLD AUTO: 198 X10*3/UL (ref 150–450)
POTASSIUM SERPL-SCNC: 3.4 MMOL/L (ref 3.5–5.3)
PR INTERVAL: 124 MS
PROT SERPL-MCNC: 7.1 G/DL (ref 6.4–8.2)
PROTHROMBIN TIME: 11.4 SECONDS (ref 9.8–12.8)
Q ONSET: 214 MS
QRS COUNT: 11 BEATS
QRS DURATION: 84 MS
QT INTERVAL: 452 MS
QTC CALCULATION(BAZETT): 470 MS
QTC FREDERICIA: 464 MS
R AXIS: -38 DEGREES
RBC # BLD AUTO: 3.94 X10*6/UL (ref 4–5.2)
SODIUM SERPL-SCNC: 141 MMOL/L (ref 136–145)
T AXIS: 48 DEGREES
T OFFSET: 440 MS
TSH SERPL-ACNC: 3.57 MIU/L (ref 0.44–3.98)
VENTRICULAR RATE: 65 BPM
WBC # BLD AUTO: 7.7 X10*3/UL (ref 4.4–11.3)

## 2024-04-14 PROCEDURE — 83605 ASSAY OF LACTIC ACID: CPT | Performed by: STUDENT IN AN ORGANIZED HEALTH CARE EDUCATION/TRAINING PROGRAM

## 2024-04-14 PROCEDURE — 84443 ASSAY THYROID STIM HORMONE: CPT | Performed by: STUDENT IN AN ORGANIZED HEALTH CARE EDUCATION/TRAINING PROGRAM

## 2024-04-14 PROCEDURE — 36415 COLL VENOUS BLD VENIPUNCTURE: CPT | Performed by: STUDENT IN AN ORGANIZED HEALTH CARE EDUCATION/TRAINING PROGRAM

## 2024-04-14 PROCEDURE — 71045 X-RAY EXAM CHEST 1 VIEW: CPT | Performed by: SURGERY

## 2024-04-14 PROCEDURE — 85730 THROMBOPLASTIN TIME PARTIAL: CPT | Performed by: STUDENT IN AN ORGANIZED HEALTH CARE EDUCATION/TRAINING PROGRAM

## 2024-04-14 PROCEDURE — 85610 PROTHROMBIN TIME: CPT | Performed by: STUDENT IN AN ORGANIZED HEALTH CARE EDUCATION/TRAINING PROGRAM

## 2024-04-14 PROCEDURE — 84484 ASSAY OF TROPONIN QUANT: CPT | Performed by: STUDENT IN AN ORGANIZED HEALTH CARE EDUCATION/TRAINING PROGRAM

## 2024-04-14 PROCEDURE — 85025 COMPLETE CBC W/AUTO DIFF WBC: CPT | Performed by: STUDENT IN AN ORGANIZED HEALTH CARE EDUCATION/TRAINING PROGRAM

## 2024-04-14 PROCEDURE — 83735 ASSAY OF MAGNESIUM: CPT | Performed by: STUDENT IN AN ORGANIZED HEALTH CARE EDUCATION/TRAINING PROGRAM

## 2024-04-14 PROCEDURE — 71045 X-RAY EXAM CHEST 1 VIEW: CPT

## 2024-04-14 PROCEDURE — 93005 ELECTROCARDIOGRAM TRACING: CPT

## 2024-04-14 PROCEDURE — 99283 EMERGENCY DEPT VISIT LOW MDM: CPT | Mod: 25

## 2024-04-14 PROCEDURE — 80053 COMPREHEN METABOLIC PANEL: CPT | Performed by: STUDENT IN AN ORGANIZED HEALTH CARE EDUCATION/TRAINING PROGRAM

## 2024-04-14 PROCEDURE — 2500000004 HC RX 250 GENERAL PHARMACY W/ HCPCS (ALT 636 FOR OP/ED): Performed by: STUDENT IN AN ORGANIZED HEALTH CARE EDUCATION/TRAINING PROGRAM

## 2024-04-14 PROCEDURE — 2500000002 HC RX 250 W HCPCS SELF ADMINISTERED DRUGS (ALT 637 FOR MEDICARE OP, ALT 636 FOR OP/ED): Performed by: STUDENT IN AN ORGANIZED HEALTH CARE EDUCATION/TRAINING PROGRAM

## 2024-04-14 RX ORDER — LANOLIN ALCOHOL/MO/W.PET/CERES
400 CREAM (GRAM) TOPICAL DAILY
Status: DISCONTINUED | OUTPATIENT
Start: 2024-04-14 | End: 2024-04-14 | Stop reason: HOSPADM

## 2024-04-14 RX ORDER — POTASSIUM CHLORIDE 20 MEQ/1
40 TABLET, EXTENDED RELEASE ORAL ONCE
Status: COMPLETED | OUTPATIENT
Start: 2024-04-14 | End: 2024-04-14

## 2024-04-14 RX ORDER — POTASSIUM CHLORIDE 750 MG/1
20 TABLET, FILM COATED, EXTENDED RELEASE ORAL DAILY
Qty: 20 TABLET | Refills: 0 | Status: SHIPPED | OUTPATIENT
Start: 2024-04-14 | End: 2024-04-24

## 2024-04-14 RX ADMIN — POTASSIUM CHLORIDE 40 MEQ: 1500 TABLET, EXTENDED RELEASE ORAL at 02:55

## 2024-04-14 RX ADMIN — MAGNESIUM OXIDE 400 MG (241.3 MG MAGNESIUM) TABLET 400 MG: TABLET at 03:29

## 2024-04-14 ASSESSMENT — PAIN - FUNCTIONAL ASSESSMENT: PAIN_FUNCTIONAL_ASSESSMENT: 0-10

## 2024-04-14 ASSESSMENT — PAIN SCALES - GENERAL: PAINLEVEL_OUTOF10: 0 - NO PAIN

## 2024-04-14 NOTE — ED PROVIDER NOTES
"HPI   Chief Complaint   Patient presents with    Rapid Heart Rate     It feels like my heart is racing, my blood pressure was really high at home, I just had a heart attack in October       Patient presents to the emergency department for evaluation of palpitations and racing heart rate.  She states \"I check my blood pressure at that time and it was very high like over 200.  This has happened to me before my heart rate gets fast like this.  I had stents put in before \".                          [place] Coma Scale Score: 15                     Patient History   Past Medical History:   Diagnosis Date    Hyperlipidemia      Past Surgical History:   Procedure Laterality Date    CARDIAC CATHETERIZATION N/A 10/2/2023    Procedure: PCI CARLOS ALBERTO Stent- Coronary;  Surgeon: Nish Parnell MD;  Location: [facility];  Service: Cardiovascular;  Laterality: N/A;  RCA PCI. Please schedule to follow my 2nd case of the afternoon    CARDIAC CATHETERIZATION N/A 10/2/2023    Procedure: IVUS - Coronary;  Surgeon: Nish Parnell MD;  Location: [facility];  Service: Cardiovascular;  Laterality: N/A;    CARDIAC CATHETERIZATION N/A 10/1/2023    Procedure: Left Heart Cath, No LV;  Surgeon: Nish Parnell MD;  Location: [facility];  Service: Cardiovascular;  Laterality: N/A;    CARDIAC CATHETERIZATION N/A 10/1/2023    Procedure: PCI;  Surgeon: Nish Parnell MD;  Location: [facility];  Service: Cardiovascular;  Laterality: N/A;    CARDIAC CATHETERIZATION N/A 10/6/2023    Procedure: Left Heart Cath;  Surgeon: Nish Parnell MD;  Location: [facility];  Service: Cardiovascular;  Laterality: N/A;    CARDIAC CATHETERIZATION N/A 10/6/2023    Procedure: PCI CARLOS ALBERTO Stent- Coronary;  Surgeon: Nish Parnell MD;  Location: [facility];  Service: Cardiovascular;  Laterality: N/A;  RCA PCI with Shockwave. Please schedule at 9 " am if possible    CARDIAC CATHETERIZATION N/A 10/6/2023    Procedure: IVUS - Coronary;  Surgeon: Nish Parnell MD;  Location: [facility];  Service: Cardiovascular;  Laterality: N/A;    CORONARY ANGIOPLASTY WITH STENT PLACEMENT      US ASPIRATION INJECTION INTERMEDIATE JOINT  2019    US ASPIRATION INJECTION INTERMEDIATE JOINT 2019 [facility]    US GUIDED THYROID BIOPSY  2019    US GUIDED THYROID BIOPSY 2019 [facility]     Family History   Problem Relation Name Age of Onset    Heart attack Father  65    Heart disease Paternal Grandmother  65     Social History     Tobacco Use    Smoking status: Former     Current packs/day: 0.00     Average packs/day: 1 pack/day for 15.0 years (15.0 ttl pk-yrs)     Types: Cigarettes     Start date: 2008     Quit date: 2023     Years since quittin.5    Smokeless tobacco: Never   Vaping Use    Vaping status: Never Used   Substance Use Topics    Alcohol use: Never    Drug use: Never       Physical Exam   ED Triage Vitals [24 0107]   Temperature Heart Rate Respirations BP   36.6 °C (97.9 °F) 90 18 (!) 219/94      Pulse Ox Temp Source Heart Rate Source Patient Position   98 % Temporal Monitor Sitting      BP Location FiO2 (%)     Right arm --       Physical Exam    ED Course & MDM        Medical Decision Making  Patient had cardiac catheterization repeat in .  CONCLUSIONS:   1. Coronary angiogram performed for complaints of chest discomfort in post procedure area after PCI.   2. Patent coronary stents with RUBI 3 flow in all vessels and no residual significant stenosis.    Patient's initial EKG interpreted by myself shows a sinus rhythm with heart rate 80, , QRS 80 and QTc 461.  Shows left axis deviation.  No STEMI criteria noted.  Unchanged from previous EKG from approximately 2 months ago    No significant delta change noted on the troponins.  Initial was 10 and repeat is 18.  Patient was  significantly hypertensive when she first arrived to triage with blood pressure 219/94 but it subsequently improved down to 127/60.  Patient also states that there may be a component of anxiety related to this.  Hypomagnesemia at 1.51 and given additional magnesium oxide 400 mg in the ED.  Mild hypokalemia at 3.4.  Given 40 mill equivalents of potassium orally in the ED  I advised the patient to increase her potassium supplementation from 10 mill equivalents to 20 mill colons for the next 10 days and then to go back to her baseline 10 mill equivalents.  Advised her to continue her magnesium oral supplementation.  Lactic acid unremarkable.  Baseline anemia of 9.9 that is unchanged.  Normal white blood cell count.  Renal function and LFTs unremarkable as well.    Patient has had no palpitations while in the ER.  She feels comfortable being discharged and following up with her cardiologist as needed.    Repeat EKG interpreted by myself shows persistent sinus rhythm with heart rate 65, , QRS 84 and QTc 470 with left axis deviation and no new T wave inversions.  No STEMI criteria noted.    Procedure  Procedures     Jaycob De Los Santos MD  04/14/24 0358

## 2024-04-14 NOTE — DISCHARGE INSTRUCTIONS
I increased your potassium supplementation to 2 pills once a day for the next 10 days and then you may go back down to 1 pill a day.  Continue your magnesium supplement as indicated.  Follow-up with your cardiologist.

## 2024-04-15 ENCOUNTER — APPOINTMENT (OUTPATIENT)
Dept: CARDIAC REHAB | Facility: HOSPITAL | Age: 64
End: 2024-04-15
Payer: COMMERCIAL

## 2024-04-15 LAB
ATRIAL RATE: 80 BPM
P AXIS: 19 DEGREES
P OFFSET: 194 MS
P ONSET: 145 MS
PR INTERVAL: 130 MS
Q ONSET: 210 MS
QRS COUNT: 14 BEATS
QRS DURATION: 80 MS
QT INTERVAL: 400 MS
QTC CALCULATION(BAZETT): 461 MS
QTC FREDERICIA: 440 MS
R AXIS: -49 DEGREES
T AXIS: 44 DEGREES
T OFFSET: 410 MS
VENTRICULAR RATE: 80 BPM

## 2024-04-16 ENCOUNTER — OFFICE VISIT (OUTPATIENT)
Dept: ORTHOPEDIC SURGERY | Facility: CLINIC | Age: 64
End: 2024-04-16
Payer: COMMERCIAL

## 2024-04-16 ENCOUNTER — HOSPITAL ENCOUNTER (OUTPATIENT)
Dept: RADIOLOGY | Facility: EXTERNAL LOCATION | Age: 64
Discharge: HOME | End: 2024-04-16

## 2024-04-16 DIAGNOSIS — M25.561 RIGHT KNEE PAIN, UNSPECIFIED CHRONICITY: ICD-10-CM

## 2024-04-16 DIAGNOSIS — D50.9 IRON DEFICIENCY ANEMIA, UNSPECIFIED IRON DEFICIENCY ANEMIA TYPE: ICD-10-CM

## 2024-04-16 DIAGNOSIS — M17.11 PRIMARY LOCALIZED OSTEOARTHRITIS OF RIGHT KNEE: Primary | ICD-10-CM

## 2024-04-16 PROCEDURE — 1160F RVW MEDS BY RX/DR IN RCRD: CPT | Performed by: INTERNAL MEDICINE

## 2024-04-16 PROCEDURE — 1159F MED LIST DOCD IN RCRD: CPT | Performed by: INTERNAL MEDICINE

## 2024-04-16 PROCEDURE — 3008F BODY MASS INDEX DOCD: CPT | Performed by: INTERNAL MEDICINE

## 2024-04-16 PROCEDURE — 20611 DRAIN/INJ JOINT/BURSA W/US: CPT | Mod: RT | Performed by: INTERNAL MEDICINE

## 2024-04-16 PROCEDURE — 1123F ACP DISCUSS/DSCN MKR DOCD: CPT | Performed by: INTERNAL MEDICINE

## 2024-04-16 PROCEDURE — 2500000004 HC RX 250 GENERAL PHARMACY W/ HCPCS (ALT 636 FOR OP/ED): Mod: JZ | Performed by: INTERNAL MEDICINE

## 2024-04-16 RX ORDER — FERROUS SULFATE 325(65) MG
325 TABLET, DELAYED RELEASE (ENTERIC COATED) ORAL
Qty: 45 TABLET | Refills: 3 | Status: SHIPPED | OUTPATIENT
Start: 2024-04-16

## 2024-04-16 RX ADMIN — Medication 2 ML: at 18:48

## 2024-04-16 NOTE — PROGRESS NOTES
CC:   Chief Complaint   Patient presents with    Right Knee - Pain     Rt knee gel synvisc #2       HPI: Audrey is a 63 y.o. female  presents for her second Synvisc injection to the right knee for knee osteoarthritis.         Review of Systems   GENERAL: Negative for malaise, significant weight loss, fever  MUSCULOSKELETAL: See HPI  NEURO:  Negative for numbness / tingling     Past Medical History  Past Medical History:   Diagnosis Date    Hyperlipidemia        Medication review  Medication Documentation Review Audit       Reviewed by Ashleigh Webb (Student Nurse Practitioner) on 04/14/24 at 0250      Medication Order Taking? Sig Documenting Provider Last Dose Status   amLODIPine (Norvasc) 5 mg tablet [id number]  Take one tablet as needed once a day if SBP more then 160 and DBP more then 90 mm Hg Cam Araya MD  Active   aspirin 81 mg EC tablet [id number]  Take 1 tablet (81 mg) by mouth once daily. Jay Chester, APRN-CNP  Active   atorvastatin (Lipitor) 80 mg tablet [id number]  Take 1 tablet (80 mg) by mouth once daily. Nish Parnell MD  Active   carvedilol (Coreg) 25 mg tablet [id number]  Take 1 tablet (25 mg) by mouth 2 times a day. Nish Parnell MD  Active   cholecalciferol (Vitamin D-3) 25 MCG (1000 UT) capsule [id number]  Take 1 capsule (25 mcg) by mouth once daily. Walker Dahl MD  Active   escitalopram (Lexapro) 10 mg tablet [id number]  Take 1 tablet (10 mg) by mouth once daily. Cam Araya MD  Active   ferrous sulfate 325 (65 Fe) MG EC tablet [id number]  Take 65 mg by mouth once daily. Take on Mon, Wed, and Fri Walker Dahl MD  Active   losartan (Cozaar) 100 mg tablet [id number]  Take 1 tablet (100 mg) by mouth once daily. Nish Parnell MD  Active   magnesium oxide (MagOx) 400 mg (241.3 mg magnesium) tablet [id number]  Take 1 tablet (400 mg) by mouth once daily. Cam Araya MD  Active   omega-3 acid ethyl esters (Lovaza) 1 gram capsule  [id number]  TAKE 1 CAPSULE BY MOUTH TWICE A DAY Cam Araya MD  Active   omeprazole OTC (PriLOSEC OTC) 20 mg EC tablet [id number]  Take 1 mg by mouth once daily in the morning. Take before meals. Do not crush, chew, or split. Walker Dahl MD  Active   potassium chloride CR (Klor-Con) 10 mEq ER tablet [id number]  Take 1 tablet (10 mEq) by mouth once daily. Do not crush, chew, or split. Cam Araya MD  Active   ticagrelor (Brilinta) 90 mg tablet [id number]  Take 1 tablet (90 mg) by mouth 2 times a day. Nish Parnell MD  Active                    Allergies  No Known Allergies    Social History  Social History     Socioeconomic History    Marital status:      Spouse name: Not on file    Number of children: Not on file    Years of education: Not on file    Highest education level: Not on file   Occupational History    Not on file   Tobacco Use    Smoking status: Former     Current packs/day: 0.00     Average packs/day: 1 pack/day for 15.0 years (15.0 ttl pk-yrs)     Types: Cigarettes     Start date: 2008     Quit date: 2023     Years since quittin.5    Smokeless tobacco: Never   Vaping Use    Vaping status: Never Used   Substance and Sexual Activity    Alcohol use: Never    Drug use: Never    Sexual activity: Defer   Other Topics Concern    Not on file   Social History Narrative    Not on file     Social Determinants of Health     Financial Resource Strain: Low Risk  (2024)    Overall Financial Resource Strain (CARDIA)     Difficulty of Paying Living Expenses: Not very hard   Food Insecurity: No Food Insecurity (10/2/2023)    Hunger Vital Sign     Worried About Running Out of Food in the Last Year: Never true     Ran Out of Food in the Last Year: Never true   Transportation Needs: No Transportation Needs (2024)    PRAPARE - Transportation     Lack of Transportation (Medical): No     Lack of Transportation (Non-Medical): No   Physical Activity: Sufficiently Active  (10/2/2023)    Exercise Vital Sign     Days of Exercise per Week: 5 days     Minutes of Exercise per Session: 30 min   Stress: Stress Concern Present (10/2/2023)    [employer] [place] [employer] - Occupational Stress Questionnaire     Feeling of Stress : To some extent   Social Connections: Moderately Isolated (10/2/2023)    Social Connection and Isolation Panel [NHANES]     Frequency of Communication with Friends and Family: More than three times a week     Frequency of Social Gatherings with Friends and Family: Once a week     Attends Mormonism Services: Never     Active Member of Clubs or Organizations: No     Attends Club or Organization Meetings: Never     Marital Status:    Intimate Partner Violence: Not At Risk (10/2/2023)    Humiliation, Afraid, Rape, and Kick questionnaire     Fear of Current or Ex-Partner: No     Emotionally Abused: No     Physically Abused: No     Sexually Abused: No   Housing Stability: Low Risk  (2/8/2024)    Housing Stability Vital Sign     Unable to Pay for Housing in the Last Year: No     Number of Places Lived in the Last Year: 0     Unstable Housing in the Last Year: No       Surgical History  Past Surgical History:   Procedure Laterality Date    CARDIAC CATHETERIZATION N/A 10/2/2023    Procedure: PCI CARLOS ALBERTO Stent- Coronary;  Surgeon: Nish Parnell MD;  Location: [facility];  Service: Cardiovascular;  Laterality: N/A;  RCA PCI. Please schedule to follow my 2nd case of the afternoon    CARDIAC CATHETERIZATION N/A 10/2/2023    Procedure: IVUS - Coronary;  Surgeon: Nish Parnell MD;  Location: [facility];  Service: Cardiovascular;  Laterality: N/A;    CARDIAC CATHETERIZATION N/A 10/1/2023    Procedure: Left Heart Cath, No LV;  Surgeon: Nish Parnell MD;  Location: [facility];  Service: Cardiovascular;  Laterality: N/A;    CARDIAC CATHETERIZATION N/A 10/1/2023    Procedure: PCI;  Surgeon: Nish CLARK  Vaughn Parnell MD;  Location: [facility];  Service: Cardiovascular;  Laterality: N/A;    CARDIAC CATHETERIZATION N/A 10/6/2023    Procedure: Left Heart Cath;  Surgeon: Nish Parnell MD;  Location: [facility];  Service: Cardiovascular;  Laterality: N/A;    CARDIAC CATHETERIZATION N/A 10/6/2023    Procedure: PCI CARLOS ALBERTO Stent- Coronary;  Surgeon: Nish Parnell MD;  Location: [facility];  Service: Cardiovascular;  Laterality: N/A;  RCA PCI with Shockwave. Please schedule at 9 am if possible    CARDIAC CATHETERIZATION N/A 10/6/2023    Procedure: IVUS - Coronary;  Surgeon: Nish Parnell MD;  Location: [facility];  Service: Cardiovascular;  Laterality: N/A;    CORONARY ANGIOPLASTY WITH STENT PLACEMENT      US ASPIRATION INJECTION INTERMEDIATE JOINT  12/23/2019    US ASPIRATION INJECTION INTERMEDIATE JOINT 12/23/2019 [facility]    US GUIDED THYROID BIOPSY  12/13/2019    US GUIDED THYROID BIOPSY 12/13/2019 [facility]       Physical Exam:  GENERAL:  Patient is awake, alert, and oriented to person place and time.  Patient appears well nourished and well kept.  Affect Calm, Not Acutely Distressed.  HEENT:  Normocephalic, Atraumatic, EOMI  CARDIOVASCULAR:  Hemodynamically stable.  RESPIRATORY:  Normal respirations with unlabored breathing.  Extremity:  Right knee skin is intact.  No erythema or warmth.  No clinical signs of infection       Diagnostics: None today  Point of Care Ultrasound  These images are not reportable by radiology and will not be interpreted   by  Radiologists.        Procedure: L Inj/Asp: R knee on 4/16/2024 6:48 PM  Indications: pain  Details: 22 G needle, ultrasound-guided lateral approach  Medications: 2 mL hylan 16 mg/2 mL  Outcome: tolerated well, no immediate complications  Procedure, treatment alternatives, risks and benefits explained, specific risks discussed. Consent was given by the patient.  Immediately prior to procedure a time out was called to verify the correct patient, procedure, equipment, support staff and site/side marked as required. Patient was prepped and draped in the usual sterile fashion.             Assessment: Right knee osteoarthritis     Plan: Audrey presents today for her second ultrasound-guided Synvisc injection to the right knee for knee osteoarthritis. She tolerated her injection today. She will follow-up next week for her third Synvisc injection     Orders Placed This Encounter    Point of Care Ultrasound      At the conclusion of the visit there were no further questions by the patient/family regarding their plan of care.  Patient was instructed to call or return with any issues, questions, or concerns regarding their injury and/or treatment plan described above.     04/16/24 at 5:42 PM - Jannette Desouza MD  Scribe Attestation  By signing my name below, I, Palomo Epperson, Scribe   attest that this documentation has been prepared under the direction and in the presence of Jannette Desouza MD.    Office: [phone]    This note was prepared using voice recognition software.  The details of this note are correct and have been reviewed, and corrected to the best of my ability.  Some grammatical errors may persist related to the Dragon software.

## 2024-04-18 ENCOUNTER — PATIENT OUTREACH (OUTPATIENT)
Dept: CARDIOLOGY | Facility: CLINIC | Age: 64
End: 2024-04-18
Payer: COMMERCIAL

## 2024-04-18 ENCOUNTER — APPOINTMENT (OUTPATIENT)
Dept: CARDIAC REHAB | Facility: HOSPITAL | Age: 64
End: 2024-04-18
Payer: COMMERCIAL

## 2024-04-23 ENCOUNTER — TELEPHONE (OUTPATIENT)
Dept: CARDIOLOGY | Facility: CLINIC | Age: 64
End: 2024-04-23

## 2024-04-23 ENCOUNTER — OFFICE VISIT (OUTPATIENT)
Dept: ORTHOPEDIC SURGERY | Facility: CLINIC | Age: 64
End: 2024-04-23
Payer: COMMERCIAL

## 2024-04-23 ENCOUNTER — HOSPITAL ENCOUNTER (OUTPATIENT)
Dept: RADIOLOGY | Facility: EXTERNAL LOCATION | Age: 64
Discharge: HOME | End: 2024-04-23

## 2024-04-23 DIAGNOSIS — M25.561 RIGHT KNEE PAIN, UNSPECIFIED CHRONICITY: ICD-10-CM

## 2024-04-23 DIAGNOSIS — M17.11 PRIMARY LOCALIZED OSTEOARTHRITIS OF RIGHT KNEE: Primary | ICD-10-CM

## 2024-04-23 PROCEDURE — 2500000004 HC RX 250 GENERAL PHARMACY W/ HCPCS (ALT 636 FOR OP/ED): Mod: JZ | Performed by: INTERNAL MEDICINE

## 2024-04-23 PROCEDURE — 1123F ACP DISCUSS/DSCN MKR DOCD: CPT | Performed by: INTERNAL MEDICINE

## 2024-04-23 PROCEDURE — 20611 DRAIN/INJ JOINT/BURSA W/US: CPT | Mod: RT | Performed by: INTERNAL MEDICINE

## 2024-04-23 PROCEDURE — 1160F RVW MEDS BY RX/DR IN RCRD: CPT | Performed by: INTERNAL MEDICINE

## 2024-04-23 PROCEDURE — 1159F MED LIST DOCD IN RCRD: CPT | Performed by: INTERNAL MEDICINE

## 2024-04-23 PROCEDURE — 3008F BODY MASS INDEX DOCD: CPT | Performed by: INTERNAL MEDICINE

## 2024-04-23 RX ADMIN — Medication 2 ML: at 17:49

## 2024-04-23 NOTE — TELEPHONE ENCOUNTER
Audrey is scheduled to have a is deep cleaning and extraction of a tooth. The dental office would like clearance and to know of she should hold the Brilinta and how many days prior? Please advise.       Fax # [phone]

## 2024-04-23 NOTE — PROGRESS NOTES
CC:   Chief Complaint   Patient presents with    Right Knee - Pain     Rt knee gel synvisc #3       HPI: Audrey is a 63 y.o. female   presents for her third Synvisc injection to the right knee for knee osteoarthritis         Review of Systems   GENERAL: Negative for malaise, significant weight loss, fever  MUSCULOSKELETAL: See HPI  NEURO:  Negative for numbness / tingling     Past Medical History  Past Medical History:   Diagnosis Date    Hyperlipidemia        Medication review  Medication Documentation Review Audit       Reviewed by Ashleigh Webb (Student Nurse Practitioner) on 04/14/24 at 0250      Medication Order Taking? Sig Documenting Provider Last Dose Status   amLODIPine (Norvasc) 5 mg tablet [id number]  Take one tablet as needed once a day if SBP more then 160 and DBP more then 90 mm Hg Cam Araya MD  Active   aspirin 81 mg EC tablet [id number]  Take 1 tablet (81 mg) by mouth once daily. Jay Chester, APRN-CNP  Active   atorvastatin (Lipitor) 80 mg tablet [id number]  Take 1 tablet (80 mg) by mouth once daily. Nish Parnell MD  Active   carvedilol (Coreg) 25 mg tablet [id number]  Take 1 tablet (25 mg) by mouth 2 times a day. Nish Parnell MD  Active   cholecalciferol (Vitamin D-3) 25 MCG (1000 UT) capsule [id number]  Take 1 capsule (25 mcg) by mouth once daily. Walker Dahl MD  Active   escitalopram (Lexapro) 10 mg tablet [id number]  Take 1 tablet (10 mg) by mouth once daily. Cam Araya MD  Active   ferrous sulfate 325 (65 Fe) MG EC tablet [id number]  Take 65 mg by mouth once daily. Take on Mon, Wed, and Fri Walker Dahl MD  Active   losartan (Cozaar) 100 mg tablet [id number]  Take 1 tablet (100 mg) by mouth once daily. Nish Parnell MD  Active   magnesium oxide (MagOx) 400 mg (241.3 mg magnesium) tablet [id number]  Take 1 tablet (400 mg) by mouth once daily. Cam Araya MD  Active   omega-3 acid ethyl esters (Lovaza) 1 gram capsule  [id number]  TAKE 1 CAPSULE BY MOUTH TWICE A DAY Cam Araya MD  Active   omeprazole OTC (PriLOSEC OTC) 20 mg EC tablet [id number]  Take 1 mg by mouth once daily in the morning. Take before meals. Do not crush, chew, or split. Walker Dahl MD  Active   potassium chloride CR (Klor-Con) 10 mEq ER tablet [id number]  Take 1 tablet (10 mEq) by mouth once daily. Do not crush, chew, or split. Cam Araya MD  Active   ticagrelor (Brilinta) 90 mg tablet [id number]  Take 1 tablet (90 mg) by mouth 2 times a day. Nish Parnell MD  Active                    Allergies  No Known Allergies    Social History  Social History     Socioeconomic History    Marital status:      Spouse name: Not on file    Number of children: Not on file    Years of education: Not on file    Highest education level: Not on file   Occupational History    Not on file   Tobacco Use    Smoking status: Former     Current packs/day: 0.00     Average packs/day: 1 pack/day for 15.0 years (15.0 ttl pk-yrs)     Types: Cigarettes     Start date: 2008     Quit date: 2023     Years since quittin.5    Smokeless tobacco: Never   Vaping Use    Vaping status: Never Used   Substance and Sexual Activity    Alcohol use: Never    Drug use: Never    Sexual activity: Defer   Other Topics Concern    Not on file   Social History Narrative    Not on file     Social Determinants of Health     Financial Resource Strain: Low Risk  (2024)    Overall Financial Resource Strain (CARDIA)     Difficulty of Paying Living Expenses: Not very hard   Food Insecurity: No Food Insecurity (10/2/2023)    Hunger Vital Sign     Worried About Running Out of Food in the Last Year: Never true     Ran Out of Food in the Last Year: Never true   Transportation Needs: No Transportation Needs (2024)    PRAPARE - Transportation     Lack of Transportation (Medical): No     Lack of Transportation (Non-Medical): No   Physical Activity: Sufficiently Active  (10/2/2023)    Exercise Vital Sign     Days of Exercise per Week: 5 days     Minutes of Exercise per Session: 30 min   Stress: Stress Concern Present (10/2/2023)    [employer] - Occupational Stress Questionnaire     Feeling of Stress : To some extent   Social Connections: Moderately Isolated (10/2/2023)    Social Connection and Isolation Panel [NHANES]     Frequency of Communication with Friends and Family: More than three times a week     Frequency of Social Gatherings with Friends and Family: Once a week     Attends Jew Services: Never     Active Member of Clubs or Organizations: No     Attends Club or Organization Meetings: Never     Marital Status:    Intimate Partner Violence: Not At Risk (10/2/2023)    Humiliation, Afraid, Rape, and Kick questionnaire     Fear of Current or Ex-Partner: No     Emotionally Abused: No     Physically Abused: No     Sexually Abused: No   Housing Stability: Low Risk  (2/8/2024)    Housing Stability Vital Sign     Unable to Pay for Housing in the Last Year: No     Number of Places Lived in the Last Year: 0     Unstable Housing in the Last Year: No       Surgical History  Past Surgical History:   Procedure Laterality Date    CARDIAC CATHETERIZATION N/A 10/2/2023    Procedure: PCI CARLOS ALBERTO Stent- Coronary;  Surgeon: Nish Parnell MD;  Location: [facility];  Service: Cardiovascular;  Laterality: N/A;  RCA PCI. Please schedule to follow my 2nd case of the afternoon    CARDIAC CATHETERIZATION N/A 10/2/2023    Procedure: IVUS - Coronary;  Surgeon: Nish Parnell MD;  Location: [facility];  Service: Cardiovascular;  Laterality: N/A;    CARDIAC CATHETERIZATION N/A 10/1/2023    Procedure: Left Heart Cath, No LV;  Surgeon: Nish Parnell MD;  Location: [facility];  Service: Cardiovascular;  Laterality: N/A;    CARDIAC CATHETERIZATION N/A 10/1/2023    Procedure: PCI;  Surgeon: Nish CLARK  Vaughn Parnell MD;  Location: [facility];  Service: Cardiovascular;  Laterality: N/A;    CARDIAC CATHETERIZATION N/A 10/6/2023    Procedure: Left Heart Cath;  Surgeon: Nish Parnell MD;  Location: [facility];  Service: Cardiovascular;  Laterality: N/A;    CARDIAC CATHETERIZATION N/A 10/6/2023    Procedure: PCI CARLOS ALBERTO Stent- Coronary;  Surgeon: Nish Parnell MD;  Location: [facility];  Service: Cardiovascular;  Laterality: N/A;  RCA PCI with Shockwave. Please schedule at 9 am if possible    CARDIAC CATHETERIZATION N/A 10/6/2023    Procedure: IVUS - Coronary;  Surgeon: Nish Parnell MD;  Location: [facility];  Service: Cardiovascular;  Laterality: N/A;    CORONARY ANGIOPLASTY WITH STENT PLACEMENT      US ASPIRATION INJECTION INTERMEDIATE JOINT  12/23/2019    US ASPIRATION INJECTION INTERMEDIATE JOINT 12/23/2019 [facility]    US GUIDED THYROID BIOPSY  12/13/2019    US GUIDED THYROID BIOPSY 12/13/2019 [facility]       Physical Exam:  GENERAL:  Patient is awake, alert, and oriented to person place and time.  Patient appears well nourished and well kept.  Affect Calm, Not Acutely Distressed.  HEENT:  Normocephalic, Atraumatic, EOMI  CARDIOVASCULAR:  Hemodynamically stable.  RESPIRATORY:  Normal respirations with unlabored breathing.  Extremity:  Right knee skin is intact.  No erythema or warmth.  No clinical signs of infection       Diagnostics: None today   Point of Care Ultrasound  These images are not reportable by radiology and will not be interpreted   by  Radiologists.        Procedure: L Inj/Asp: R knee on 4/23/2024 5:49 PM  Indications: pain  Details: 22 G needle, ultrasound-guided lateral approach  Medications: 2 mL hylan 16 mg/2 mL  Outcome: tolerated well, no immediate complications  Procedure, treatment alternatives, risks and benefits explained, specific risks discussed. Consent was given by the patient.  Immediately prior to procedure a time out was called to verify the correct patient, procedure, equipment, support staff and site/side marked as required. Patient was prepped and draped in the usual sterile fashion.             Assessment: Right knee osteoarthritis     Plan: Audrey presents today for her third ultrasound-guided Synvisc injection to the right knee for knee osteoarthritis. She tolerated her injection today.  We discussed we could repeat the injection in time after 6 months.  If she gets no relief she may be candidate for knee replacement options.    Orders Placed This Encounter    Point of Care Ultrasound      At the conclusion of the visit there were no further questions by the patient/family regarding their plan of care.  Patient was instructed to call or return with any issues, questions, or concerns regarding their injury and/or treatment plan described above.     04/23/24 at 5:21 PM - Jannette Desouza MD  Scribe Attestation  By signing my name below, I Palomo Espinozamo, Scribe   attest that this documentation has been prepared under the direction and in the presence of Jannette Desouza MD.    Office: [phone]    This note was prepared using voice recognition software.  The details of this note are correct and have been reviewed, and corrected to the best of my ability.  Some grammatical errors may persist related to the Dragon software.

## 2024-04-27 LAB — BODY SURFACE AREA: 1.79 M2

## 2024-05-01 ENCOUNTER — TELEPHONE (OUTPATIENT)
Dept: CARDIOLOGY | Facility: HOSPITAL | Age: 64
End: 2024-05-01
Payer: COMMERCIAL

## 2024-05-01 NOTE — TELEPHONE ENCOUNTER
----- Message from Nish Parnell MD sent at 4/30/2024 12:21 PM EDT -----  No evidence of arrhythmias

## 2024-05-06 NOTE — TELEPHONE ENCOUNTER
Called patient and gave results per Dr. Nish Mendes MD, FACC, FSCAI, RPVI from 30 day monitor. Patient verbalized understanding and will keep future scheduled appointments.

## 2024-05-06 NOTE — TELEPHONE ENCOUNTER
Called Pt left a message stating to call back and to keep her follow up as scheduled and I would send a message on [employer]

## 2024-05-06 NOTE — TELEPHONE ENCOUNTER
Per Dr. Nish Mendes MD [facility], patients 30 day monitor did not demonstrate any arrhythmias. Keep future follow ups as scheduled.

## 2024-05-15 DIAGNOSIS — E78.5 HYPERLIPIDEMIA, UNSPECIFIED: ICD-10-CM

## 2024-05-15 DIAGNOSIS — F41.1 GAD (GENERALIZED ANXIETY DISORDER): ICD-10-CM

## 2024-05-16 ENCOUNTER — HOSPITAL ENCOUNTER (OUTPATIENT)
Dept: RADIOLOGY | Facility: CLINIC | Age: 64
Discharge: HOME | End: 2024-05-16
Payer: COMMERCIAL

## 2024-05-16 ENCOUNTER — OFFICE VISIT (OUTPATIENT)
Dept: ORTHOPEDIC SURGERY | Facility: CLINIC | Age: 64
End: 2024-05-16
Payer: COMMERCIAL

## 2024-05-16 DIAGNOSIS — M25.561 RIGHT KNEE PAIN, UNSPECIFIED CHRONICITY: ICD-10-CM

## 2024-05-16 PROCEDURE — 99213 OFFICE O/P EST LOW 20 MIN: CPT | Performed by: INTERNAL MEDICINE

## 2024-05-16 PROCEDURE — 73562 X-RAY EXAM OF KNEE 3: CPT | Mod: RT

## 2024-05-16 PROCEDURE — 1159F MED LIST DOCD IN RCRD: CPT | Performed by: INTERNAL MEDICINE

## 2024-05-16 PROCEDURE — 1160F RVW MEDS BY RX/DR IN RCRD: CPT | Performed by: INTERNAL MEDICINE

## 2024-05-16 PROCEDURE — 3008F BODY MASS INDEX DOCD: CPT | Performed by: INTERNAL MEDICINE

## 2024-05-16 PROCEDURE — 73562 X-RAY EXAM OF KNEE 3: CPT | Mod: RIGHT SIDE | Performed by: INTERNAL MEDICINE

## 2024-05-16 PROCEDURE — 1123F ACP DISCUSS/DSCN MKR DOCD: CPT | Performed by: INTERNAL MEDICINE

## 2024-05-16 RX ORDER — OMEGA-3-ACID ETHYL ESTERS 1 G/1
CAPSULE, LIQUID FILLED ORAL
Qty: 180 CAPSULE | Refills: 0 | Status: SHIPPED | OUTPATIENT
Start: 2024-05-16

## 2024-05-16 RX ORDER — ESCITALOPRAM OXALATE 10 MG/1
10 TABLET ORAL DAILY
Qty: 90 TABLET | Refills: 1 | Status: SHIPPED | OUTPATIENT
Start: 2024-05-16

## 2024-05-16 NOTE — PROGRESS NOTES
CC:   Chief Complaint   Patient presents with    Right Knee - Follow-up     OA  S/p gel inj 4/23/24       HPI: Audrey is a 63 y.o. female presents today for reevaluation for right knee osteoarthritis. She notes worsening right knee pain and swelling.  She still having  pain after the corticosteroid injection and gel injections.  She is also wearing her on the medial unloading OA brace with no relief.      Review of Systems   GENERAL: Negative for malaise, significant weight loss, fever  MUSCULOSKELETAL: See HPI  NEURO:  Negative for numbness / tingling     Past Medical History  Past Medical History:   Diagnosis Date    Hyperlipidemia        Medication review  Medication Documentation Review Audit       Reviewed by Ashleigh Webb (Student Nurse Practitioner) on 04/14/24 at 0250      Medication Order Taking? Sig Documenting Provider Last Dose Status   amLODIPine (Norvasc) 5 mg tablet [id number]  Take one tablet as needed once a day if SBP more then 160 and DBP more then 90 mm Hg Cam Araya MD  Active   aspirin 81 mg EC tablet [id number]  Take 1 tablet (81 mg) by mouth once daily. Jay Chester, APRN-CNP  Active   atorvastatin (Lipitor) 80 mg tablet [id number]  Take 1 tablet (80 mg) by mouth once daily. Nish Parnell MD  Active   carvedilol (Coreg) 25 mg tablet [id number]  Take 1 tablet (25 mg) by mouth 2 times a day. Nish Parnell MD  Active   cholecalciferol (Vitamin D-3) 25 MCG (1000 UT) capsule [id number]  Take 1 capsule (25 mcg) by mouth once daily. Walker Dahl MD  Active   escitalopram (Lexapro) 10 mg tablet [id number]  Take 1 tablet (10 mg) by mouth once daily. Cam Araya MD  Active   ferrous sulfate 325 (65 Fe) MG EC tablet [id number]  Take 65 mg by mouth once daily. Take on Mon, Wed, and Fri Walker Dahl MD  Active   losartan (Cozaar) 100 mg tablet [id number]  Take 1 tablet (100 mg) by mouth once daily. Nish Parnell MD  Active   magnesium  oxide (MagOx) 400 mg (241.3 mg magnesium) tablet [id number]  Take 1 tablet (400 mg) by mouth once daily. Cam Araya MD  Active   omega-3 acid ethyl esters (Lovaza) 1 gram capsule [id number]  TAKE 1 CAPSULE BY MOUTH TWICE A DAY Cam Araya MD  Active   omeprazole OTC (PriLOSEC OTC) 20 mg EC tablet [id number]  Take 1 mg by mouth once daily in the morning. Take before meals. Do not crush, chew, or split. Walker Dahl MD  Active   potassium chloride CR (Klor-Con) 10 mEq ER tablet [id number]  Take 1 tablet (10 mEq) by mouth once daily. Do not crush, chew, or split. Cam Araya MD  Active   ticagrelor (Brilinta) 90 mg tablet [id number]  Take 1 tablet (90 mg) by mouth 2 times a day. Nish Parnell MD  Active                    Allergies  No Known Allergies    Social History  Social History     Socioeconomic History    Marital status:      Spouse name: Not on file    Number of children: Not on file    Years of education: Not on file    Highest education level: Not on file   Occupational History    Not on file   Tobacco Use    Smoking status: Former     Current packs/day: 0.00     Average packs/day: 1 pack/day for 15.0 years (15.0 ttl pk-yrs)     Types: Cigarettes     Start date: 2008     Quit date: 2023     Years since quittin.6    Smokeless tobacco: Never   Vaping Use    Vaping status: Never Used   Substance and Sexual Activity    Alcohol use: Never    Drug use: Never    Sexual activity: Defer   Other Topics Concern    Not on file   Social History Narrative    Not on file     Social Determinants of Health     Financial Resource Strain: Low Risk  (2024)    Overall Financial Resource Strain (CARDIA)     Difficulty of Paying Living Expenses: Not very hard   Food Insecurity: No Food Insecurity (10/2/2023)    Hunger Vital Sign     Worried About Running Out of Food in the Last Year: Never true     Ran Out of Food in the Last Year: Never true   Transportation Needs: No  Transportation Needs (2/8/2024)    PRAPARE - Transportation     Lack of Transportation (Medical): No     Lack of Transportation (Non-Medical): No   Physical Activity: Sufficiently Active (10/2/2023)    Exercise Vital Sign     Days of Exercise per Week: 5 days     Minutes of Exercise per Session: 30 min   Stress: Stress Concern Present (10/2/2023)    [employer] - Occupational Stress Questionnaire     Feeling of Stress : To some extent   Social Connections: Moderately Isolated (10/2/2023)    Social Connection and Isolation Panel [NHANES]     Frequency of Communication with Friends and Family: More than three times a week     Frequency of Social Gatherings with Friends and Family: Once a week     Attends Hinduism Services: Never     Active Member of Clubs or Organizations: No     Attends Club or Organization Meetings: Never     Marital Status:    Intimate Partner Violence: Not At Risk (10/2/2023)    Humiliation, Afraid, Rape, and Kick questionnaire     Fear of Current or Ex-Partner: No     Emotionally Abused: No     Physically Abused: No     Sexually Abused: No   Housing Stability: Low Risk  (2/8/2024)    Housing Stability Vital Sign     Unable to Pay for Housing in the Last Year: No     Number of Places Lived in the Last Year: 0     Unstable Housing in the Last Year: No       Surgical History  Past Surgical History:   Procedure Laterality Date    CARDIAC CATHETERIZATION N/A 10/2/2023    Procedure: PCI CARLOS ALBERTO Stent- Coronary;  Surgeon: Nish Parnell MD;  Location: [facility];  Service: Cardiovascular;  Laterality: N/A;  RCA PCI. Please schedule to follow my 2nd case of the afternoon    CARDIAC CATHETERIZATION N/A 10/2/2023    Procedure: IVUS - Coronary;  Surgeon: Nish Parnell MD;  Location: [facility];  Service: Cardiovascular;  Laterality: N/A;    CARDIAC CATHETERIZATION N/A 10/1/2023    Procedure: Left Heart Cath, No LV;  Surgeon:  Nish Parnell MD;  Location: [facility];  Service: Cardiovascular;  Laterality: N/A;    CARDIAC CATHETERIZATION N/A 10/1/2023    Procedure: PCI;  Surgeon: Nish Parnell MD;  Location: [facility];  Service: Cardiovascular;  Laterality: N/A;    CARDIAC CATHETERIZATION N/A 10/6/2023    Procedure: Left Heart Cath;  Surgeon: Nish Parnell MD;  Location: [facility];  Service: Cardiovascular;  Laterality: N/A;    CARDIAC CATHETERIZATION N/A 10/6/2023    Procedure: PCI CARLOS ALBERTO Stent- Coronary;  Surgeon: Nish Parnell MD;  Location: [facility];  Service: Cardiovascular;  Laterality: N/A;  RCA PCI with Shockwave. Please schedule at 9 am if possible    CARDIAC CATHETERIZATION N/A 10/6/2023    Procedure: IVUS - Coronary;  Surgeon: Nish Parnell MD;  Location: [facility];  Service: Cardiovascular;  Laterality: N/A;    CORONARY ANGIOPLASTY WITH STENT PLACEMENT      US ASPIRATION INJECTION INTERMEDIATE JOINT  12/23/2019    US ASPIRATION INJECTION INTERMEDIATE JOINT 12/23/2019 [facility]    US GUIDED THYROID BIOPSY  12/13/2019    US GUIDED THYROID BIOPSY 12/13/2019 [facility]       Physical Exam:  GENERAL:  Patient is awake, alert, and oriented to person place and time.  Patient appears well nourished and well kept.  Affect Calm, Not Acutely Distressed.  HEENT:  Normocephalic, Atraumatic, EOMI  CARDIOVASCULAR:  Hemodynamically stable.  RESPIRATORY:  Normal respirations with unlabored breathing.  Extremity: Right knee skin is intact. No erythema or warmth. No clinical signs of infection.  1-2+ effusion.  She can flex her right knee to 120 degrees with pain.  Full extension is 0 degrees.  Pain over the medial joint line.  Mild pain over the lateral joint line.  Patellar and quadricep mechanism tact.  Negative valgus and varus stresses.  No calf tenderness.  Distal pulses are palpable.  Left knee was  examined for comparison.      Diagnostics: X-rays reviewed        Procedure: None    Assessment: Right knee osteoarthritis    Plan: Audrey presents today for reevaluation for worsening right knee pain secondary to osteoarthritis. She has not responded to gel injections, corticosteroid injection and bracing. Based on her recent history of heart attack, she may not qualify for elective knee replacement surgery until clearance by cardiology due to recent heart attack with drug-eluting stent placement. She will check with her cardiologist and PCP for medical clearance on how soon she can be eligible for elective knee replacement surgery.     No orders of the defined types were placed in this encounter.     At the conclusion of the visit there were no further questions by the patient/family regarding their plan of care.  Patient was instructed to call or return with any issues, questions, or concerns regarding their injury and/or treatment plan described above.     05/16/24 at 10:42 AM - Jannette Desouza MD  Scribe Attestation  By signing my name below, I, Palomo Epperson, Scribe   attest that this documentation has been prepared under the direction and in the presence of Jannette Desouza MD.    Office: [phone]    This note was prepared using voice recognition software.  The details of this note are correct and have been reviewed, and corrected to the best of my ability.  Some grammatical errors may persist related to the Dragon software.

## 2024-05-31 ENCOUNTER — LAB (OUTPATIENT)
Dept: LAB | Facility: LAB | Age: 64
End: 2024-05-31
Payer: COMMERCIAL

## 2024-05-31 ENCOUNTER — OFFICE VISIT (OUTPATIENT)
Dept: PRIMARY CARE | Facility: CLINIC | Age: 64
End: 2024-05-31
Payer: COMMERCIAL

## 2024-05-31 VITALS
DIASTOLIC BLOOD PRESSURE: 78 MMHG | BODY MASS INDEX: 35.88 KG/M2 | HEART RATE: 65 BPM | WEIGHT: 178 LBS | HEIGHT: 59 IN | TEMPERATURE: 96.6 F | SYSTOLIC BLOOD PRESSURE: 117 MMHG

## 2024-05-31 DIAGNOSIS — E78.2 MIXED HYPERLIPIDEMIA: ICD-10-CM

## 2024-05-31 DIAGNOSIS — E66.01 CLASS 2 SEVERE OBESITY DUE TO EXCESS CALORIES WITH SERIOUS COMORBIDITY AND BODY MASS INDEX (BMI) OF 35.0 TO 35.9 IN ADULT (MULTI): Primary | ICD-10-CM

## 2024-05-31 DIAGNOSIS — I10 PRIMARY HYPERTENSION: ICD-10-CM

## 2024-05-31 DIAGNOSIS — I25.110 CORONARY ARTERY DISEASE INVOLVING NATIVE CORONARY ARTERY OF NATIVE HEART WITH UNSTABLE ANGINA PECTORIS (MULTI): ICD-10-CM

## 2024-05-31 DIAGNOSIS — F41.1 GAD (GENERALIZED ANXIETY DISORDER): ICD-10-CM

## 2024-05-31 PROBLEM — E66.812 CLASS 2 SEVERE OBESITY DUE TO EXCESS CALORIES WITH SERIOUS COMORBIDITY AND BODY MASS INDEX (BMI) OF 35.0 TO 35.9 IN ADULT: Status: ACTIVE | Noted: 2024-05-31

## 2024-05-31 LAB
ANION GAP SERPL CALC-SCNC: 13 MMOL/L (ref 10–20)
BASOPHILS # BLD AUTO: 0.1 X10*3/UL (ref 0–0.1)
BASOPHILS NFR BLD AUTO: 1.2 %
BUN SERPL-MCNC: 20 MG/DL (ref 6–23)
CALCIUM SERPL-MCNC: 9.4 MG/DL (ref 8.6–10.3)
CHLORIDE SERPL-SCNC: 106 MMOL/L (ref 98–107)
CO2 SERPL-SCNC: 27 MMOL/L (ref 21–32)
CREAT SERPL-MCNC: 0.77 MG/DL (ref 0.5–1.05)
EGFRCR SERPLBLD CKD-EPI 2021: 86 ML/MIN/1.73M*2
EOSINOPHIL # BLD AUTO: 0.35 X10*3/UL (ref 0–0.7)
EOSINOPHIL NFR BLD AUTO: 4.1 %
ERYTHROCYTE [DISTWIDTH] IN BLOOD BY AUTOMATED COUNT: 15 % (ref 11.5–14.5)
GLUCOSE SERPL-MCNC: 120 MG/DL (ref 74–99)
HCT VFR BLD AUTO: 38 % (ref 36–46)
HGB BLD-MCNC: 11.3 G/DL (ref 12–16)
IMM GRANULOCYTES # BLD AUTO: 0.08 X10*3/UL (ref 0–0.7)
IMM GRANULOCYTES NFR BLD AUTO: 0.9 % (ref 0–0.9)
LYMPHOCYTES # BLD AUTO: 1.41 X10*3/UL (ref 1.2–4.8)
LYMPHOCYTES NFR BLD AUTO: 16.6 %
MAGNESIUM SERPL-MCNC: 1.67 MG/DL (ref 1.6–2.4)
MCH RBC QN AUTO: 25.6 PG (ref 26–34)
MCHC RBC AUTO-ENTMCNC: 29.7 G/DL (ref 32–36)
MCV RBC AUTO: 86 FL (ref 80–100)
MONOCYTES # BLD AUTO: 0.57 X10*3/UL (ref 0.1–1)
MONOCYTES NFR BLD AUTO: 6.7 %
NEUTROPHILS # BLD AUTO: 5.97 X10*3/UL (ref 1.2–7.7)
NEUTROPHILS NFR BLD AUTO: 70.5 %
NRBC BLD-RTO: 0 /100 WBCS (ref 0–0)
PLATELET # BLD AUTO: 246 X10*3/UL (ref 150–450)
POTASSIUM SERPL-SCNC: 4.3 MMOL/L (ref 3.5–5.3)
RBC # BLD AUTO: 4.42 X10*6/UL (ref 4–5.2)
SODIUM SERPL-SCNC: 142 MMOL/L (ref 136–145)
WBC # BLD AUTO: 8.5 X10*3/UL (ref 4.4–11.3)

## 2024-05-31 PROCEDURE — 99213 OFFICE O/P EST LOW 20 MIN: CPT | Performed by: INTERNAL MEDICINE

## 2024-05-31 PROCEDURE — 85025 COMPLETE CBC W/AUTO DIFF WBC: CPT

## 2024-05-31 PROCEDURE — 1036F TOBACCO NON-USER: CPT | Performed by: INTERNAL MEDICINE

## 2024-05-31 PROCEDURE — 3078F DIAST BP <80 MM HG: CPT | Performed by: INTERNAL MEDICINE

## 2024-05-31 PROCEDURE — 36415 COLL VENOUS BLD VENIPUNCTURE: CPT

## 2024-05-31 PROCEDURE — 83735 ASSAY OF MAGNESIUM: CPT

## 2024-05-31 PROCEDURE — 1159F MED LIST DOCD IN RCRD: CPT | Performed by: INTERNAL MEDICINE

## 2024-05-31 PROCEDURE — 1160F RVW MEDS BY RX/DR IN RCRD: CPT | Performed by: INTERNAL MEDICINE

## 2024-05-31 PROCEDURE — 80048 BASIC METABOLIC PNL TOTAL CA: CPT

## 2024-05-31 PROCEDURE — 1123F ACP DISCUSS/DSCN MKR DOCD: CPT | Performed by: INTERNAL MEDICINE

## 2024-05-31 PROCEDURE — 3074F SYST BP LT 130 MM HG: CPT | Performed by: INTERNAL MEDICINE

## 2024-05-31 PROCEDURE — 3008F BODY MASS INDEX DOCD: CPT | Performed by: INTERNAL MEDICINE

## 2024-05-31 ASSESSMENT — ENCOUNTER SYMPTOMS
NERVOUS/ANXIOUS: 1
COUGH: 0
NAUSEA: 0
INSOMNIA: 0
DEPRESSED MOOD: 0
WEAKNESS: 0
SHORTNESS OF BREATH: 0
CARDIOVASCULAR NEGATIVE: 1
ARTHRALGIAS: 1
CHOKING: 0
DIARRHEA: 1
CONSTITUTIONAL NEGATIVE: 1
NEUROLOGICAL NEGATIVE: 1

## 2024-05-31 NOTE — PROGRESS NOTES
"Subjective   Patient ID: Audrey Estrada is a 63 y.o. female who presents for Follow-up (3 mo fu).    Anxiety  Presents for follow-up visit. Symptoms include nervous/anxious behavior. Patient reports no chest pain, depressed mood, insomnia, nausea or shortness of breath. Symptoms occur most days. The quality of sleep is fair.     Side effects of treatment include GI discomfort.      Heart Problem  This is a new problem. The current episode started more than 1 month ago. The problem occurs rarely. The problem has been resolved. Pertinent negatives include no abdominal pain, anorexia, arthralgias, congestion, diaphoresis, headaches, nausea, neck pain, numbness or weakness. The treatment provided moderate relief.   Hypertension  This is a chronic problem. The current episode started more than 1 year ago. The problem has been waxing and waning since onset. The problem is controlled. Pertinent negatives include no blurred vision, headaches or neck pain. Past treatments include beta blockers and angiotensin blockers. The current treatment provides significant improvement. There are no compliance problems.  Hypertensive end-organ damage includes CAD/MI  Review of Systems   Constitutional: Negative.         Wt gain 14 lbs   Respiratory:  Negative for cough, choking and shortness of breath.    Cardiovascular: Negative.  Negative for chest pain.   Gastrointestinal:  Positive for diarrhea. Negative for nausea.   Musculoskeletal:  Positive for arthralgias.   Neurological: Negative.  Negative for weakness.   Psychiatric/Behavioral:  The patient is nervous/anxious. The patient does not have insomnia.        Objective   /78 (BP Location: Right arm, Patient Position: Sitting, BP Cuff Size: Adult)   Pulse 65   Temp 35.9 °C (96.6 °F) (Temporal)   Ht 1.499 m (4' 11\")   Wt 80.7 kg (178 lb)   BMI 35.95 kg/m²     Physical Exam  Vitals reviewed.   Constitutional:       Appearance: Normal appearance. She is obese.   HENT:      " Head: Normocephalic and atraumatic.   Eyes:      Conjunctiva/sclera: Conjunctivae normal.   Cardiovascular:      Rate and Rhythm: Normal rate and regular rhythm.      Pulses: Normal pulses.   Pulmonary:      Effort: Pulmonary effort is normal.      Breath sounds: Normal breath sounds.   Abdominal:      General: Abdomen is protuberant.      Palpations: Abdomen is soft.   Musculoskeletal:         General: Tenderness present. No deformity.      Cervical back: Neck supple.   Skin:     General: Skin is warm and dry.   Neurological:      General: No focal deficit present.   Psychiatric:         Mood and Affect: Mood normal.       Assessment/Plan   Problem List Items Addressed This Visit             ICD-10-CM    HTN (hypertension) I10    Relevant Orders    Basic metabolic panel    CBC and Auto Differential    Magnesium    Hyperlipidemia E78.5    Coronary artery disease involving native coronary artery of native heart with unstable angina pectoris (Multi) I25.110    Relevant Orders    Basic metabolic panel    CBC and Auto Differential    Magnesium    MEMO (generalized anxiety disorder) F41.1    Class 2 severe obesity due to excess calories with serious comorbidity and body mass index (BMI) of 35.0 to 35.9 in adult (Multi) - Primary E66.01, Z68.35   Pt does not have any angina lately, aware of using NTG if needed, aware to notify MD if any new chest pains happens. Compliance is appropriate. Statin therapy reviewed, still patient remains anxious about this heart issue she went through, in between she may calm down, Lexapro will be continued when she was having some orders to smoke but she is not smoking I told her that she has to die about her hypertension she may chew gums, she may stay occupied with some sort of activities.  Smoking will not be started again and she will try her best, she has a some symptoms of irritable bowel syndrome and that could be related to interim anxiety off and on.  We will check her potassium,  magnesium, hemoglobin.  She was anemic last time, she stays on statins, LDL has been controlled, she gained weight because of some inadvertent eating, she needs to be aware about these body mass index going up to 35, no new medications were given, topical Voltaren gel can be used for the right knee, systemic NSAID is not allowed, she stays on iron, statin high-dose to be continued, BP readings are stable, visceral obesity remains, follow-up in 3 months.

## 2024-06-18 DIAGNOSIS — E87.6 HYPOKALEMIA: ICD-10-CM

## 2024-06-18 RX ORDER — POTASSIUM CHLORIDE 750 MG/1
10 TABLET, FILM COATED, EXTENDED RELEASE ORAL DAILY
Qty: 30 TABLET | Refills: 11 | Status: SHIPPED | OUTPATIENT
Start: 2024-06-18 | End: 2025-06-18

## 2024-07-22 ENCOUNTER — APPOINTMENT (OUTPATIENT)
Dept: CARDIOLOGY | Facility: CLINIC | Age: 64
End: 2024-07-22
Payer: COMMERCIAL

## 2024-07-22 VITALS
HEART RATE: 60 BPM | WEIGHT: 185.3 LBS | SYSTOLIC BLOOD PRESSURE: 122 MMHG | DIASTOLIC BLOOD PRESSURE: 60 MMHG | BODY MASS INDEX: 37.43 KG/M2

## 2024-07-22 DIAGNOSIS — E78.2 MIXED HYPERLIPIDEMIA: ICD-10-CM

## 2024-07-22 DIAGNOSIS — I25.110 CORONARY ARTERY DISEASE INVOLVING NATIVE CORONARY ARTERY OF NATIVE HEART WITH UNSTABLE ANGINA PECTORIS (MULTI): Primary | ICD-10-CM

## 2024-07-22 DIAGNOSIS — R07.89 CHEST TIGHTNESS: ICD-10-CM

## 2024-07-22 DIAGNOSIS — Z87.891 FORMER CIGARETTE SMOKER: ICD-10-CM

## 2024-07-22 DIAGNOSIS — I10 PRIMARY HYPERTENSION: ICD-10-CM

## 2024-07-22 PROCEDURE — 3078F DIAST BP <80 MM HG: CPT | Performed by: STUDENT IN AN ORGANIZED HEALTH CARE EDUCATION/TRAINING PROGRAM

## 2024-07-22 PROCEDURE — 3074F SYST BP LT 130 MM HG: CPT | Performed by: STUDENT IN AN ORGANIZED HEALTH CARE EDUCATION/TRAINING PROGRAM

## 2024-07-22 PROCEDURE — 99214 OFFICE O/P EST MOD 30 MIN: CPT | Performed by: STUDENT IN AN ORGANIZED HEALTH CARE EDUCATION/TRAINING PROGRAM

## 2024-07-22 PROCEDURE — 1123F ACP DISCUSS/DSCN MKR DOCD: CPT | Performed by: STUDENT IN AN ORGANIZED HEALTH CARE EDUCATION/TRAINING PROGRAM

## 2024-07-22 NOTE — PATIENT INSTRUCTIONS
Continue same medications and treatments.   Patient educated on proper medication use.   Patient educated on risk factor modification.   Please bring any lab results from other providers / physicians to your next appointment.     Please bring all medicines, vitamins, and herbal supplements with you when you come to the office.     Prescriptions will not be filled unless you are compliant with your follow up appointments or have a follow up appointment scheduled as per instruction of your physician. Refills should be requested at the time of your visit.    OKAY TO TAKE TYLENOL FOR PAIN    STRESS TEST (MPL) ORDERED, WILL HAVE SURGERY AT THE END OF THE YEAR    FOLLOW UP IN NOVEMBER 2024    Munira ARITA LPN, am scribing for and in the presence of Dr. Nish Padron MD

## 2024-07-22 NOTE — PROGRESS NOTES
Chief complaint:   Chief Complaint   Patient presents with    Follow-up     6 month follow-up.  She c/o SOB on exertion        History of Present Illness  Audrey Estrada is a 64 y.o. year old female patient with history of smoking, hyperlipidemia, and hypertension, CAD with STEMI status post PCI to circumflex and RCA in 10/2023 who is presenting for cardiovascular follow-up  Today patient continues to report fatigue throughout the day.  She reports she feels without energy and does not feel like doing any activities at home.  Denies chest pain, does report some shortness of breath at home.  Reports she was not able to do cardiac rehab due to severe knee pain and osteoarthritis.          Social History     Tobacco Use    Smoking status: Former     Current packs/day: 0.00     Average packs/day: 1 pack/day for 15.0 years (15.0 ttl pk-yrs)     Types: Cigarettes     Start date: 2008     Quit date: 2023     Years since quittin.8    Smokeless tobacco: Never   Vaping Use    Vaping status: Never Used   Substance Use Topics    Alcohol use: Never    Drug use: Never       Outpatient Medications:  Current Outpatient Medications   Medication Instructions    amLODIPine (Norvasc) 5 mg tablet Take one tablet as needed once a day if SBP more then 160 and DBP more then 90 mm Hg    aspirin 81 mg, oral, Daily    atorvastatin (LIPITOR) 80 mg, oral, Daily    carvedilol (COREG) 25 mg, oral, 2 times daily    cholecalciferol (VITAMIN D-3) 25 mcg, oral, Daily    escitalopram (LEXAPRO) 10 mg, oral, Daily    ferrous sulfate 325 (65 Fe) MG EC tablet 1 tablet, oral, Daily RT, Take on Mon, Wed, and Fri    losartan (COZAAR) 100 mg, oral, Daily    magnesium oxide (MAGOX) 400 mg, oral, Daily    omega-3 acid ethyl esters (Lovaza) 1 gram capsule TAKE 1 CAPSULE BY MOUTH TWICE A DAY    omeprazole OTC (PRILOSEC OTC) 1 mg, oral, Daily before breakfast, Do not crush, chew, or split.    potassium chloride CR (Klor-Con) 10 mEq ER tablet 10  mEq, oral, Daily, Do not crush, chew, or split.    ticagrelor (BRILINTA) 90 mg, oral, 2 times daily         Vitals:  Vitals:    07/22/24 1107   BP: 122/60   Pulse: 60       Physical Exam:  General: NAD, well-appearing  HEENT: moist mucous membranes, no jaundice  Neck: No JVD, no carotid bruit  Lungs: CTA luisa, no wheezing or rales  Cardiac: RRR, no murmurs  Abdomen: soft, non-tender, non-distended  Extremities: 2+ radial pulses, no edema, no wounds  Skin: warm, dry  Neurologic: AAOx3,  no focal deficits         Reviewed Study(s):    Vascular US carotid artery duplex bilateral 12/04/2023    Narrative  Interpreted By:  Markus Rogers,  STUDY:  [facility] US CAROTID ARTERY DUPLEX BILATERAL;  12/4/2023 1:07 pm    INDICATION:  Signs/Symptoms:CAD.    COMPARISON:  None.    ACCESSION NUMBER(S):  [id number]    ORDERING CLINICIAN:  ATILIO MILLS    TECHNIQUE:  Vascular ultrasound of the extracranial carotid system was performed  bilaterally.  Gray scale, color Doppler and spectral Doppler waveform  analysis was performed.    FINDINGS:  There is atherosclerotic disease, most conspicuous at the carotid  bulbs.    RIGHT:    RIGHT SIDE PEAK SYSTOLIC VELOCITY TABLE:  CCA 86 cm/sec.   cm/sec.   cm/sec.    The ratio of the peak systolic velocity of the right ICA/CCA is 1.2.    RIGHT VERTEBRAL ARTERY:  The right vertebral artery demonstrates proximal anterograde flow.    LEFT:    LEFT SIDE PEAK SYSTOLIC VELOCITY TABLE:  CCA 66 cm/sec.  ICA to 17 cm/sec.   cm/sec.    The ratio of the peak systolic velocity of the left ICA/CCA is 3.3.    LEFT VERTEBRAL ARTERY:  The left vertebral artery demonstrates proximal anterograde flow.    Impression  Utilizing the peak systolic velocities, the estimated degree of  stenosis within the internal carotid arteries is 50-69% on the left  and less than 50% on the right.    MACRO:  None.      Signed by: Markus Rogers 12/4/2023 1:11 PM  Dictation workstation:   [id number]          Assessment/Plan   Diagnoses and all orders for this visit:  Coronary artery disease involving native coronary artery of native heart with unstable angina pectoris (Multi)  Primary hypertension  Mixed hyperlipidemia  Chest tightness  BMI 37.0-37.9, adult  Former cigarette smoker      #Coronary Artery Disease, s/p PCI  - CAD with inferior STEMI status post PCI to circumflex and RCA in 10/2023   - Continue DAPT with aspirin and Brilinta for minimum 1 year  - Patient has not been able to do cardiac rehab due to severe right knee osteoarthritis. Recommend she undergoes evaluation for further treatment of OA  -Given complaints of SOB will obtain pharmacological nuclear stress test    # Fatigue  -Follow up with PCP - may need workup for sleep apnea, as well as optimization of antidepressants     # Hyperlipidemia   - LDL at goal for patient (history CAD with CARLOS ALBERTO)   - Tolerating high dose oral statin, Lipitor 80mg     # Asymptomatic mild carotid Disease, bilaterally  - Discussed results today with patient  - Will repeat imaging in 1 year for follow up     # Prior near syncope  -No arrhythmia on monitor      Juvenal Mendes MD [facility]  Interventional Cardiology  Endovascular Interventions  [email or web address]    **Disclaimer: This note was dictated by speech recognition, and every effort has been made to prevent any error in transcription, however minor errors may be present**

## 2024-08-01 ENCOUNTER — OFFICE VISIT (OUTPATIENT)
Dept: ORTHOPEDIC SURGERY | Facility: CLINIC | Age: 64
End: 2024-08-01
Payer: COMMERCIAL

## 2024-08-01 VITALS — BODY MASS INDEX: 36.08 KG/M2 | HEIGHT: 59 IN | WEIGHT: 179 LBS

## 2024-08-01 DIAGNOSIS — M17.11 PRIMARY LOCALIZED OSTEOARTHRITIS OF RIGHT KNEE: Primary | ICD-10-CM

## 2024-08-01 PROCEDURE — 99213 OFFICE O/P EST LOW 20 MIN: CPT | Performed by: ORTHOPAEDIC SURGERY

## 2024-08-01 PROCEDURE — 1123F ACP DISCUSS/DSCN MKR DOCD: CPT | Performed by: ORTHOPAEDIC SURGERY

## 2024-08-01 PROCEDURE — L1812 KO ELASTIC W/JOINTS PRE OTS: HCPCS | Performed by: ORTHOPAEDIC SURGERY

## 2024-08-01 PROCEDURE — 2500000005 HC RX 250 GENERAL PHARMACY W/O HCPCS: Performed by: ORTHOPAEDIC SURGERY

## 2024-08-01 PROCEDURE — 20610 DRAIN/INJ JOINT/BURSA W/O US: CPT | Mod: RT | Performed by: ORTHOPAEDIC SURGERY

## 2024-08-01 PROCEDURE — 2500000004 HC RX 250 GENERAL PHARMACY W/ HCPCS (ALT 636 FOR OP/ED): Performed by: ORTHOPAEDIC SURGERY

## 2024-08-01 PROCEDURE — 3008F BODY MASS INDEX DOCD: CPT | Performed by: ORTHOPAEDIC SURGERY

## 2024-08-01 RX ORDER — LIDOCAINE HYDROCHLORIDE 10 MG/ML
5 INJECTION INFILTRATION; PERINEURAL
Status: COMPLETED | OUTPATIENT
Start: 2024-08-01 | End: 2024-08-01

## 2024-08-01 RX ORDER — BETAMETHASONE SODIUM PHOSPHATE AND BETAMETHASONE ACETATE 3; 3 MG/ML; MG/ML
2 INJECTION, SUSPENSION INTRA-ARTICULAR; INTRALESIONAL; INTRAMUSCULAR; SOFT TISSUE
Status: COMPLETED | OUTPATIENT
Start: 2024-08-01 | End: 2024-08-01

## 2024-08-01 NOTE — PROGRESS NOTES
History of present illness: History of right knee arthritis advancing arthritis bone-on-bone arthrosis with advancing varus deformity    She has had an exhaustive conservative treatment program with gel shot bracing cortisone in the past things have worked well but last gel and series caused him more swelling and painful Baker's cyst and additional discomfort    She is trying to do cardiac rehab as she is about 10 months out from stenting but she is unable to ambulate and walk she like to proceed with knee replacement and per her cardiologist they like us to do it in October or November months that will be 1 year out from her stents when she can come off her blood thinners    Physical exam:    General: No acute distress or breathing difficulty or discomfort, pleasant and cooperative with the examination.    Extremities: The affected right knee was examined and inspected and was tender to touch along the medial and lateral aspect with catching, locking or mechanical symptoms.    The skin was intact without breakdown or open wound.  Old incisions of present were healed.    There was a mild Mikala exam seen with some evidence of instability and weakness in the collateral ligaments with varus valgus stressing and laxity in the anterior or posterior planes.    There was a negative Lachman's test pivot shift test and posterior drawer sign with no foot drop, numbness or tingling.    Sensation, reflexes and pulses in the foot and ankle are preserved.  There was an effusion.  Range of motion showed good straight leg raise with flexion to 150 degrees  and extension to 0 degrees degrees.  The patient had the ability to bear weight but with discomfort.  The patient's gait was antalgic secondary to discomfort      Before aspiration injection the benefits of a cortisone injection including infection, local skin irritation, skin atrophy, calcification, continued pain and discomfort, elevated blood sugar, burning, failure to  relieve pain, possible late infection were discussed with the patient.    Postprocedure discomfort can be alleviated with additional medications, ice, elevation, rest over the first 24 hours as recommended.    Patient verbalized understanding and wanted to proceed with the planned procedure.    After informed consent was provided and allergies verified, the patient was positioned appropriately on the bed.  The right knee to be aspirated and injected was prepped and draped in a sterile fashion.  The skin was anesthetized with ethyl chloride spray.  A joint aspiration was to be performed an 18-gauge needle was used otherwise a 22-gauge needle was used to inject the appropriate joint.    Joint injection was performed with a mixture of 5 cc 1% lidocaine plain and 2 cc Celestone Soluspan 6 mg per mL.  The needle was removed and the puncture site closed and sealed with a Band-Aid.  The patient tolerated the procedure well.        Diagnostic studies: X-rays reviewed from May 2024 showing advanced knee arthritis bone-on-bone arthrosis varus deformity right knee    Impression: Right knee advanced arthritis    Plan: Injection today ice elevate low impact range of motion program    Appropriate medical and cardiac workup prior to surgery    She will have surgery at [facility] in November 2024    Risk and benefits of surgery discussed extensively with the patient.    Surgical risk included but were not limited to infection, wear, loosening, need for further surgery blood clot, failure to heal, failure of the surgery, stiffness, loss of limb life, extremity function change in length change, and associated risks of  any surgery.  L Inj/Asp: R knee on 8/1/2024 1:45 PM  Indications: pain and diagnostic evaluation  Details: 22 G needle, anteromedial approach  Medications: 5 mL lidocaine 10 mg/mL (1 %); 2 mL betamethasone acet,sod phos 6 mg/mL  Outcome: tolerated well, no immediate complications  Procedure, treatment  alternatives, risks and benefits explained, specific risks discussed. Consent was given by the patient. Immediately prior to procedure a time out was called to verify the correct patient, procedure, equipment, support staff and site/side marked as required. Patient was prepped and draped in the usual sterile fashion.

## 2024-08-20 ENCOUNTER — TELEPHONE (OUTPATIENT)
Dept: CARDIOLOGY | Facility: CLINIC | Age: 64
End: 2024-08-20
Payer: COMMERCIAL

## 2024-08-20 NOTE — TELEPHONE ENCOUNTER
Patient called and LM stating she is having a knee replacement on 11/8/24. Patient will need clearance from Dr[facility] Nish Mendes [facility]. Routed to Marc Fox RN

## 2024-08-28 ENCOUNTER — TELEPHONE (OUTPATIENT)
Dept: CARDIOLOGY | Facility: CLINIC | Age: 64
End: 2024-08-28
Payer: COMMERCIAL

## 2024-08-28 NOTE — TELEPHONE ENCOUNTER
----- Message from Rafia CLARK sent at 8/23/2024  4:42 PM EDT -----  Regarding: POC  Patient had visit on 07/22 with Dr. Mendes. States at that time she had mentioned she would be undergoing a procedure in November (knee replacement). Per patient Dr. Mendes cleared pt for surgery. I do not see any form or anything in her office note pt wants to make sure this is okay.

## 2024-08-30 ENCOUNTER — APPOINTMENT (OUTPATIENT)
Dept: PRIMARY CARE | Facility: CLINIC | Age: 64
End: 2024-08-30
Payer: COMMERCIAL

## 2024-09-03 ENCOUNTER — HOSPITAL ENCOUNTER (OUTPATIENT)
Facility: HOSPITAL | Age: 64
Setting detail: OUTPATIENT SURGERY
End: 2024-09-03
Attending: ORTHOPAEDIC SURGERY | Admitting: ORTHOPAEDIC SURGERY
Payer: COMMERCIAL

## 2024-09-03 PROBLEM — M17.11 UNILATERAL PRIMARY OSTEOARTHRITIS, RIGHT KNEE: Status: ACTIVE | Noted: 2024-09-03

## 2024-09-03 NOTE — TELEPHONE ENCOUNTER
Patient seen in July with Dr. Nish Mendes MD [facility]   #Coronary Artery Disease, s/p PCI  - CAD with inferior STEMI status post PCI to circumflex and RCA in 10/2023   - Continue DAPT with aspirin and Brilinta for minimum 1 year  - Patient has not been able to do cardiac rehab due to severe right knee osteoarthritis. Recommend she undergoes evaluation for further treatment of OA  -Given complaints of SOB will obtain pharmacological nuclear stress test    I do note that Nuclear is scheduled in November.   I also see pending orthopedic procedure shortly after nuclear.     Secretaries- can we move up nuclear imaging. Not sure if patient had barriers to scheduling or if precert had barriers; however, at the time of Dr. Nish Mendes MD [facility] visit we were not aware of planned surgery. Instead, nuclear evaluation was ordered to further evaluate patients SOB complaints given Coronary Disease history. This should be done sooner than later.     Once completed and reviewed- I can discuss preop clearance with Dr. Nish Mendes MD [facility] at that time, but she will not clear patient for invasive orthopedic surgery ahead of Stress Test being completed. Thank you.

## 2024-09-07 DIAGNOSIS — E78.5 HYPERLIPIDEMIA, UNSPECIFIED: ICD-10-CM

## 2024-09-09 RX ORDER — OMEGA-3-ACID ETHYL ESTERS 1 G/1
CAPSULE, LIQUID FILLED ORAL
Qty: 180 CAPSULE | Refills: 0 | Status: SHIPPED | OUTPATIENT
Start: 2024-09-09

## 2024-09-09 NOTE — TELEPHONE ENCOUNTER
Nuclear testing moved up to 9/24/24.     Will postpone this note to follow up on testing results with Dr. Nish Mendes MD [facility] and review clearance at that time.

## 2024-09-10 ENCOUNTER — LAB (OUTPATIENT)
Dept: LAB | Facility: LAB | Age: 64
End: 2024-09-10
Payer: COMMERCIAL

## 2024-09-10 DIAGNOSIS — R07.89 CHEST TIGHTNESS: ICD-10-CM

## 2024-09-10 DIAGNOSIS — I25.110 CORONARY ARTERY DISEASE INVOLVING NATIVE CORONARY ARTERY OF NATIVE HEART WITH UNSTABLE ANGINA PECTORIS (MULTI): ICD-10-CM

## 2024-09-10 LAB
ANION GAP SERPL CALC-SCNC: 13 MMOL/L (ref 10–20)
BUN SERPL-MCNC: 17 MG/DL (ref 6–23)
CALCIUM SERPL-MCNC: 9.5 MG/DL (ref 8.6–10.3)
CHLORIDE SERPL-SCNC: 108 MMOL/L (ref 98–107)
CO2 SERPL-SCNC: 25 MMOL/L (ref 21–32)
CREAT SERPL-MCNC: 0.94 MG/DL (ref 0.5–1.05)
EGFRCR SERPLBLD CKD-EPI 2021: 67 ML/MIN/1.73M*2
GLUCOSE SERPL-MCNC: 111 MG/DL (ref 74–99)
MAGNESIUM SERPL-MCNC: 1.82 MG/DL (ref 1.6–2.4)
POTASSIUM SERPL-SCNC: 3.8 MMOL/L (ref 3.5–5.3)
SODIUM SERPL-SCNC: 142 MMOL/L (ref 136–145)

## 2024-09-10 PROCEDURE — 80048 BASIC METABOLIC PNL TOTAL CA: CPT

## 2024-09-10 PROCEDURE — 36415 COLL VENOUS BLD VENIPUNCTURE: CPT

## 2024-09-10 PROCEDURE — 83735 ASSAY OF MAGNESIUM: CPT

## 2024-09-10 PROCEDURE — 80053 COMPREHEN METABOLIC PANEL: CPT

## 2024-09-10 NOTE — TELEPHONE ENCOUNTER
"Patient walk in:    Patient came in today concerned about the stress test as the last time she did the injection she did not feel good at all during it. Patient stated she cannot walk long distances as well. She stated she has been having chest pain that feels like \"tightness or discomfort\" twice a week for the past couple weeks. Patient stated she takes 4 ASA 81 mg when this happens. Patient does not have nitroglycerin at home. Routed to Marc Fox RN   "

## 2024-09-13 ENCOUNTER — APPOINTMENT (OUTPATIENT)
Dept: PRIMARY CARE | Facility: CLINIC | Age: 64
End: 2024-09-13
Payer: COMMERCIAL

## 2024-09-13 VITALS
BODY MASS INDEX: 37.09 KG/M2 | DIASTOLIC BLOOD PRESSURE: 80 MMHG | HEIGHT: 59 IN | SYSTOLIC BLOOD PRESSURE: 130 MMHG | WEIGHT: 184 LBS | TEMPERATURE: 96.6 F | HEART RATE: 80 BPM

## 2024-09-13 DIAGNOSIS — I10 PRIMARY HYPERTENSION: Primary | ICD-10-CM

## 2024-09-13 DIAGNOSIS — E78.2 MIXED HYPERLIPIDEMIA: ICD-10-CM

## 2024-09-13 DIAGNOSIS — F41.1 GAD (GENERALIZED ANXIETY DISORDER): ICD-10-CM

## 2024-09-13 DIAGNOSIS — Z23 FLU VACCINE NEED: ICD-10-CM

## 2024-09-13 DIAGNOSIS — I25.110 CORONARY ARTERY DISEASE INVOLVING NATIVE CORONARY ARTERY OF NATIVE HEART WITH UNSTABLE ANGINA PECTORIS (MULTI): ICD-10-CM

## 2024-09-13 LAB
ALBUMIN SERPL BCP-MCNC: 4.3 G/DL (ref 3.4–5)
ALP SERPL-CCNC: 48 U/L (ref 33–136)
ALT SERPL W P-5'-P-CCNC: 22 U/L (ref 7–45)
ANION GAP SERPL CALC-SCNC: 14 MMOL/L (ref 10–20)
AST SERPL W P-5'-P-CCNC: 25 U/L (ref 9–39)
BILIRUB SERPL-MCNC: 0.6 MG/DL (ref 0–1.2)
BUN SERPL-MCNC: 18 MG/DL (ref 6–23)
CALCIUM SERPL-MCNC: 9.6 MG/DL (ref 8.6–10.3)
CHLORIDE SERPL-SCNC: 108 MMOL/L (ref 98–107)
CO2 SERPL-SCNC: 24 MMOL/L (ref 21–32)
CREAT SERPL-MCNC: 0.92 MG/DL (ref 0.5–1.05)
EGFRCR SERPLBLD CKD-EPI 2021: 69 ML/MIN/1.73M*2
GLUCOSE SERPL-MCNC: 98 MG/DL (ref 74–99)
POTASSIUM SERPL-SCNC: 4 MMOL/L (ref 3.5–5.3)
PROT SERPL-MCNC: 7.5 G/DL (ref 6.4–8.2)
SODIUM SERPL-SCNC: 142 MMOL/L (ref 136–145)

## 2024-09-13 PROCEDURE — 1159F MED LIST DOCD IN RCRD: CPT | Performed by: INTERNAL MEDICINE

## 2024-09-13 PROCEDURE — 1160F RVW MEDS BY RX/DR IN RCRD: CPT | Performed by: INTERNAL MEDICINE

## 2024-09-13 PROCEDURE — G0008 ADMIN INFLUENZA VIRUS VAC: HCPCS | Performed by: INTERNAL MEDICINE

## 2024-09-13 PROCEDURE — 1036F TOBACCO NON-USER: CPT | Performed by: INTERNAL MEDICINE

## 2024-09-13 PROCEDURE — 99214 OFFICE O/P EST MOD 30 MIN: CPT | Performed by: INTERNAL MEDICINE

## 2024-09-13 PROCEDURE — 3079F DIAST BP 80-89 MM HG: CPT | Performed by: INTERNAL MEDICINE

## 2024-09-13 PROCEDURE — 1123F ACP DISCUSS/DSCN MKR DOCD: CPT | Performed by: INTERNAL MEDICINE

## 2024-09-13 PROCEDURE — 3075F SYST BP GE 130 - 139MM HG: CPT | Performed by: INTERNAL MEDICINE

## 2024-09-13 PROCEDURE — 3008F BODY MASS INDEX DOCD: CPT | Performed by: INTERNAL MEDICINE

## 2024-09-13 PROCEDURE — 90662 IIV NO PRSV INCREASED AG IM: CPT | Performed by: INTERNAL MEDICINE

## 2024-09-13 RX ORDER — ISOSORBIDE MONONITRATE 30 MG/1
30 TABLET, EXTENDED RELEASE ORAL DAILY
Qty: 90 TABLET | Refills: 3 | Status: SHIPPED | OUTPATIENT
Start: 2024-09-13 | End: 2025-09-13

## 2024-09-13 RX ORDER — ESCITALOPRAM OXALATE 20 MG/1
20 TABLET ORAL DAILY
Qty: 30 TABLET | Refills: 5 | Status: SHIPPED | OUTPATIENT
Start: 2024-09-13

## 2024-09-13 ASSESSMENT — ENCOUNTER SYMPTOMS
NAUSEA: 0
MUSCLE TENSION: 0
DIZZINESS: 0
ARTHRALGIAS: 1
NERVOUS/ANXIOUS: 1
FEELING OF CHOKING: 0
ABDOMINAL PAIN: 0
CONSTITUTIONAL NEGATIVE: 1
DEPRESSED MOOD: 1
INSOMNIA: 1
DECREASED CONCENTRATION: 0
RESPIRATORY NEGATIVE: 1

## 2024-09-13 NOTE — PROGRESS NOTES
Subjective   Patient ID: Audrey Estrada is a 64 y.o. female who presents for Follow-up (3 mo fu).  Anxiety  Presents for follow-up visit. Symptoms include chest pain, depressed mood, insomnia and nervous/anxious behavior. Patient reports no decreased concentration, dizziness, feeling of choking, muscle tension or nausea. Symptoms occur most days. The quality of sleep is fair.       Heart Problem  This is a new problem. The current episode started more than 1 month ago. The problem occurs rarely. The problem has been resolved. Pertinent negatives include no abdominal pain, anorexia, arthralgias, congestion, diaphoresis, headaches, nausea, neck pain, numbness or weakness. The treatment provided moderate relief. Has episodes of chest pains  Hypertension  This is a chronic problem. The current episode started more than 1 year ago. The problem has been waxing and waning since onset. The problem is controlled. Pertinent negatives include no blurred vision, headaches or neck pain. Past treatments include beta blockers and angiotensin blockers. The current treatment provides significant improvement. There are no compliance problems.  Hypertensive end-organ damage includes CAD/MI  Past Medical History  Past Medical History:   Diagnosis Date    Hyperlipidemia        Social History  Social History     Tobacco Use    Smoking status: Former     Current packs/day: 0.00     Average packs/day: 1 pack/day for 15.0 years (15.0 ttl pk-yrs)     Types: Cigarettes     Start date: 2008     Quit date: 2023     Years since quittin.9    Smokeless tobacco: Never   Vaping Use    Vaping status: Never Used   Substance Use Topics    Alcohol use: Never    Drug use: Never       Family History     Family History   Problem Relation Name Age of Onset    Heart attack Father  65    Heart disease Paternal Grandmother  65       Allergies:  No Known Allergies     Outpatient Medications:  Current Outpatient Medications   Medication Instructions     amLODIPine (Norvasc) 5 mg tablet Take one tablet as needed once a day if SBP more then 160 and DBP more then 90 mm Hg    aspirin 81 mg, oral, Daily    atorvastatin (LIPITOR) 80 mg, oral, Daily    carvedilol (COREG) 25 mg, oral, 2 times daily    cholecalciferol (VITAMIN D-3) 25 mcg, oral, Daily    escitalopram (LEXAPRO) 10 mg, oral, Daily    ferrous sulfate 325 (65 Fe) MG EC tablet 1 tablet, oral, Daily RT, Take on Mon, Wed, and Fri    losartan (COZAAR) 100 mg, oral, Daily    magnesium oxide (MAGOX) 400 mg, oral, Daily    omega-3 acid ethyl esters (Lovaza) 1 gram capsule TAKE 1 CAPSULE BY MOUTH TWICE A DAY    omeprazole OTC (PRILOSEC OTC) 1 mg, oral, Daily before breakfast, Do not crush, chew, or split.    potassium chloride CR (Klor-Con) 10 mEq ER tablet 10 mEq, oral, Daily, Do not crush, chew, or split.    ticagrelor (BRILINTA) 90 mg, oral, 2 times daily        Review of Systems   Constitutional: Negative.    HENT: Negative.     Respiratory: Negative.     Cardiovascular:  Positive for chest pain. Negative for leg swelling.   Gastrointestinal:  Negative for abdominal pain and nausea.   Musculoskeletal:  Positive for arthralgias.   Neurological:  Negative for dizziness.   Psychiatric/Behavioral:  Negative for decreased concentration. The patient is nervous/anxious and has insomnia.          Objective       Physical Exam  Vitals reviewed.   Constitutional:       Appearance: Normal appearance. She is obese.   HENT:      Head: Normocephalic.   Eyes:      Conjunctiva/sclera: Conjunctivae normal.   Cardiovascular:      Rate and Rhythm: Normal rate and regular rhythm.      Pulses: Normal pulses.   Pulmonary:      Effort: Pulmonary effort is normal.      Breath sounds: Normal breath sounds.   Abdominal:      Palpations: Abdomen is soft.   Musculoskeletal:         General: Tenderness present. Normal range of motion.      Cervical back: Neck supple.   Skin:     General: Skin is warm and dry.   Neurological:      General:  "No focal deficit present.   Psychiatric:         Mood and Affect: Mood is anxious.     /80 (BP Location: Left arm, Patient Position: Sitting, BP Cuff Size: Adult)   Pulse 80   Temp 35.9 °C (96.6 °F) (Temporal)   Ht 1.499 m (4' 11\")   Wt 83.5 kg (184 lb)   BMI 37.16 kg/m²      Assessment/Plan   Problem List Items Addressed This Visit       HTN (hypertension) - Primary    Hyperlipidemia    Coronary artery disease involving native coronary artery of native heart with unstable angina pectoris (Multi)    MEMO (generalized anxiety disorder)     Other Visit Diagnoses       Flu vaccine need            Patient is very nervous, after having myocardial infarction she really gets nervous and upset about any episodes of chest pain, she feels like she has a chest pains off and on, first of all she needs to control her anxiety I told her, we have at least 5 PCI's last year, she is on Brilinta, now they are looking to have a knee replacement, there is a joint space narrowing at the right knee, I told that if she is in pain and she has a limitation of activities then she can go for knee replacement, they tried all conservative treatment.  If she is going for knee replacement then she will require stress test I told her and she needs a stress test it will be Lexiscan stress test and there is no harm in doing Lexiscan stress test which has been ordered by cardiologist I told her, increase Lexapro to 20 mg, add isosorbide mononitrate 30 mg, continue statins, BP readings are stable, I counseled her that somehow she needs to take a fear out of her mind about her heart will be having trouble or she will get another heart attack with any chest pains, chest pains are not frequent, they are not typical, it is not predictable that what can happen but she cannot live in the fear, she needs to enjoy her life and she needs to be active as much as she can, if knee replacement is contemplated then she will require preop cardiology " clearance and myself.  Brilinta therapy will be completed after 12 months of PCI.  Patient was in tears, she needs a more anxiolytic and Lexapro dose will be helpful hopefully.  Follow-up in 3 months.  Flu vaccine was suggested and given.

## 2024-09-20 NOTE — TELEPHONE ENCOUNTER
Spoke with patient. She is agreeable and scheduled for MPL on 9/24/24.     Will postpone this encounter to follow up on results.

## 2024-09-20 NOTE — TELEPHONE ENCOUNTER
Nish Parnell MD  You18 hours ago (7:12 PM)     Ok - check again if she would be willing to do the lexiscan for stress test, if not we will just have to do a cardiac cath

## 2024-09-24 ENCOUNTER — HOSPITAL ENCOUNTER (OUTPATIENT)
Dept: CARDIOLOGY | Facility: CLINIC | Age: 64
Discharge: HOME | End: 2024-09-24
Payer: COMMERCIAL

## 2024-09-24 ENCOUNTER — HOSPITAL ENCOUNTER (OUTPATIENT)
Dept: RADIOLOGY | Facility: CLINIC | Age: 64
Discharge: HOME | End: 2024-09-24
Payer: COMMERCIAL

## 2024-09-24 DIAGNOSIS — I25.110 CORONARY ARTERY DISEASE INVOLVING NATIVE CORONARY ARTERY OF NATIVE HEART WITH UNSTABLE ANGINA PECTORIS: ICD-10-CM

## 2024-09-24 DIAGNOSIS — R07.89 CHEST TIGHTNESS: ICD-10-CM

## 2024-09-24 DIAGNOSIS — I25.110 CORONARY ARTERY DISEASE INVOLVING NATIVE CORONARY ARTERY OF NATIVE HEART WITH UNSTABLE ANGINA PECTORIS: Primary | ICD-10-CM

## 2024-09-24 PROCEDURE — 2500000004 HC RX 250 GENERAL PHARMACY W/ HCPCS (ALT 636 FOR OP/ED): Performed by: STUDENT IN AN ORGANIZED HEALTH CARE EDUCATION/TRAINING PROGRAM

## 2024-09-24 PROCEDURE — A9502 TC99M TETROFOSMIN: HCPCS | Performed by: STUDENT IN AN ORGANIZED HEALTH CARE EDUCATION/TRAINING PROGRAM

## 2024-09-24 PROCEDURE — 78452 HT MUSCLE IMAGE SPECT MULT: CPT

## 2024-09-24 PROCEDURE — 93017 CV STRESS TEST TRACING ONLY: CPT

## 2024-09-24 PROCEDURE — 3430000001 HC RX 343 DIAGNOSTIC RADIOPHARMACEUTICALS: Performed by: STUDENT IN AN ORGANIZED HEALTH CARE EDUCATION/TRAINING PROGRAM

## 2024-09-24 RX ORDER — REGADENOSON 0.08 MG/ML
0.4 INJECTION, SOLUTION INTRAVENOUS ONCE
Status: COMPLETED | OUTPATIENT
Start: 2024-09-24 | End: 2024-09-24

## 2024-09-27 NOTE — TELEPHONE ENCOUNTER
Patient completed Nuclear on 9/24/24:  Normal Lexiscan Myoview cardiac perfusion stress test.  No evidence of ischemia or myocardial infarction by perfusion imaging.  Normal left ventricular systolic function, ejection fraction 65%.  Noninvasive risk stratification-low risk.  Fair to previous Lexiscan myocardial perfusion stress test July 19, 2021 there is no significant interval change. The previous study was normal. Diaphragmatic attenuation artifact is present decreasing sensitivity and specificity is study. Clinical correlation advised.    Patient was seen by Dr. Mendes in July and complained of shortness of breath at that time. Nuclear was ordered for investigation. Since then, patient has scheduled Right knee with Dr. Ellis in November and is asking for clearance.     No clearance form for signature in this office.   Routing to Dr. Mendes for reviewing.

## 2024-10-01 NOTE — TELEPHONE ENCOUNTER
Will postpone this note to review with Dr. Nish Mendes MD [facility] on Monday 10/7 with Dr. Nish Mendes MD [facility]

## 2024-10-07 NOTE — TELEPHONE ENCOUNTER
Per Dr. Nish Mendes MD [facility], she would like to see patient in office to review nuclear results and follow up on SOB symptoms. Please schedule soon. Thank you.

## 2024-10-08 ENCOUNTER — TELEPHONE (OUTPATIENT)
Dept: CARDIOLOGY | Facility: CLINIC | Age: 64
End: 2024-10-08
Payer: COMMERCIAL

## 2024-10-10 RX ORDER — TRANEXAMIC ACID 650 MG/1
1950 TABLET ORAL ONCE
OUTPATIENT
Start: 2024-10-10 | End: 2024-10-10

## 2024-10-10 RX ORDER — ACETAMINOPHEN 325 MG/1
975 TABLET ORAL ONCE
OUTPATIENT
Start: 2024-10-10 | End: 2024-10-10

## 2024-10-10 RX ORDER — CEFAZOLIN SODIUM 2 G/50ML
2 SOLUTION INTRAVENOUS ONCE
OUTPATIENT
Start: 2024-10-10 | End: 2024-10-10

## 2024-10-10 RX ORDER — GABAPENTIN 300 MG/1
300 CAPSULE ORAL ONCE
OUTPATIENT
Start: 2024-10-10 | End: 2024-10-10

## 2024-10-10 RX ORDER — CELECOXIB 200 MG/1
200 CAPSULE ORAL ONCE
OUTPATIENT
Start: 2024-10-10 | End: 2024-10-10

## 2024-10-14 ENCOUNTER — LAB (OUTPATIENT)
Dept: LAB | Facility: LAB | Age: 64
End: 2024-10-14
Payer: COMMERCIAL

## 2024-10-14 ENCOUNTER — APPOINTMENT (OUTPATIENT)
Dept: CARDIOLOGY | Facility: CLINIC | Age: 64
End: 2024-10-14
Payer: COMMERCIAL

## 2024-10-14 VITALS
WEIGHT: 181.9 LBS | HEART RATE: 56 BPM | SYSTOLIC BLOOD PRESSURE: 124 MMHG | BODY MASS INDEX: 36.74 KG/M2 | DIASTOLIC BLOOD PRESSURE: 64 MMHG

## 2024-10-14 DIAGNOSIS — Z98.61 CAD S/P PERCUTANEOUS CORONARY ANGIOPLASTY: ICD-10-CM

## 2024-10-14 DIAGNOSIS — I25.110 CORONARY ARTERY DISEASE INVOLVING NATIVE CORONARY ARTERY OF NATIVE HEART WITH UNSTABLE ANGINA PECTORIS: ICD-10-CM

## 2024-10-14 DIAGNOSIS — E78.2 MIXED HYPERLIPIDEMIA: ICD-10-CM

## 2024-10-14 DIAGNOSIS — R06.02 SHORTNESS OF BREATH: ICD-10-CM

## 2024-10-14 DIAGNOSIS — I25.10 CAD S/P PERCUTANEOUS CORONARY ANGIOPLASTY: ICD-10-CM

## 2024-10-14 DIAGNOSIS — F41.1 GAD (GENERALIZED ANXIETY DISORDER): ICD-10-CM

## 2024-10-14 DIAGNOSIS — I10 PRIMARY HYPERTENSION: ICD-10-CM

## 2024-10-14 DIAGNOSIS — Z87.891 FORMER CIGARETTE SMOKER: ICD-10-CM

## 2024-10-14 DIAGNOSIS — Z01.818 PRE-OPERATIVE CLEARANCE: ICD-10-CM

## 2024-10-14 PROBLEM — E66.812 CLASS 2 SEVERE OBESITY DUE TO EXCESS CALORIES WITH SERIOUS COMORBIDITY AND BODY MASS INDEX (BMI) OF 37.0 TO 37.9 IN ADULT: Status: RESOLVED | Noted: 2024-05-31 | Resolved: 2024-10-14

## 2024-10-14 PROBLEM — E66.01 CLASS 2 SEVERE OBESITY DUE TO EXCESS CALORIES WITH SERIOUS COMORBIDITY AND BODY MASS INDEX (BMI) OF 37.0 TO 37.9 IN ADULT: Status: RESOLVED | Noted: 2024-05-31 | Resolved: 2024-10-14

## 2024-10-14 LAB
BASOPHILS # BLD AUTO: 0.07 X10*3/UL (ref 0–0.1)
BASOPHILS NFR BLD AUTO: 0.9 %
CHOLEST SERPL-MCNC: 115 MG/DL (ref 0–199)
CHOLESTEROL/HDL RATIO: 2.9
EOSINOPHIL # BLD AUTO: 0.33 X10*3/UL (ref 0–0.7)
EOSINOPHIL NFR BLD AUTO: 4 %
ERYTHROCYTE [DISTWIDTH] IN BLOOD BY AUTOMATED COUNT: 14.6 % (ref 11.5–14.5)
HCT VFR BLD AUTO: 36 % (ref 36–46)
HDLC SERPL-MCNC: 40 MG/DL
HGB BLD-MCNC: 11 G/DL (ref 12–16)
IMM GRANULOCYTES # BLD AUTO: 0.03 X10*3/UL (ref 0–0.7)
IMM GRANULOCYTES NFR BLD AUTO: 0.4 % (ref 0–0.9)
LDLC SERPL CALC-MCNC: 35 MG/DL
LYMPHOCYTES # BLD AUTO: 1.36 X10*3/UL (ref 1.2–4.8)
LYMPHOCYTES NFR BLD AUTO: 16.6 %
MAGNESIUM SERPL-MCNC: 1.54 MG/DL (ref 1.6–2.4)
MCH RBC QN AUTO: 25.9 PG (ref 26–34)
MCHC RBC AUTO-ENTMCNC: 30.6 G/DL (ref 32–36)
MCV RBC AUTO: 85 FL (ref 80–100)
MONOCYTES # BLD AUTO: 0.52 X10*3/UL (ref 0.1–1)
MONOCYTES NFR BLD AUTO: 6.3 %
NEUTROPHILS # BLD AUTO: 5.9 X10*3/UL (ref 1.2–7.7)
NEUTROPHILS NFR BLD AUTO: 71.8 %
NON HDL CHOLESTEROL: 75 MG/DL (ref 0–149)
NRBC BLD-RTO: 0 /100 WBCS (ref 0–0)
PLATELET # BLD AUTO: 239 X10*3/UL (ref 150–450)
RBC # BLD AUTO: 4.24 X10*6/UL (ref 4–5.2)
TRIGL SERPL-MCNC: 200 MG/DL (ref 0–149)
VLDL: 40 MG/DL (ref 0–40)
WBC # BLD AUTO: 8.2 X10*3/UL (ref 4.4–11.3)

## 2024-10-14 PROCEDURE — 80061 LIPID PANEL: CPT

## 2024-10-14 PROCEDURE — 1036F TOBACCO NON-USER: CPT | Performed by: STUDENT IN AN ORGANIZED HEALTH CARE EDUCATION/TRAINING PROGRAM

## 2024-10-14 PROCEDURE — 99214 OFFICE O/P EST MOD 30 MIN: CPT | Performed by: STUDENT IN AN ORGANIZED HEALTH CARE EDUCATION/TRAINING PROGRAM

## 2024-10-14 PROCEDURE — 83735 ASSAY OF MAGNESIUM: CPT

## 2024-10-14 PROCEDURE — 1159F MED LIST DOCD IN RCRD: CPT | Performed by: STUDENT IN AN ORGANIZED HEALTH CARE EDUCATION/TRAINING PROGRAM

## 2024-10-14 PROCEDURE — 85025 COMPLETE CBC W/AUTO DIFF WBC: CPT

## 2024-10-14 PROCEDURE — 1123F ACP DISCUSS/DSCN MKR DOCD: CPT | Performed by: STUDENT IN AN ORGANIZED HEALTH CARE EDUCATION/TRAINING PROGRAM

## 2024-10-14 PROCEDURE — 3074F SYST BP LT 130 MM HG: CPT | Performed by: STUDENT IN AN ORGANIZED HEALTH CARE EDUCATION/TRAINING PROGRAM

## 2024-10-14 PROCEDURE — 36415 COLL VENOUS BLD VENIPUNCTURE: CPT

## 2024-10-14 PROCEDURE — 3078F DIAST BP <80 MM HG: CPT | Performed by: STUDENT IN AN ORGANIZED HEALTH CARE EDUCATION/TRAINING PROGRAM

## 2024-10-14 NOTE — PROGRESS NOTES
Chief complaint:   Chief Complaint   Patient presents with    Follow-up     Patient here to discuss testing results        History of Present Illness  Audrey Estrada is a 64 y.o. year old female patient with history of smoking, hyperlipidemia, and hypertension, CAD with STEMI status post PCI to circumflex and RCA in 10/2023 who is presenting for cardiovascular follow-up  In last visit patient reported having shortness of breath with heavy long walks and heavy exertions and intermittent chest discomforts when resting at night.  Nuclear stress test was obtained that showed no evidence of ischemia.  She was recently started on Imdur 30 mg daily by her PCP.  Reports since then no further episode of chest discomfort and feeling less dyspnea on exertion.  She denies chest pain, palpitations or syncope.  She is undergoing workup for right knee replacement  given severe symptomatic right knee osteoarthritis. States due to arthritis in her knee, she is limited in terms of physical activity daily. Also discussed that patient feels some form of depression might be etiology of her fatigue and overall mood.    In terms of shortness of breath- states she can grocery shop with a shopping cart and not have any shortness of breath, but going long distances like to a football game she has some degree of shortness of breath.         Social History     Tobacco Use    Smoking status: Former     Current packs/day: 0.00     Average packs/day: 1 pack/day for 15.0 years (15.0 ttl pk-yrs)     Types: Cigarettes     Start date: 2008     Quit date: 2023     Years since quittin.0    Smokeless tobacco: Never   Vaping Use    Vaping status: Never Used   Substance Use Topics    Alcohol use: Never    Drug use: Never       Outpatient Medications:  Current Outpatient Medications   Medication Instructions    amLODIPine (Norvasc) 5 mg tablet Take one tablet as needed once a day if SBP more then 160 and DBP more then 90 mm Hg    aspirin  81 mg, oral, Daily    atorvastatin (LIPITOR) 80 mg, oral, Daily    carvedilol (COREG) 25 mg, oral, 2 times daily    cholecalciferol (VITAMIN D-3) 25 mcg, oral, Daily    escitalopram (LEXAPRO) 20 mg, oral, Daily    ferrous sulfate 325 (65 Fe) MG EC tablet 1 tablet, oral, Daily RT, Take on Mon, Wed, and Fri    isosorbide mononitrate ER (IMDUR) 30 mg, oral, Daily, Do not crush or chew.    losartan (COZAAR) 100 mg, oral, Daily    magnesium oxide (MAGOX) 400 mg, oral, Daily    omega-3 acid ethyl esters (Lovaza) 1 gram capsule TAKE 1 CAPSULE BY MOUTH TWICE A DAY    omeprazole OTC (PRILOSEC OTC) 1 mg, oral, Daily before breakfast, Do not crush, chew, or split.    potassium chloride CR (Klor-Con) 10 mEq ER tablet 10 mEq, oral, Daily, Do not crush, chew, or split.    ticagrelor (BRILINTA) 90 mg, oral, 2 times daily         Vitals:  Vitals:    10/14/24 0813   BP: 124/64   Pulse: 56       Physical Exam:  General: NAD, well-appearing  HEENT: moist mucous membranes, no jaundice  Neck: No JVD, no carotid bruit  Lungs: CTA luisa, no wheezing or rales  Cardiac: RRR, no murmurs  Abdomen: soft, non-tender, non-distended  Extremities: 2+ radial pulses, no edema, palpable pulses  Skin: warm, dry, no wound  Neurologic: AAOx3,  no focal deficits       Reviewed Study(s):    Carotid Ultrasound 12/4/23:  Utilizing the peak systolic velocities, the estimated degree of  stenosis within the internal carotid arteries is 50-69% on the left  and less than 50% on the right.      Lipid 1/2024:    HDL 34  LDL 29  Tri 232     Left Heart Cath:  10/1/2023- IVUS CARLOS ALBERTO to mid circumflex and Ostium of obtuse marginal  10/2/2023- IVUS CARLOS ALBERTO to mid to proximal RCA  10/3/2023- IVUS Shockwave IVL to the under expanded ostial RCA Stent; CARLOS ALBERTO to severe stenosis of mid to distal RPL Branch.   10/6/2023- Recurrent complaints of Chest Pain in post op stage, patent coronary stents with RUBI 3 flow in all vessels and no residual significant stenosis.      Echo:  10/1/2023:   1. Left ventricular systolic function is normal with a 60% estimated ejection fraction.   2. Spectral Doppler shows an impaired relaxation pattern of left ventricular diastolic filling.   3. There is evidence of mild mitral valve stenosis.   4. There is moderate mitral annular calcification.   5. Mild tricuspid regurgitation is visualized.   6. Mild aortic valve stenosis.   7. The main pulmonary artery is normal in size, and position, with normal bifurcation into the left and right pulmonary arteries.     Lexiscan Nuclear: 9/24/2024:  Normal Lexiscan Myoview cardiac perfusion stress test.  No evidence of ischemia or myocardial infarction by perfusion imaging.  Normal left ventricular systolic function, ejection fraction 65%.  Noninvasive risk stratification-low risk.  Fair to previous Lexiscan myocardial perfusion stress test July 19, 2021 there is no significant interval change. The previous study was  normal. Diaphragmatic attenuation artifact is present decreasing  sensitivity and specificity is study. Clinical correlation advised.    4 week Event Monitor 3/2024:  19 patient triggered events occurred. These events correlated with normal sinus rhythm, motion artifact, or normal sinus rhythm with isolated PVC. Heart rates varied from 65 - 90 bpm. No symptoms and no activity were demarcated.     Assessment/Plan   Diagnoses and all orders for this visit:  Pre-operative clearance  CAD S/P percutaneous coronary angioplasty  Primary hypertension  Mixed hyperlipidemia  BMI 36.0-36.9,adult  MEMO (generalized anxiety disorder)  Former cigarette smoker  Shortness of breath      #Coronary Artery Disease, s/p PCI  #Preoperative Clearance   - CAD with inferior STEMI status post PCI to circumflex and RCA in 10/2023   - Patient has now completed 1 year of DAPT with aspirin and Brilinta.  Okay to stop Brilinta for 5 days prior to knee surgery.  Continue aspirin without any interruption.  Restart Brilinta after  surgery  -From a cardiovascular standpoint patient is at average to elevated risk of cardiovascular implications from any surgery given prior history of CAD and MI.  However no further interventions will reduce this risk.  Her current stress test was unremarkable without evidence of ischemia and she is currently asymptomatic from cardiovascular standpoint    # Hyperlipidemia   - LDL at goal for patient (history CAD with CARLOS ALBERTO)   - Tolerating high dose oral statin, Lipitor 80mg     # Asymptomatic mild carotid Disease, bilaterally  -Continue medical therapy  - Will repeat imaging in 1 year for follow up; January 2025       RTC 6 months     Nish Mendes MD [facility]  Interventional Cardiology  Endovascular Interventions  [email or web address]    **Disclaimer: This note was dictated by speech recognition, and every effort has been made to prevent any error in transcription, however minor errors may be present**

## 2024-10-14 NOTE — PATIENT INSTRUCTIONS
Patient to follow up in 6 months with Dr. Nish Mendes MD [facility]     Please keep plan to repeat Carotid Ultrasound in January, office will schedule     You are cleared for knee surgery from cardiac standpoint. Please hold Brilinta for 5 days prior to procedure.     No other changes today.   Continue same medications and treatments.   Patient educated on proper medication use.   Patient educated on risk factor modification.   Please bring any lab results from other providers / physicians to your next appointment.     Please bring all medicines, vitamins, and herbal supplements with you when you come to the office.     Prescriptions will not be filled unless you are compliant with your follow up appointments or have a follow up appointment scheduled as per instruction of your physician. Refills should be requested at the time of your visit.    IMarc RN am scribing for and in the presence of Dr. Nish Mendse MD [facility]

## 2024-10-18 ENCOUNTER — APPOINTMENT (OUTPATIENT)
Dept: PRIMARY CARE | Facility: CLINIC | Age: 64
End: 2024-10-18
Payer: COMMERCIAL

## 2024-10-18 VITALS
BODY MASS INDEX: 36.08 KG/M2 | DIASTOLIC BLOOD PRESSURE: 67 MMHG | WEIGHT: 179 LBS | SYSTOLIC BLOOD PRESSURE: 100 MMHG | HEART RATE: 67 BPM | HEIGHT: 59 IN | TEMPERATURE: 95.9 F

## 2024-10-18 DIAGNOSIS — I10 PRIMARY HYPERTENSION: ICD-10-CM

## 2024-10-18 DIAGNOSIS — Z01.818 PRE-OPERATIVE CLEARANCE: Primary | ICD-10-CM

## 2024-10-18 DIAGNOSIS — I25.110 CORONARY ARTERY DISEASE INVOLVING NATIVE CORONARY ARTERY OF NATIVE HEART WITH UNSTABLE ANGINA PECTORIS: ICD-10-CM

## 2024-10-18 DIAGNOSIS — M17.11 UNILATERAL PRIMARY OSTEOARTHRITIS, RIGHT KNEE: ICD-10-CM

## 2024-10-18 PROCEDURE — 1160F RVW MEDS BY RX/DR IN RCRD: CPT | Performed by: INTERNAL MEDICINE

## 2024-10-18 PROCEDURE — 3008F BODY MASS INDEX DOCD: CPT | Performed by: INTERNAL MEDICINE

## 2024-10-18 PROCEDURE — 3078F DIAST BP <80 MM HG: CPT | Performed by: INTERNAL MEDICINE

## 2024-10-18 PROCEDURE — 1159F MED LIST DOCD IN RCRD: CPT | Performed by: INTERNAL MEDICINE

## 2024-10-18 PROCEDURE — 3074F SYST BP LT 130 MM HG: CPT | Performed by: INTERNAL MEDICINE

## 2024-10-18 PROCEDURE — 1036F TOBACCO NON-USER: CPT | Performed by: INTERNAL MEDICINE

## 2024-10-18 PROCEDURE — 99214 OFFICE O/P EST MOD 30 MIN: CPT | Performed by: INTERNAL MEDICINE

## 2024-10-18 PROCEDURE — 1123F ACP DISCUSS/DSCN MKR DOCD: CPT | Performed by: INTERNAL MEDICINE

## 2024-10-18 NOTE — PROGRESS NOTES
Subjective   Audrey Estrada is a 64 y.o. female who presents to the office today for a preoperative consultation at the request of surgeon DR Ellis who plans on performing Rt TKR on November 8. This consultation is requested for the specific conditions prompting preoperative evaluation (i.e. because of potential effect on operative risk): had CAD and PCI. Planned anesthesia: regional and spinal. The patient has the following known anesthesia issues:  none listed . Patients bleeding risk: no recent abnormal bleeding. Patient does not have objections to receiving blood products if needed.    Review of Systems    Objective   Physical Exam     Predictors of intubation difficulty:   Morbid obesity? yes - BMI more then 35   Anatomically abnormal facies? no   Prominent incisors? no   Receding mandible? no   Short, thick neck? no   Neck range of motion: normal   Mallampati score: II (hard and soft palate, upper portion of tonsils and uvula visible)   Thyromental distance: < 6cm   Mouth opening: 3cm   Dentition: No chipped, loose, or missing teeth.    Cardiographics  ECG: normal sinus rhythm, no blocks or conduction defects, no ischemic changes  Echocardiogram:  done several months ago    Imaging  Chest x-ray:  none recent      Lab Review   not applicable  PAT to be done .    Assessment/Plan   64 y.o. female with planned surgery as above.    Known risk factors for perioperative complications: None    Difficulty with intubation is not anticipated.  Problem List Items Addressed This Visit       HTN (hypertension)    Coronary artery disease involving native coronary artery of native heart with unstable angina pectoris    Unilateral primary osteoarthritis, right knee    Pre-operative clearance - Primary      Cardiac Risk Estimation: low moderate    Current medications which may produce withdrawal symptoms if withheld perioperatively: brilinta  Patient came for preop evaluation for upcoming right total knee replacement, recent  laboratories were reviewed, hemoglobin is 11, stress test was done, cardiology clearance was obtained, Brilinta will be on hold 5 days prior, aspirin hold as per cardiology.  Beta-blocker which is a carvedilol has to be taken 12.5 mg on the day of surgery with sips of water, patient is to be motivated, since we started isosorbide her chest pains has been subsided.  Lexapro dose will not be altered she needs to get well and better after knee replacement I told her, in view of normal stress test and cardiology clearance given we can have a medical clearance for this patient to undergo surgery as listed.  Magnesium was slightly low, magnesium supplement to be continued, potassium supplement to be continued, iron supplements can be taken off in the future, no further coronary event seen or expected, clearance was issued.  1. Preoperative workup as follows cardiac stress testing to evaluate known coronary disease (done), ECG, hemoglobin, hematocrit, electrolytes, creatinine, glucose, coagulation studies.  2. Change in medication regimen before surgery:  brilinta stop 5 days before, ASA as per cardiology .  3. Prophylaxis for cardiac events with perioperative beta-blockers: should be considered, specific regimen per anesthesia.  4. Invasive hemodynamic monitoring perioperatively: at the discretion of anesthesiologist.  5. Deep vein thrombosis prophylaxis postoperatively:regimen to be chosen by surgical team.  6. Surveillance for postoperative MI with ECG immediately postoperatively and on postoperative days 1 and 2 AND troponin levels 24 hours postoperatively and on day 4 or hospital discharge (whichever comes first): at the discretion of anesthesiologist.  7. Other measures:  postop telemetry monitoring

## 2024-10-25 ENCOUNTER — HOSPITAL ENCOUNTER (OUTPATIENT)
Dept: CARDIOLOGY | Facility: HOSPITAL | Age: 64
Discharge: HOME | End: 2024-10-25
Payer: COMMERCIAL

## 2024-10-25 ENCOUNTER — PRE-ADMISSION TESTING (OUTPATIENT)
Dept: PREADMISSION TESTING | Facility: HOSPITAL | Age: 64
End: 2024-10-25
Payer: COMMERCIAL

## 2024-10-25 VITALS
WEIGHT: 181.66 LBS | BODY MASS INDEX: 36.62 KG/M2 | RESPIRATION RATE: 20 BRPM | HEART RATE: 56 BPM | OXYGEN SATURATION: 98 % | HEIGHT: 59 IN

## 2024-10-25 DIAGNOSIS — M17.11 UNILATERAL PRIMARY OSTEOARTHRITIS, RIGHT KNEE: ICD-10-CM

## 2024-10-25 LAB
ALBUMIN SERPL BCP-MCNC: 4.2 G/DL (ref 3.4–5)
ALP SERPL-CCNC: 49 U/L (ref 33–136)
ALT SERPL W P-5'-P-CCNC: 20 U/L (ref 7–45)
ANION GAP SERPL CALC-SCNC: 13 MMOL/L (ref 10–20)
APTT PPP: 30 SECONDS (ref 27–38)
AST SERPL W P-5'-P-CCNC: 18 U/L (ref 9–39)
BASOPHILS # BLD AUTO: 0.08 X10*3/UL (ref 0–0.1)
BASOPHILS NFR BLD AUTO: 1 %
BILIRUB SERPL-MCNC: 0.5 MG/DL (ref 0–1.2)
BUN SERPL-MCNC: 23 MG/DL (ref 6–23)
CALCIUM SERPL-MCNC: 9.2 MG/DL (ref 8.6–10.3)
CHLORIDE SERPL-SCNC: 107 MMOL/L (ref 98–107)
CO2 SERPL-SCNC: 25 MMOL/L (ref 21–32)
CREAT SERPL-MCNC: 1.06 MG/DL (ref 0.5–1.05)
EGFRCR SERPLBLD CKD-EPI 2021: 58 ML/MIN/1.73M*2
EOSINOPHIL # BLD AUTO: 0.29 X10*3/UL (ref 0–0.7)
EOSINOPHIL NFR BLD AUTO: 3.5 %
ERYTHROCYTE [DISTWIDTH] IN BLOOD BY AUTOMATED COUNT: 14.6 % (ref 11.5–14.5)
EST. AVERAGE GLUCOSE BLD GHB EST-MCNC: 120 MG/DL
GLUCOSE SERPL-MCNC: 128 MG/DL (ref 74–99)
HBA1C MFR BLD: 5.8 %
HCT VFR BLD AUTO: 36.2 % (ref 36–46)
HGB BLD-MCNC: 11.1 G/DL (ref 12–16)
IMM GRANULOCYTES # BLD AUTO: 0.04 X10*3/UL (ref 0–0.7)
IMM GRANULOCYTES NFR BLD AUTO: 0.5 % (ref 0–0.9)
INR PPP: 1.1 (ref 0.9–1.1)
LYMPHOCYTES # BLD AUTO: 1.35 X10*3/UL (ref 1.2–4.8)
LYMPHOCYTES NFR BLD AUTO: 16.4 %
MCH RBC QN AUTO: 25.9 PG (ref 26–34)
MCHC RBC AUTO-ENTMCNC: 30.7 G/DL (ref 32–36)
MCV RBC AUTO: 84 FL (ref 80–100)
MONOCYTES # BLD AUTO: 0.51 X10*3/UL (ref 0.1–1)
MONOCYTES NFR BLD AUTO: 6.2 %
NEUTROPHILS # BLD AUTO: 5.94 X10*3/UL (ref 1.2–7.7)
NEUTROPHILS NFR BLD AUTO: 72.4 %
NRBC BLD-RTO: 0 /100 WBCS (ref 0–0)
PLATELET # BLD AUTO: 225 X10*3/UL (ref 150–450)
POTASSIUM SERPL-SCNC: 4.4 MMOL/L (ref 3.5–5.3)
PROT SERPL-MCNC: 7.2 G/DL (ref 6.4–8.2)
PROTHROMBIN TIME: 11.9 SECONDS (ref 9.8–12.8)
RBC # BLD AUTO: 4.29 X10*6/UL (ref 4–5.2)
SODIUM SERPL-SCNC: 141 MMOL/L (ref 136–145)
WBC # BLD AUTO: 8.2 X10*3/UL (ref 4.4–11.3)

## 2024-10-25 PROCEDURE — 80053 COMPREHEN METABOLIC PANEL: CPT

## 2024-10-25 PROCEDURE — 93010 ELECTROCARDIOGRAM REPORT: CPT | Performed by: INTERNAL MEDICINE

## 2024-10-25 PROCEDURE — 85025 COMPLETE CBC W/AUTO DIFF WBC: CPT

## 2024-10-25 PROCEDURE — 36415 COLL VENOUS BLD VENIPUNCTURE: CPT

## 2024-10-25 PROCEDURE — 87081 CULTURE SCREEN ONLY: CPT | Mod: ELYLAB

## 2024-10-25 PROCEDURE — 93005 ELECTROCARDIOGRAM TRACING: CPT

## 2024-10-25 PROCEDURE — 85610 PROTHROMBIN TIME: CPT

## 2024-10-25 PROCEDURE — 83036 HEMOGLOBIN GLYCOSYLATED A1C: CPT | Mod: ELYLAB

## 2024-10-25 NOTE — PREPROCEDURE INSTRUCTIONS
JOINT REPLACEMENT AND SPINAL SURGERY PRE-OPERATIVE INSTRUCTIONS     You will receive notification one business day prior to your surgery to confirm your arrival time and any additional information between 2 P.M. - 5 P.M. It is important that you answer your phone and/or check your messages during this time.     You may see in [email or web address] your surgery start time change several times even up to the day before your procedure. Please disregard those times and only follow the time given by the  who will be notifying you via phone and not a text.     Please arrive at your scheduled time to avoid delay or cancelled surgery.     Please enter the building through either the Main Entrance in front of the hospital or from the Parking Garage Walk Way Bridge. From the parking garage, which is free, take the 2nd Floor Walkway Bridge into the hospital and check in at the [facility] Outpatient Desk as you enter the hospital directly in front of you. If you enter through the Main Entrance take the elevator off the lobby on the right labeled “A” to the 2nd floor and check in at the [facility] Outpatient Surgery desk as you exit the elevator. Wheelchairs are available for use if using the Main Entrance.     Handicap parking in the land lot, in front of hospital by main entrance, wheelchairs are available at this entrance     ?   INSTRUCTIONS:   Please contact your doctor’s office, who is doing procedure, about any changes in your health, bad cold, fever, sore throat, or COVID within last 4 weeks     Talk to your surgeon for instructions if you should stop your aspirin, blood thinner, diabetes medicines, weight loss medications, multivitamins or over the counter supplements since many surgeons have you adjust or stop these medications prior to procedure. The doctor’s office may have you contact the prescribing doctor for medication adjustments for your surgery.     If you take certain medications like Beta Blocker or Anti-Seizure medication, you  may have to take them the morning of procedure with a sip of water. If this is the case your surgeons office should let you know, and the PAT nurses will follow up when they speak to you to verify you are aware.     If not staying overnight after your surgery, and you are receiving any type of anesthesia with your surgery, you must have an adult (age 18 or older) immediately available to drive you home after surgery or your procedure will be cancelled. You may be discharged home after surgery per an Uber, Lyft, Taxi or any other transportation service as long as the responsible adult (18 or older) is in the vehicle with you at time of discharge. The  of these transportation services is not responsible for your care and cannot be consider a responsible adult. We also highly recommend you have someone stay with you for the entire day and night of your surgery.     All jewelry and piercings must be removed. If you are unable to remove an item or have a dermal piercing, please be sure to tell the nurse when you arrive for surgery.     Nail polish must be removed off one finger of each hand     Make-up or other beauty products (lotion, deodorant, hairspray, perfume, etc.) must be removed or not used for day of surgery.     Avoid smoking, consuming alcohol, or any medical or recreational drug use for 24 hours before surgery.     Do not wear contacts to hospital, bring your glasses and a case     Leave valuables at home except photo ID, insurance card and any co-payment that has been requested by hospital.     For adults who are unable to consent or make medical decisions for themselves, a legal guardian or Power of  must accompany them to the surgery center. If this is not possible, please call [phone] to make additional arrangements.  If there is any guardianship or legal Power of  paperwork, please bring them the day of surgery.     Wear comfortable, loose fitting clothing into the procedure.   While your admitted you are asked to bring short sleeve shirts, shorts (loose like pajamas, sweat shorts), and tennis shoes/sneakers.  No slip on shoes.     Please bring your 2-Wheeled Walker with you the day of surgery and the [facility] Booklet that was given to you during your PAT appointment.     The nurse practitioners, , , physical therapist, and occupational therapist will all discuss and work with you during your stay to help with discharge, physical therapy and any other needs. They also may discuss a program called Meds to Beds, where postoperative medications prescribed to you after surgery will be filled and delivered to your beside before you leave, so that you do not need to stop at the pharmacy on the way home    If you use a CPAP or BIPAP, please make sure the PAT nurse was able to get the settings from you, if not please inform the nurses the day of surgery of your settings you use at home.     Additional instructions about eating and drinking before surgery:     Do not eat any solid foods?or drink anything after midnight the night prior to your procedure. Milk, nutritional drinks/supplements, and infant formula are considered solid foods.  This also includes no gum, candy, lozenges, ice, or any other oral consumption.     CHG SOAP    In regards to bathing, please follow the instructions explained to you at the PAT appointment about taking a showering with the CHG soap the 5 nights prior to your surgery.       During your PAT Appointment you were given [employer] CHG Wash Soap (bottles of bubble gum pink soap) to use before procedure.  Begin using the CHG soap 5 days before your scheduled surgery.  Be sure to sleep on clean sheets - change your sheets the first night you do this cleansing process (you don't not need to change them every night).     5 days prior to surgery at night- 4 days prior to surgery  at night- 3 days prior to surgery at night- the Night  before Surgery   You will take a shower in the evening before bed.  You will wash and rinse your face, genitals, and hair with normal soap and normal shampoo.     Then apply CHG soap solution to wet washcloth.     Turn the water off or move away from the water spray to avoid premature rinsing of the CHG soap as you apply it.   Apply the CHG soap to entire body from the neck down EXCEPT your face and genitals.  Allow the CHG soap to sit for 3 minutes on your skin.  Then turn on water and rinse the CHG solution off your body completely.    DO NOT wash with regular soap after you have used the CHG soap   Pat yourself dry with a clean, fresh-laundered towel when you get out of the shower and clean Pj's  DO NOT apply any powders, deodorants, or lotions after shower   Be aware the CHG soap will stain fabrics if you wash them with bleach after use.   Make sure to pay special attention to cleaning area(s) where your incision(s) will be located.   You will repeat this same process steps 1-12: 5 days prior at night- 4 days prior at night- 3 days prior at night- the Night before Surgery     Morning of Surgery CHG Soap- (Process changes slightly)  1. You will take a shower in the evening before bed.  2. You will wash and rinse your face and genitals with normal soap.  3. Then apply CHG soap solution to wet washcloth.     4. Turn the water off or move away from the water spray to avoid premature rinsing of the CHG soap as you apply it.   5. Apply the CHG soap to entire body from the neck down EXCEPT your face and genitals.  6. Wash your hair with CHG soap.  7.Allow the CHG soap to sit for 3 minutes on your skin and in your hair.  Then turn on water and rinse the CHG solution off your body completely.    DO NOT wash with regular soap after you have used the CHG soap   Pat yourself dry with a clean, fresh-laundered towel when you get out of the shower and clean Pj's  DO NOT apply any powders, deodorants, hair products or lotions  after shower   Be aware the CHG soap will stain fabrics if you wash them with bleach after use.   Make sure to pay special attention to cleaning area(s) where your incision(s) will be located.   Put clean clothes on to come in for procedure.    ORAL RINSE   You will receive a call or notification from your pharmacy to  a prescription prior to procedure.  Be sure to  the prescription oral rinse from your local pharmacy.   You will be using the oral rinse the night before and the morning of surgery.    Take a cap full of the solution, 15mL     Swish (gargle if you can) the mouthwash in your mouth for at least 30 seconds, (DO NOT SWALLOW), and spit out     After the oral CHG rinse, do not rinse your mouth with water, drink, or eat.      If you have to take a medication the morning of surgery as instructed by your surgeon- please take that medication with a sip of water prior to doing the oral rinse the day of surgery.       ?If you have any questions or concerns, please call Pre-Admission Testing at [phone] or your Physician’s office Dr. Tyrese Ellis  [phone]  [facility] General Line: [phone]

## 2024-10-27 LAB
ATRIAL RATE: 52 BPM
P AXIS: 59 DEGREES
P OFFSET: 196 MS
P ONSET: 144 MS
PR INTERVAL: 136 MS
Q ONSET: 212 MS
QRS COUNT: 9 BEATS
QRS DURATION: 90 MS
QT INTERVAL: 468 MS
QTC CALCULATION(BAZETT): 435 MS
QTC FREDERICIA: 446 MS
R AXIS: -31 DEGREES
STAPHYLOCOCCUS SPEC CULT: ABNORMAL
T AXIS: 47 DEGREES
T OFFSET: 446 MS
VENTRICULAR RATE: 52 BPM

## 2024-10-28 DIAGNOSIS — Z01.818 PRE-OPERATIVE CLEARANCE: Primary | ICD-10-CM

## 2024-10-28 RX ORDER — CHLORHEXIDINE GLUCONATE ORAL RINSE 1.2 MG/ML
15 SOLUTION DENTAL DAILY
Qty: 30 ML | Refills: 0 | Status: SHIPPED | OUTPATIENT
Start: 2024-10-28 | End: 2024-10-30

## 2024-10-31 ENCOUNTER — OFFICE VISIT (OUTPATIENT)
Dept: ORTHOPEDIC SURGERY | Facility: CLINIC | Age: 64
End: 2024-10-31
Payer: COMMERCIAL

## 2024-10-31 DIAGNOSIS — Z96.651 STATUS POST TOTAL RIGHT KNEE REPLACEMENT: Primary | ICD-10-CM

## 2024-10-31 PROCEDURE — 99211 OFF/OP EST MAY X REQ PHY/QHP: CPT | Performed by: PHYSICIAN ASSISTANT

## 2024-11-04 ENCOUNTER — APPOINTMENT (OUTPATIENT)
Dept: RADIOLOGY | Facility: CLINIC | Age: 64
End: 2024-11-04
Payer: COMMERCIAL

## 2024-11-04 ENCOUNTER — APPOINTMENT (OUTPATIENT)
Dept: CARDIOLOGY | Facility: CLINIC | Age: 64
End: 2024-11-04
Payer: COMMERCIAL

## 2024-11-18 ENCOUNTER — APPOINTMENT (OUTPATIENT)
Dept: CARDIOLOGY | Facility: CLINIC | Age: 64
End: 2024-11-18
Payer: COMMERCIAL

## 2024-11-18 DIAGNOSIS — I21.3 ST ELEVATION MYOCARDIAL INFARCTION (STEMI), UNSPECIFIED ARTERY (MULTI): ICD-10-CM

## 2024-11-18 RX ORDER — TICAGRELOR 90 MG/1
90 TABLET ORAL 2 TIMES DAILY
Qty: 180 TABLET | Refills: 3 | Status: SHIPPED | OUTPATIENT
Start: 2024-11-18

## 2024-11-18 NOTE — TELEPHONE ENCOUNTER
Received request for prescription refills for patient.   Patient follows with Dr. Nish Parnell    Request is for Brilinta  Is patient currently on medication yes    Last OV 10/14/2024  Next OV 4/21/2025    Pended for signing and sent to provider

## 2024-11-20 ENCOUNTER — APPOINTMENT (OUTPATIENT)
Dept: ORTHOPEDIC SURGERY | Facility: CLINIC | Age: 64
End: 2024-11-20
Payer: COMMERCIAL

## 2024-11-24 DIAGNOSIS — F41.1 GAD (GENERALIZED ANXIETY DISORDER): ICD-10-CM

## 2024-11-24 RX ORDER — ESCITALOPRAM OXALATE 10 MG/1
10 TABLET ORAL DAILY
Qty: 90 TABLET | Refills: 1 | Status: SHIPPED | OUTPATIENT
Start: 2024-11-24

## 2024-12-06 ENCOUNTER — APPOINTMENT (OUTPATIENT)
Dept: PRIMARY CARE | Facility: CLINIC | Age: 64
End: 2024-12-06
Payer: COMMERCIAL

## 2024-12-06 DIAGNOSIS — E78.5 HYPERLIPIDEMIA, UNSPECIFIED: ICD-10-CM

## 2024-12-06 RX ORDER — OMEGA-3-ACID ETHYL ESTERS 1 G/1
CAPSULE, LIQUID FILLED ORAL
Qty: 180 CAPSULE | Refills: 1 | Status: SHIPPED | OUTPATIENT
Start: 2024-12-06

## 2025-01-06 ENCOUNTER — APPOINTMENT (OUTPATIENT)
Dept: RADIOLOGY | Facility: HOSPITAL | Age: 65
End: 2025-01-06
Payer: COMMERCIAL

## 2025-01-06 ENCOUNTER — APPOINTMENT (OUTPATIENT)
Dept: CARDIOLOGY | Facility: CLINIC | Age: 65
End: 2025-01-06
Payer: COMMERCIAL

## 2025-01-13 ENCOUNTER — HOSPITAL ENCOUNTER (OUTPATIENT)
Dept: RADIOLOGY | Facility: HOSPITAL | Age: 65
Discharge: HOME | End: 2025-01-13
Payer: MEDICARE

## 2025-01-13 DIAGNOSIS — I65.23 BILATERAL CAROTID ARTERY STENOSIS: ICD-10-CM

## 2025-01-13 DIAGNOSIS — E78.2 MIXED HYPERLIPIDEMIA: ICD-10-CM

## 2025-01-13 PROCEDURE — 93880 EXTRACRANIAL BILAT STUDY: CPT | Performed by: RADIOLOGY

## 2025-01-13 PROCEDURE — 93880 EXTRACRANIAL BILAT STUDY: CPT

## 2025-01-24 DIAGNOSIS — I10 PRIMARY HYPERTENSION: ICD-10-CM

## 2025-01-24 DIAGNOSIS — I25.10 CAD S/P PERCUTANEOUS CORONARY ANGIOPLASTY: ICD-10-CM

## 2025-01-24 DIAGNOSIS — Z98.61 CAD S/P PERCUTANEOUS CORONARY ANGIOPLASTY: ICD-10-CM

## 2025-01-24 DIAGNOSIS — I21.3 ST ELEVATION MYOCARDIAL INFARCTION (STEMI), UNSPECIFIED ARTERY (MULTI): ICD-10-CM

## 2025-01-24 DIAGNOSIS — E78.2 MODERATE MIXED HYPERLIPIDEMIA NOT REQUIRING STATIN THERAPY: ICD-10-CM

## 2025-01-24 RX ORDER — ATORVASTATIN CALCIUM 80 MG/1
80 TABLET, FILM COATED ORAL DAILY
Qty: 90 TABLET | Refills: 3 | Status: SHIPPED | OUTPATIENT
Start: 2025-01-24

## 2025-01-24 RX ORDER — CARVEDILOL 25 MG/1
25 TABLET ORAL 2 TIMES DAILY
Qty: 180 TABLET | Refills: 3 | Status: SHIPPED | OUTPATIENT
Start: 2025-01-24

## 2025-01-24 RX ORDER — LOSARTAN POTASSIUM 100 MG/1
100 TABLET ORAL DAILY
Qty: 90 TABLET | Refills: 3 | Status: SHIPPED | OUTPATIENT
Start: 2025-01-24

## 2025-01-24 NOTE — TELEPHONE ENCOUNTER
Received request for prescription refills for patient.   Patient follows with Dr. Vaughn Parnell      Request is for Losartan, Lipitor and Coreg  Is patient currently on medication yes    Last OV 10/14/24  Next OV 4/21/25    Pended for signing and sent to provider

## 2025-01-30 ENCOUNTER — TELEPHONE (OUTPATIENT)
Dept: CARDIOLOGY | Facility: CLINIC | Age: 65
End: 2025-01-30
Payer: MEDICARE

## 2025-01-30 NOTE — TELEPHONE ENCOUNTER
Called patient to review Vascular US Carotid Artery Duplex Bilateral 1/13/25. No significant change from prior testing 12/4/23. Patient is medically managed with Aspirin 81mg QD and Atorvastatin 80mg at bedtime. Follow up as scheduled with Dr. Nish Padron 4/2025. Will need Vascular US Carotid Artery Duplex Bilateral 1/2026.

## 2025-02-05 ENCOUNTER — APPOINTMENT (OUTPATIENT)
Dept: PRIMARY CARE | Facility: CLINIC | Age: 65
End: 2025-02-05
Payer: COMMERCIAL

## 2025-02-05 VITALS
BODY MASS INDEX: 36.29 KG/M2 | WEIGHT: 180 LBS | HEART RATE: 56 BPM | HEIGHT: 59 IN | TEMPERATURE: 96 F | SYSTOLIC BLOOD PRESSURE: 120 MMHG | DIASTOLIC BLOOD PRESSURE: 80 MMHG

## 2025-02-05 DIAGNOSIS — E66.812 CLASS 2 SEVERE OBESITY DUE TO EXCESS CALORIES WITH SERIOUS COMORBIDITY AND BODY MASS INDEX (BMI) OF 36.0 TO 36.9 IN ADULT: Primary | ICD-10-CM

## 2025-02-05 DIAGNOSIS — Z12.31 ENCOUNTER FOR SCREENING MAMMOGRAM FOR BREAST CANCER: ICD-10-CM

## 2025-02-05 DIAGNOSIS — I10 PRIMARY HYPERTENSION: ICD-10-CM

## 2025-02-05 DIAGNOSIS — E78.2 MIXED HYPERLIPIDEMIA: ICD-10-CM

## 2025-02-05 DIAGNOSIS — Z00.00 ROUTINE GENERAL MEDICAL EXAMINATION AT HEALTH CARE FACILITY: ICD-10-CM

## 2025-02-05 DIAGNOSIS — E66.01 CLASS 2 SEVERE OBESITY DUE TO EXCESS CALORIES WITH SERIOUS COMORBIDITY AND BODY MASS INDEX (BMI) OF 36.0 TO 36.9 IN ADULT: Primary | ICD-10-CM

## 2025-02-05 DIAGNOSIS — K76.0 NAFLD (NONALCOHOLIC FATTY LIVER DISEASE): ICD-10-CM

## 2025-02-05 DIAGNOSIS — Z87.891 PERSONAL HISTORY OF TOBACCO USE, PRESENTING HAZARDS TO HEALTH: ICD-10-CM

## 2025-02-05 DIAGNOSIS — Z22.322 MRSA COLONIZATION: ICD-10-CM

## 2025-02-05 PROCEDURE — 3079F DIAST BP 80-89 MM HG: CPT | Performed by: INTERNAL MEDICINE

## 2025-02-05 PROCEDURE — 1159F MED LIST DOCD IN RCRD: CPT | Performed by: INTERNAL MEDICINE

## 2025-02-05 PROCEDURE — G0438 PPPS, INITIAL VISIT: HCPCS | Performed by: INTERNAL MEDICINE

## 2025-02-05 PROCEDURE — 1170F FXNL STATUS ASSESSED: CPT | Performed by: INTERNAL MEDICINE

## 2025-02-05 PROCEDURE — 1123F ACP DISCUSS/DSCN MKR DOCD: CPT | Performed by: INTERNAL MEDICINE

## 2025-02-05 PROCEDURE — 1036F TOBACCO NON-USER: CPT | Performed by: INTERNAL MEDICINE

## 2025-02-05 PROCEDURE — 3074F SYST BP LT 130 MM HG: CPT | Performed by: INTERNAL MEDICINE

## 2025-02-05 PROCEDURE — 1160F RVW MEDS BY RX/DR IN RCRD: CPT | Performed by: INTERNAL MEDICINE

## 2025-02-05 PROCEDURE — 3008F BODY MASS INDEX DOCD: CPT | Performed by: INTERNAL MEDICINE

## 2025-02-05 RX ORDER — MUPIROCIN 20 MG/G
OINTMENT TOPICAL
Qty: 15 G | Refills: 0 | Status: SHIPPED | OUTPATIENT
Start: 2025-02-05

## 2025-02-05 RX ORDER — AMLODIPINE BESYLATE 5 MG/1
TABLET ORAL
Qty: 30 TABLET | Refills: 5 | Status: SHIPPED | OUTPATIENT
Start: 2025-02-05

## 2025-02-05 ASSESSMENT — ACTIVITIES OF DAILY LIVING (ADL)
USING TELEPHONE: INDEPENDENT
PATIENT'S MEMORY ADEQUATE TO SAFELY COMPLETE DAILY ACTIVITIES?: YES
JUDGMENT_ADEQUATE_SAFELY_COMPLETE_DAILY_ACTIVITIES: YES
GROOMING: INDEPENDENT
EATING: INDEPENDENT
ADEQUATE_TO_COMPLETE_ADL: YES
PILL BOX USED: NO
PREPARING MEALS: INDEPENDENT
HEARING - LEFT EAR: FUNCTIONAL
STIL DRIVING: YES
WALKS IN HOME: INDEPENDENT
DOING HOUSEWORK: INDEPENDENT
GROCERY SHOPPING: INDEPENDENT
FEEDING YOURSELF: INDEPENDENT
USING TRANSPORTATION: INDEPENDENT
DRESSING YOURSELF: INDEPENDENT
MANAGING FINANCES: INDEPENDENT
HEARING - RIGHT EAR: FUNCTIONAL
BATHING: INDEPENDENT
TAKING MEDICATION: INDEPENDENT
TOILETING: INDEPENDENT

## 2025-02-05 ASSESSMENT — PATIENT HEALTH QUESTIONNAIRE - PHQ9
1. LITTLE INTEREST OR PLEASURE IN DOING THINGS: NOT AT ALL
SUM OF ALL RESPONSES TO PHQ9 QUESTIONS 1 AND 2: 0
1. LITTLE INTEREST OR PLEASURE IN DOING THINGS: NOT AT ALL
SUM OF ALL RESPONSES TO PHQ9 QUESTIONS 1 AND 2: 0
2. FEELING DOWN, DEPRESSED OR HOPELESS: NOT AT ALL
2. FEELING DOWN, DEPRESSED OR HOPELESS: NOT AT ALL

## 2025-02-05 ASSESSMENT — COLUMBIA-SUICIDE SEVERITY RATING SCALE - C-SSRS
2. HAVE YOU ACTUALLY HAD ANY THOUGHTS OF KILLING YOURSELF?: NO
1. IN THE PAST MONTH, HAVE YOU WISHED YOU WERE DEAD OR WISHED YOU COULD GO TO SLEEP AND NOT WAKE UP?: NO

## 2025-02-05 ASSESSMENT — ENCOUNTER SYMPTOMS
GASTROINTESTINAL NEGATIVE: 1
OCCASIONAL FEELINGS OF UNSTEADINESS: 0
HEMATOLOGIC/LYMPHATIC NEGATIVE: 1
ARTHRALGIAS: 1
NEUROLOGICAL NEGATIVE: 1
CONSTITUTIONAL NEGATIVE: 1
CARDIOVASCULAR NEGATIVE: 1
FATIGUE: 0
COUGH: 1
DEPRESSION: 0
LOSS OF SENSATION IN FEET: 0

## 2025-02-05 NOTE — PROGRESS NOTES
Subjective   Reason for Visit: Audrey Estrada is an 64 y.o. female here for a Medicare Wellness visit.     Past Medical, Surgical, and Family History reviewed and updated in chart.    Reviewed all medications by prescribing practitioner or clinical pharmacist (such as prescriptions, OTCs, herbal therapies and supplements) and documented in the medical record.    64-year-old female patient was seen for wellness exam for this year.  Since she has a myocardial infarction she is very much frightened and her anxiety is not going away, at least her chest pains has been subsided since she has been started on Imdur, she did not have any ER visit, recently she has a laboratories done hemoglobin was consistently more than 11, iron can be stopped.  She is on 10 mg of Lexapro.  Recently she was in [place], she enjoyed her stay but still very much anxious and worried about something can go wrong with her.  Patient is a former smoker, she decided not to go for knee replacement, she has a MRSA isolated when they did a swab of the nasal cavity, she is asking me what the meaning of it and I told that it is simply MRSA colonization and they do that before any surgery.  She was having respiratory ailment but she is recovering.  Her electrolytes has been normal, she stays on Brilinta, she has a nonalcoholic fatty liver disease which needs to be addressed, she needs a mammogram and low-dose CT.  Repeatedly patient has been counseled by me in the past anyone today also.        Patient Care Team:  Cam Araya MD as PCP - General  Nish Parnell MD as Cardiologist (Vascular Surgery)     Review of Systems   Constitutional: Negative.  Negative for fatigue.   HENT:  Positive for congestion.    Respiratory:  Positive for cough.    Cardiovascular: Negative.    Gastrointestinal: Negative.    Musculoskeletal:  Positive for arthralgias.   Neurological: Negative.    Hematological: Negative.        Objective   Vitals:  /80 (BP  "Location: Left arm, Patient Position: Sitting, BP Cuff Size: Adult)   Pulse 56   Temp 35.6 °C (96 °F) (Temporal)   Ht 1.499 m (4' 11\")   Wt 81.6 kg (180 lb)   BMI 36.36 kg/m²       Physical Exam  Constitutional:       Appearance: Normal appearance. She is obese.   HENT:      Head: Normocephalic.      Mouth/Throat:      Comments: Dry lips  Eyes:      Conjunctiva/sclera: Conjunctivae normal.   Cardiovascular:      Rate and Rhythm: Normal rate and regular rhythm.      Pulses: Normal pulses.           Carotid pulses are  on the left side with bruit.     Heart sounds: Normal heart sounds.   Pulmonary:      Effort: Pulmonary effort is normal.      Breath sounds: Normal breath sounds.   Abdominal:      General: Abdomen is flat.      Palpations: Abdomen is soft.   Musculoskeletal:         General: No tenderness or deformity.      Cervical back: Neck supple.   Skin:     General: Skin is warm and dry.   Neurological:      General: No focal deficit present.      Mental Status: She is oriented to person, place, and time. Mental status is at baseline.   Psychiatric:         Mood and Affect: Mood normal.       Assessment & Plan  Primary hypertension    Orders:    amLODIPine (Norvasc) 5 mg tablet; Take one tablet as needed once a day if SBP more then 160 and DBP more then 90 mm Hg    CBC and Auto Differential; Future    Comprehensive Metabolic Panel; Future    Class 2 severe obesity due to excess calories with serious comorbidity and body mass index (BMI) of 36.0 to 36.9 in adult         Routine general medical examination at health care facility    Orders:    1 Year Follow Up In Primary Care - Wellness Exam; Future    Personal history of tobacco use, presenting hazards to health    Orders:    CT lung screening low dose; Future    Encounter for screening mammogram for breast cancer    Orders:    BI mammo bilateral screening tomosynthesis; Future  I discussed smoking history/status that determined patient meets criteria for " lung cancer screening with low dose CT scan.By using shared decision making we determined that patient will benefit from screening, including discussion of benefits and harms of screening, follow up testing, overdiagnosis, false positive rate and total radiation exposure.I counseled the patient on the importance of adhering to annual lung cancer low dose screening, the impact of comorbidities and individual ability or willingness to undergo diagnosis and treatment if abnormalities found. I provided patient an order for low dose CT Lung screening. Pt was encouraged to stay adherent with no smoking status and refrain from any possibility of staring it again.  Please proceed with mammogram and low-dose CT scan of the lung and also FibroScan.  Mupirocin was given for MRSA colonization.  RSV vaccine and shingles vaccine can be obtained.  She will return in 3 months with laboratories to be performed.  Lexapro will be continued, counseled about the situation told that she has been treated very well with the stenting of the artery which was significantly occluded which was the culprit for myocardial infarction and with therapeutics and lifestyle things will be all right.  Appropriate screening test will be done, she needs to enjoy her life, she needs to live a normal life, although we have to be careful with the lifestyle modification which she has done it.  She is definitely staying away from smoking, healthy lifestyle and healthy choices of food has been acquired by her.  Complete physical exam was done, will check electrolytes, potassium, magnesium next time, will check LDL, follow-up in 3 months.

## 2025-02-05 NOTE — ASSESSMENT & PLAN NOTE
Orders:    amLODIPine (Norvasc) 5 mg tablet; Take one tablet as needed once a day if SBP more then 160 and DBP more then 90 mm Hg    CBC and Auto Differential; Future    Comprehensive Metabolic Panel; Future

## 2025-04-01 ENCOUNTER — HOSPITAL ENCOUNTER (OUTPATIENT)
Dept: RADIOLOGY | Facility: HOSPITAL | Age: 65
Discharge: HOME | End: 2025-04-01
Payer: MEDICARE

## 2025-04-01 DIAGNOSIS — Z87.891 PERSONAL HISTORY OF TOBACCO USE, PRESENTING HAZARDS TO HEALTH: ICD-10-CM

## 2025-04-01 PROCEDURE — 71271 CT THORAX LUNG CANCER SCR C-: CPT | Performed by: RADIOLOGY

## 2025-04-01 PROCEDURE — 71271 CT THORAX LUNG CANCER SCR C-: CPT

## 2025-04-21 ENCOUNTER — APPOINTMENT (OUTPATIENT)
Dept: CARDIOLOGY | Facility: CLINIC | Age: 65
End: 2025-04-21
Payer: COMMERCIAL

## 2025-04-21 ENCOUNTER — TELEPHONE (OUTPATIENT)
Dept: CARDIOLOGY | Facility: CLINIC | Age: 65
End: 2025-04-21

## 2025-04-21 NOTE — TELEPHONE ENCOUNTER
Patient called and stated she was supposed to have an appointment with Dr. Nish Mendes MD, FAC, [facility], [facility] today but it was rescheduled. She wanted to discuss the results from a recent CT of the lungs from Dr. Araya. She stated it has some heart results on there that concern her. She denies any cardiac symptoms at this time. Patient is rescheduled for 6/2/25. Routed to Marc PAZ RN

## 2025-04-22 NOTE — TELEPHONE ENCOUNTER
Reviewed chart. Dr. Araya ordered CT Lung screening for patient.     This demonstrated severe Coronary Calcifications and Mod-Severe Mitral Calcifications.     Reviewed CT Calcium scoring from 2021 which demonstrated 806+ score.   Reviewed Echo from 2023 which demonstrated moderate mitral calcifications    Patient has known Coronary Artery Disease, most recent angiography in 10/2023 completed with IVUS guided CARLOS ALBERTO to mid and distal RPL branch.   Patient is well compensated on ASA, Brilinta and high intensity statin.     In terms of progression on echo regarding Mitral Calcifications- will defer to Dr. Nish Mendes MD [facility] for comments.

## 2025-05-07 ENCOUNTER — APPOINTMENT (OUTPATIENT)
Dept: PRIMARY CARE | Facility: CLINIC | Age: 65
End: 2025-05-07
Payer: MEDICARE

## 2025-05-12 ENCOUNTER — CLINICAL SUPPORT (OUTPATIENT)
Dept: GASTROENTEROLOGY | Facility: CLINIC | Age: 65
End: 2025-05-12
Payer: MEDICARE

## 2025-05-12 DIAGNOSIS — K76.0 NAFLD (NONALCOHOLIC FATTY LIVER DISEASE): ICD-10-CM

## 2025-05-12 PROCEDURE — 91200 LIVER ELASTOGRAPHY: CPT | Performed by: INTERNAL MEDICINE

## 2025-05-12 NOTE — LETTER
May 13, 2025     Cam Araya MD  [address]  [address]  [facility] [address]    Patient: Audrey Estrada   YOB: 1960   Date of Visit: 5/12/2025       Dear Dr. Cam Araya MD:    Thank you for referring Audrey Estrada for evaluation with FibroScan. Below are my notes for this consultation.  If you have questions, please do not hesitate to call me.        Sincerely,     [facility] [id number] FIBROSCAN      CC: No Recipients  ______________________________________________________________________________________      Results:  E (median liver stiffness measurement):  8.6    kPa  CAP (controlled attenuation parameter):  273   dB/m  IQR/med: 12%    Interpretation:  This was a technically adequate study. The Fibrosis score is consistent with Metavir F2. The CAP score is consistent with 0 - 10% hepatocyte steatosis (steatosis grade 0 - 1  of 3 ) .    Zaire Montelongo MD  Hepatology

## 2025-05-13 LAB
ALBUMIN SERPL-MCNC: 4.3 G/DL (ref 3.6–5.1)
ALP SERPL-CCNC: 50 U/L (ref 37–153)
ALT SERPL-CCNC: 16 U/L (ref 6–29)
ANION GAP SERPL CALCULATED.4IONS-SCNC: 12 MMOL/L (CALC) (ref 7–17)
AST SERPL-CCNC: 17 U/L (ref 10–35)
BASOPHILS # BLD AUTO: 71 CELLS/UL (ref 0–200)
BASOPHILS NFR BLD AUTO: 1 %
BILIRUB SERPL-MCNC: 0.5 MG/DL (ref 0.2–1.2)
BUN SERPL-MCNC: 26 MG/DL (ref 7–25)
CALCIUM SERPL-MCNC: 9.3 MG/DL (ref 8.6–10.4)
CHLORIDE SERPL-SCNC: 106 MMOL/L (ref 98–110)
CHOLEST SERPL-MCNC: 124 MG/DL
CHOLEST/HDLC SERPL: 3 (CALC)
CO2 SERPL-SCNC: 25 MMOL/L (ref 20–32)
CREAT SERPL-MCNC: 1.18 MG/DL (ref 0.5–1.05)
EGFRCR SERPLBLD CKD-EPI 2021: 51 ML/MIN/1.73M2
EOSINOPHIL # BLD AUTO: 298 CELLS/UL (ref 15–500)
EOSINOPHIL NFR BLD AUTO: 4.2 %
ERYTHROCYTE [DISTWIDTH] IN BLOOD BY AUTOMATED COUNT: 16.2 % (ref 11–15)
GLUCOSE SERPL-MCNC: 118 MG/DL (ref 65–99)
HCT VFR BLD AUTO: 37.4 % (ref 35–45)
HDLC SERPL-MCNC: 42 MG/DL
HGB BLD-MCNC: 11 G/DL (ref 11.7–15.5)
LDLC SERPL CALC-MCNC: 53 MG/DL (CALC)
LYMPHOCYTES # BLD AUTO: 1101 CELLS/UL (ref 850–3900)
LYMPHOCYTES NFR BLD AUTO: 15.5 %
MCH RBC QN AUTO: 25.5 PG (ref 27–33)
MCHC RBC AUTO-ENTMCNC: 29.4 G/DL (ref 32–36)
MCV RBC AUTO: 86.8 FL (ref 80–100)
MONOCYTES # BLD AUTO: 454 CELLS/UL (ref 200–950)
MONOCYTES NFR BLD AUTO: 6.4 %
NEUTROPHILS # BLD AUTO: 5176 CELLS/UL (ref 1500–7800)
NEUTROPHILS NFR BLD AUTO: 72.9 %
NONHDLC SERPL-MCNC: 82 MG/DL (CALC)
PLATELET # BLD AUTO: 224 THOUSAND/UL (ref 140–400)
PMV BLD REES-ECKER: 9.8 FL (ref 7.5–12.5)
POTASSIUM SERPL-SCNC: 4.5 MMOL/L (ref 3.5–5.3)
PROT SERPL-MCNC: 7.6 G/DL (ref 6.1–8.1)
RBC # BLD AUTO: 4.31 MILLION/UL (ref 3.8–5.1)
SODIUM SERPL-SCNC: 143 MMOL/L (ref 135–146)
TRIGL SERPL-MCNC: 230 MG/DL
WBC # BLD AUTO: 7.1 THOUSAND/UL (ref 3.8–10.8)

## 2025-05-13 NOTE — PROGRESS NOTES
Results:  E (median liver stiffness measurement):  8.6    kPa  CAP (controlled attenuation parameter):  273   dB/m  IQR/med: 12%    Interpretation:  This was a technically adequate study. The Fibrosis score is consistent with Metavir F2. The CAP score is consistent with 0 - 10% hepatocyte steatosis (steatosis grade 0 - 1  of 3 ) .    Zaire Montelongo MD  Hepatology

## 2025-05-21 ENCOUNTER — APPOINTMENT (OUTPATIENT)
Dept: PRIMARY CARE | Facility: CLINIC | Age: 65
End: 2025-05-21
Payer: MEDICARE

## 2025-05-21 VITALS
BODY MASS INDEX: 36.89 KG/M2 | TEMPERATURE: 96.2 F | SYSTOLIC BLOOD PRESSURE: 120 MMHG | HEART RATE: 67 BPM | HEIGHT: 59 IN | DIASTOLIC BLOOD PRESSURE: 78 MMHG | WEIGHT: 183 LBS

## 2025-05-21 DIAGNOSIS — E66.01 CLASS 2 SEVERE OBESITY DUE TO EXCESS CALORIES WITH SERIOUS COMORBIDITY AND BODY MASS INDEX (BMI) OF 36.0 TO 36.9 IN ADULT: ICD-10-CM

## 2025-05-21 DIAGNOSIS — F41.1 GAD (GENERALIZED ANXIETY DISORDER): ICD-10-CM

## 2025-05-21 DIAGNOSIS — I10 PRIMARY HYPERTENSION: Primary | ICD-10-CM

## 2025-05-21 DIAGNOSIS — K76.0 METABOLIC DYSFUNCTION-ASSOCIATED STEATOTIC LIVER DISEASE (MASLD): ICD-10-CM

## 2025-05-21 DIAGNOSIS — I25.110 CORONARY ARTERY DISEASE INVOLVING NATIVE CORONARY ARTERY OF NATIVE HEART WITH UNSTABLE ANGINA PECTORIS: ICD-10-CM

## 2025-05-21 DIAGNOSIS — E55.9 VITAMIN D DEFICIENCY: ICD-10-CM

## 2025-05-21 DIAGNOSIS — E66.812 CLASS 2 SEVERE OBESITY DUE TO EXCESS CALORIES WITH SERIOUS COMORBIDITY AND BODY MASS INDEX (BMI) OF 36.0 TO 36.9 IN ADULT: ICD-10-CM

## 2025-05-21 PROCEDURE — 1036F TOBACCO NON-USER: CPT | Performed by: INTERNAL MEDICINE

## 2025-05-21 PROCEDURE — 3008F BODY MASS INDEX DOCD: CPT | Performed by: INTERNAL MEDICINE

## 2025-05-21 PROCEDURE — 99213 OFFICE O/P EST LOW 20 MIN: CPT | Performed by: INTERNAL MEDICINE

## 2025-05-21 PROCEDURE — 3078F DIAST BP <80 MM HG: CPT | Performed by: INTERNAL MEDICINE

## 2025-05-21 PROCEDURE — 1159F MED LIST DOCD IN RCRD: CPT | Performed by: INTERNAL MEDICINE

## 2025-05-21 PROCEDURE — 3074F SYST BP LT 130 MM HG: CPT | Performed by: INTERNAL MEDICINE

## 2025-05-21 PROCEDURE — G2211 COMPLEX E/M VISIT ADD ON: HCPCS | Performed by: INTERNAL MEDICINE

## 2025-05-21 RX ORDER — ESCITALOPRAM OXALATE 10 MG/1
10 TABLET ORAL DAILY
Qty: 90 TABLET | Refills: 1 | Status: SHIPPED | OUTPATIENT
Start: 2025-05-21

## 2025-05-21 ASSESSMENT — ENCOUNTER SYMPTOMS
GASTROINTESTINAL NEGATIVE: 1
DEPRESSION: 0
ARTHRALGIAS: 1
NEUROLOGICAL NEGATIVE: 1
OCCASIONAL FEELINGS OF UNSTEADINESS: 0
LOSS OF SENSATION IN FEET: 0
NERVOUS/ANXIOUS: 1
RESPIRATORY NEGATIVE: 1
CARDIOVASCULAR NEGATIVE: 1
CONSTITUTIONAL NEGATIVE: 1

## 2025-05-21 ASSESSMENT — COLUMBIA-SUICIDE SEVERITY RATING SCALE - C-SSRS
1. IN THE PAST MONTH, HAVE YOU WISHED YOU WERE DEAD OR WISHED YOU COULD GO TO SLEEP AND NOT WAKE UP?: NO
6. HAVE YOU EVER DONE ANYTHING, STARTED TO DO ANYTHING, OR PREPARED TO DO ANYTHING TO END YOUR LIFE?: NO
2. HAVE YOU ACTUALLY HAD ANY THOUGHTS OF KILLING YOURSELF?: NO

## 2025-05-21 ASSESSMENT — PATIENT HEALTH QUESTIONNAIRE - PHQ9
2. FEELING DOWN, DEPRESSED OR HOPELESS: NOT AT ALL
1. LITTLE INTEREST OR PLEASURE IN DOING THINGS: NOT AT ALL
SUM OF ALL RESPONSES TO PHQ9 QUESTIONS 1 AND 2: 0

## 2025-05-21 NOTE — PROGRESS NOTES
Subjective   Patient ID: Audrey Estrada is a 64 y.o. female who presents for Follow-up (3 mo fu).  HPI  Heart Problem  This is a new problem. The current episode started more than 1 month ago. The problem occurs rarely. The problem has been resolved. Pertinent negatives include no abdominal pain, anorexia, arthralgias, congestion, diaphoresis, headaches, nausea, neck pain, numbness or weakness. The treatment provided moderate relief. Has less episodes of chest pains.  Hypertension  This is a chronic problem. The current episode started more than 1 year ago. The problem has been waxing and waning since onset. The problem is controlled. Pertinent negatives include no blurred vision, headaches or neck pain. Past treatments include beta blockers and angiotensin blockers. The current treatment provides significant improvement. There are no compliance problems.  Hypertensive end-organ damage includes CAD/MI  Liver Dysfunction: Patient complains of an abnormal AST, ALT, and albumin that has been associated with no obvious evidence for illness. The problem was first noted  several years ago. A positive history was noted for: MASLD, MASLD, and exposure to none. Patient denies none, obesity, or exposure to none.    Past Medical History  Medical History[1]    Social History  Social History[2]    Family History   Family History[3]    Allergies:  RX Allergies[4]     Outpatient Medications:  Current Outpatient Medications   Medication Instructions    amLODIPine (Norvasc) 5 mg tablet Take one tablet as needed once a day if SBP more then 160 and DBP more then 90 mm Hg    aspirin 81 mg, oral, Daily    atorvastatin (LIPITOR) 80 mg, oral, Daily    Brilinta 90 mg, oral, 2 times daily    carvedilol (COREG) 25 mg, oral, 2 times daily    cholecalciferol (VITAMIN D-3) 25 mcg, Daily    escitalopram (LEXAPRO) 10 mg, oral, Daily    isosorbide mononitrate ER (IMDUR) 30 mg, oral, Daily, Do not crush or chew.    losartan (COZAAR) 100 mg, oral,  "Daily    mupirocin (Bactroban) 2 % ointment Apply at nares twice daily for 3 days    omega-3 acid ethyl esters (Lovaza) 1 gram capsule TAKE 1 CAPSULE BY MOUTH TWICE A DAY    omeprazole OTC (PRILOSEC OTC) 1 mg, Daily before breakfast    potassium chloride CR (Klor-Con) 10 mEq ER tablet 10 mEq, oral, Daily, Do not crush, chew, or split.        Review of Systems   Constitutional: Negative.    Respiratory: Negative.     Cardiovascular: Negative.    Gastrointestinal: Negative.    Musculoskeletal:  Positive for arthralgias.   Neurological: Negative.    Psychiatric/Behavioral:  The patient is nervous/anxious.          Objective       Physical Exam  Vitals reviewed.   Constitutional:       Appearance: Normal appearance. She is obese.   HENT:      Head: Normocephalic.   Eyes:      Conjunctiva/sclera: Conjunctivae normal.   Cardiovascular:      Rate and Rhythm: Normal rate and regular rhythm.      Pulses: Normal pulses.   Pulmonary:      Effort: Pulmonary effort is normal.      Breath sounds: Normal breath sounds.   Abdominal:      General: Abdomen is protuberant.      Palpations: Abdomen is soft.   Musculoskeletal:         General: Normal range of motion.      Cervical back: Neck supple.   Skin:     General: Skin is warm and dry.   Neurological:      General: No focal deficit present.   Psychiatric:         Mood and Affect: Mood is not anxious.       /78 (BP Location: Left arm, Patient Position: Sitting, BP Cuff Size: Adult)   Pulse 67   Temp 35.7 °C (96.2 °F) (Temporal)   Ht 1.499 m (4' 11\")   Wt 83 kg (183 lb)   BMI 36.96 kg/m²      Assessment/Plan   Problem List Items Addressed This Visit       HTN (hypertension) - Primary    Metabolic dysfunction-associated steatotic liver disease (MASLD)    Coronary artery disease involving native coronary artery of native heart with unstable angina pectoris    Class 2 severe obesity due to excess calories with serious comorbidity and body mass index (BMI) of 36.0 to 36.9 in " adult   Still a lot of stress and still she is not coming out of that even when she has a myocardial infarction, I have told her repeatedly that things has been going well, problem was fixed, she is on appropriate therapeutics, we reviewed FibroScan it shows a F0 to F1 fibrosis with a 50% hepatocytes started with the fat scores were appropriate.  We reviewed low-dose CT scan of the lung, there is a calcification of mitral valve which was seen on echocardiography in the past nothing to worry about, there is no significant regurgitation or any valvular abnormality.  Mammograph is elevated, triglycerides remains more than 200, LDL is controlled.  She is a former smoker now, told her that she needs to enjoy her life and told her that she needs to get out of that anxiety and stress of myocardial infarction, still continue to manifest class II obesity with BMI of 36, she will be defined as a metabolic dysfunction associated steatotic liver disease with FibroScan done intermittently, with readings are stable, potassium level is normalized, rest of therapy including Brilinta to be continued, follow-up in 3 months with labs as she has been requesting every time before the next visit because she is afraid that something will show up.  Patient's care has been provided by me on a longitudinal basis with the management of the chronic problems with if any acute problem arise in the interim here at our facility on ongoing basis.       [1]   Past Medical History:  Diagnosis Date    Depression     Hyperlipidemia    [2]   Social History  Tobacco Use    Smoking status: Former     Current packs/day: 0.00     Average packs/day: 1 pack/day for 15.0 years (15.0 ttl pk-yrs)     Types: Cigarettes     Start date: 2008     Quit date: 2023     Years since quittin.6    Smokeless tobacco: Never   Vaping Use    Vaping status: Never Used   Substance Use Topics    Alcohol use: Never    Drug use: Never   [3]   Family History  Problem  Relation Name Age of Onset    Heart attack Father August 65    Hypertension Father August     Heart disease Paternal Grandmother Allie 65   [4] No Known Allergies

## 2025-06-02 ENCOUNTER — APPOINTMENT (OUTPATIENT)
Dept: CARDIOLOGY | Facility: CLINIC | Age: 65
End: 2025-06-02
Payer: MEDICARE

## 2025-06-12 DIAGNOSIS — E87.6 HYPOKALEMIA: ICD-10-CM

## 2025-06-12 RX ORDER — POTASSIUM CHLORIDE 750 MG/1
10 TABLET, EXTENDED RELEASE ORAL DAILY
Qty: 90 TABLET | Refills: 3 | Status: SHIPPED | OUTPATIENT
Start: 2025-06-12

## 2025-08-15 LAB
25(OH)D3+25(OH)D2 SERPL-MCNC: 37 NG/ML (ref 30–100)
ALBUMIN SERPL-MCNC: 4.3 G/DL (ref 3.6–5.1)
ALP SERPL-CCNC: 51 U/L (ref 37–153)
ALT SERPL-CCNC: 17 U/L (ref 6–29)
ANION GAP SERPL CALCULATED.4IONS-SCNC: 12 MMOL/L (CALC) (ref 7–17)
AST SERPL-CCNC: 18 U/L (ref 10–35)
BASOPHILS # BLD AUTO: 58 CELLS/UL (ref 0–200)
BASOPHILS NFR BLD AUTO: 0.8 %
BILIRUB SERPL-MCNC: 0.6 MG/DL (ref 0.2–1.2)
BUN SERPL-MCNC: 20 MG/DL (ref 7–25)
CALCIUM SERPL-MCNC: 9.6 MG/DL (ref 8.6–10.4)
CHLORIDE SERPL-SCNC: 106 MMOL/L (ref 98–110)
CO2 SERPL-SCNC: 23 MMOL/L (ref 20–32)
CREAT SERPL-MCNC: 1.1 MG/DL (ref 0.5–1.05)
EGFRCR SERPLBLD CKD-EPI 2021: 55 ML/MIN/1.73M2
EOSINOPHIL # BLD AUTO: 270 CELLS/UL (ref 15–500)
EOSINOPHIL NFR BLD AUTO: 3.7 %
ERYTHROCYTE [DISTWIDTH] IN BLOOD BY AUTOMATED COUNT: 15.5 % (ref 11–15)
GLUCOSE SERPL-MCNC: 123 MG/DL (ref 65–99)
HCT VFR BLD AUTO: 37.7 % (ref 35–45)
HGB BLD-MCNC: 11.4 G/DL (ref 11.7–15.5)
LYMPHOCYTES # BLD AUTO: 1088 CELLS/UL (ref 850–3900)
LYMPHOCYTES NFR BLD AUTO: 14.9 %
MCH RBC QN AUTO: 25.6 PG (ref 27–33)
MCHC RBC AUTO-ENTMCNC: 30.2 G/DL (ref 32–36)
MCV RBC AUTO: 84.7 FL (ref 80–100)
MONOCYTES # BLD AUTO: 329 CELLS/UL (ref 200–950)
MONOCYTES NFR BLD AUTO: 4.5 %
NEUTROPHILS # BLD AUTO: 5555 CELLS/UL (ref 1500–7800)
NEUTROPHILS NFR BLD AUTO: 76.1 %
PLATELET # BLD AUTO: 276 THOUSAND/UL (ref 140–400)
PMV BLD REES-ECKER: 9.9 FL (ref 7.5–12.5)
POTASSIUM SERPL-SCNC: 4.2 MMOL/L (ref 3.5–5.3)
PROT SERPL-MCNC: 7.6 G/DL (ref 6.1–8.1)
RBC # BLD AUTO: 4.45 MILLION/UL (ref 3.8–5.1)
SODIUM SERPL-SCNC: 141 MMOL/L (ref 135–146)
TSH SERPL-ACNC: 2.08 MIU/L (ref 0.4–4.5)
WBC # BLD AUTO: 7.3 THOUSAND/UL (ref 3.8–10.8)

## 2025-08-21 ENCOUNTER — APPOINTMENT (OUTPATIENT)
Dept: PRIMARY CARE | Facility: CLINIC | Age: 65
End: 2025-08-21
Payer: MEDICARE

## 2025-08-21 VITALS
TEMPERATURE: 96.4 F | HEART RATE: 67 BPM | SYSTOLIC BLOOD PRESSURE: 134 MMHG | BODY MASS INDEX: 36.08 KG/M2 | WEIGHT: 179 LBS | HEIGHT: 59 IN | DIASTOLIC BLOOD PRESSURE: 79 MMHG

## 2025-08-21 DIAGNOSIS — E66.812 CLASS 2 SEVERE OBESITY DUE TO EXCESS CALORIES WITH SERIOUS COMORBIDITY AND BODY MASS INDEX (BMI) OF 36.0 TO 36.9 IN ADULT: ICD-10-CM

## 2025-08-21 DIAGNOSIS — G47.30 SLEEP-DISORDERED BREATHING: ICD-10-CM

## 2025-08-21 DIAGNOSIS — I10 PRIMARY HYPERTENSION: ICD-10-CM

## 2025-08-21 DIAGNOSIS — E66.01 CLASS 2 SEVERE OBESITY DUE TO EXCESS CALORIES WITH SERIOUS COMORBIDITY AND BODY MASS INDEX (BMI) OF 36.0 TO 36.9 IN ADULT: ICD-10-CM

## 2025-08-21 DIAGNOSIS — K76.0 METABOLIC DYSFUNCTION-ASSOCIATED STEATOTIC LIVER DISEASE (MASLD): Primary | ICD-10-CM

## 2025-08-21 DIAGNOSIS — R53.83 OTHER FATIGUE: ICD-10-CM

## 2025-08-21 DIAGNOSIS — I25.110 CORONARY ARTERY DISEASE INVOLVING NATIVE CORONARY ARTERY OF NATIVE HEART WITH UNSTABLE ANGINA PECTORIS: ICD-10-CM

## 2025-08-21 DIAGNOSIS — F41.1 GAD (GENERALIZED ANXIETY DISORDER): ICD-10-CM

## 2025-08-21 PROCEDURE — 3078F DIAST BP <80 MM HG: CPT | Performed by: INTERNAL MEDICINE

## 2025-08-21 PROCEDURE — 3075F SYST BP GE 130 - 139MM HG: CPT | Performed by: INTERNAL MEDICINE

## 2025-08-21 PROCEDURE — 1159F MED LIST DOCD IN RCRD: CPT | Performed by: INTERNAL MEDICINE

## 2025-08-21 PROCEDURE — 99214 OFFICE O/P EST MOD 30 MIN: CPT | Performed by: INTERNAL MEDICINE

## 2025-08-21 PROCEDURE — 3008F BODY MASS INDEX DOCD: CPT | Performed by: INTERNAL MEDICINE

## 2025-08-21 RX ORDER — FLUOXETINE 20 MG/1
20 CAPSULE ORAL DAILY
Qty: 30 CAPSULE | Refills: 11 | Status: SHIPPED | OUTPATIENT
Start: 2025-08-21 | End: 2025-10-20

## 2025-08-21 ASSESSMENT — PATIENT HEALTH QUESTIONNAIRE - PHQ9
6. FEELING BAD ABOUT YOURSELF - OR THAT YOU ARE A FAILURE OR HAVE LET YOURSELF OR YOUR FAMILY DOWN: SEVERAL DAYS
SUM OF ALL RESPONSES TO PHQ QUESTIONS 1-9: 13
5. POOR APPETITE OR OVEREATING: SEVERAL DAYS
3. TROUBLE FALLING OR STAYING ASLEEP OR SLEEPING TOO MUCH: NEARLY EVERY DAY
4. FEELING TIRED OR HAVING LITTLE ENERGY: NEARLY EVERY DAY
7. TROUBLE CONCENTRATING ON THINGS, SUCH AS READING THE NEWSPAPER OR WATCHING TELEVISION: SEVERAL DAYS
8. MOVING OR SPEAKING SO SLOWLY THAT OTHER PEOPLE COULD HAVE NOTICED. OR THE OPPOSITE, BEING SO FIGETY OR RESTLESS THAT YOU HAVE BEEN MOVING AROUND A LOT MORE THAN USUAL: NOT AT ALL
SUM OF ALL RESPONSES TO PHQ9 QUESTIONS 1 AND 2: 4
9. THOUGHTS THAT YOU WOULD BE BETTER OFF DEAD, OR OF HURTING YOURSELF: NOT AT ALL
1. LITTLE INTEREST OR PLEASURE IN DOING THINGS: SEVERAL DAYS
2. FEELING DOWN, DEPRESSED OR HOPELESS: NEARLY EVERY DAY

## 2025-08-21 ASSESSMENT — ENCOUNTER SYMPTOMS
ARTHRALGIAS: 1
WEAKNESS: 1
ABDOMINAL PAIN: 0
DIAPHORESIS: 0
FATIGUE: 1
VOMITING: 0
FEVER: 0
CARDIOVASCULAR NEGATIVE: 1
NUMBNESS: 0
COUGH: 0
OCCASIONAL FEELINGS OF UNSTEADINESS: 0
RESPIRATORY NEGATIVE: 1
DEPRESSION: 1
LOSS OF SENSATION IN FEET: 0

## 2025-08-25 ENCOUNTER — HOSPITAL ENCOUNTER (OUTPATIENT)
Dept: RADIOLOGY | Facility: HOSPITAL | Age: 65
Discharge: HOME | End: 2025-08-25
Payer: MEDICARE

## 2025-08-25 DIAGNOSIS — K76.0 METABOLIC DYSFUNCTION-ASSOCIATED STEATOTIC LIVER DISEASE (MASLD): ICD-10-CM

## 2025-08-25 PROCEDURE — 76705 ECHO EXAM OF ABDOMEN: CPT | Performed by: RADIOLOGY

## 2025-08-25 PROCEDURE — 76705 ECHO EXAM OF ABDOMEN: CPT

## 2025-08-28 ENCOUNTER — APPOINTMENT (OUTPATIENT)
Dept: RADIOLOGY | Facility: HOSPITAL | Age: 65
End: 2025-08-28
Payer: MEDICARE

## 2025-08-29 DIAGNOSIS — I25.110 CORONARY ARTERY DISEASE INVOLVING NATIVE CORONARY ARTERY OF NATIVE HEART WITH UNSTABLE ANGINA PECTORIS: ICD-10-CM

## 2025-08-29 RX ORDER — ISOSORBIDE MONONITRATE 30 MG/1
30 TABLET, EXTENDED RELEASE ORAL DAILY
Qty: 90 TABLET | Refills: 3 | Status: SHIPPED | OUTPATIENT
Start: 2025-08-29 | End: 2026-08-29

## 2025-12-03 ENCOUNTER — APPOINTMENT (OUTPATIENT)
Dept: PRIMARY CARE | Facility: CLINIC | Age: 65
End: 2025-12-03
Payer: MEDICARE

## (undated) DEVICE — Device

## (undated) DEVICE — WIRE, NITINOL,.018 X 40CM, PLATINUM COIL

## (undated) DEVICE — CATHETER, BALLOON, NC EUPHORA NONCOMPLIANT 3.0 X 12 X 142CM

## (undated) DEVICE — HEMOSTASIS VALVE KIT

## (undated) DEVICE — CATHETER, DIAGNOSTIC, JUDKINS, RIGHT, 5 FR-JR 4.0

## (undated) DEVICE — CLOSURE SYSTEM, VASCULAR, VASCADE 6/7F VCS

## (undated) DEVICE — CATHETER, BALLOON DILATION, EUPHORA SEMICOMPLIANT 2.50  X 12 MM X 142CM

## (undated) DEVICE — INFLATION DEVICE, 25CC/ 40ATM, W/ 3-WAY STOPCOCK

## (undated) DEVICE — BAND, VASCULAR, RADIAL HEMOSTAT, REGULAR 24CM

## (undated) DEVICE — CATHETER, INFINITI DIAGNOSTIC, 5 FR 100CM 3DRC, WILLIAMS RIGHT OR NO TORQUE

## (undated) DEVICE — CATHETER, BALLOON, NC EUPHORA NONCOMPLIANT 3.5 X 12 X 142CM

## (undated) DEVICE — CATHETER, BALLOON, NC EUPHORA NONCOMPLIANT 2.75 X 12 X 142CM

## (undated) DEVICE — TUBING, MANIFOLD, LOW PRESSURE

## (undated) DEVICE — CATHETER, VISTA BRIGHT TIP 6FR 100CM, JR 4

## (undated) DEVICE — CATHETER, EAGLE EYE, PLATNIUM (IVUS)

## (undated) DEVICE — SHEATH, GLIDESHEATH, SLENDER, 6FR 10CM

## (undated) DEVICE — MBRACE, WRIST SUPPORT WITH HOOK

## (undated) DEVICE — GUIDEWIRE, RUN THROUGH WIRE, 180CM

## (undated) DEVICE — EVEREST 30 INFLATION DEVICE 8 F PACKAGED WITH 1 THREE-WAY STOPCOCK

## (undated) DEVICE — SHEATH, GLIDESHEATH, SLENDER, 6FR 16CM

## (undated) DEVICE — CATHETER, GUIDING, VISTA BRITE 6FR, XB 3.5 100CM

## (undated) DEVICE — CATHETER, DIAGNOSTIC, JUDKINS, LEFT, 5 FR-JL 4.0

## (undated) DEVICE — CATHETER, TELESCOPE, GUDE EXTENSION, 6 FR

## (undated) DEVICE — DEVICE, TORQUE, 0.009 - 0.018IN

## (undated) DEVICE — ACCESS KIT, S-MAK MINI, 5FR 10CM 0.018IN 40CM, SS/SS, ECHO ENHANCE NEEDLE

## (undated) DEVICE — CATHETER, BALLOON, NC EUPHORA NONCOMPLIANT 2.5 X 12 X 142CM

## (undated) DEVICE — BAND, VASCULAR, RADIAL HEMOSTAT, EXTRA-LONG 29CM

## (undated) DEVICE — CATHETER, GUIDELINER, 5.5FR V2

## (undated) DEVICE — CATHETER, BALLOON, NC EUPHORA NONCOMPLIANT 3.5 X 8 X 142CM

## (undated) DEVICE — SHEATH, PINNACLE, W/.038 GW 10CM, 5FR INTRODUCER, 2.5 CM DIALATOR